# Patient Record
Sex: MALE | Race: WHITE | NOT HISPANIC OR LATINO | Employment: OTHER | URBAN - METROPOLITAN AREA
[De-identification: names, ages, dates, MRNs, and addresses within clinical notes are randomized per-mention and may not be internally consistent; named-entity substitution may affect disease eponyms.]

---

## 2017-01-10 ENCOUNTER — OFFICE VISIT (OUTPATIENT)
Dept: CARDIAC SURGERY | Facility: CLINIC | Age: 72
End: 2017-01-10

## 2017-01-10 ENCOUNTER — TRANSCRIBE ORDERS (OUTPATIENT)
Dept: PERIOP | Facility: HOSPITAL | Age: 72
End: 2017-01-10

## 2017-01-10 VITALS
RESPIRATION RATE: 20 BRPM | DIASTOLIC BLOOD PRESSURE: 79 MMHG | TEMPERATURE: 98.6 F | SYSTOLIC BLOOD PRESSURE: 169 MMHG | BODY MASS INDEX: 32.2 KG/M2 | OXYGEN SATURATION: 96 % | HEIGHT: 71 IN | HEART RATE: 72 BPM | WEIGHT: 230 LBS

## 2017-01-10 DIAGNOSIS — J44.9 CHRONIC OBSTRUCTIVE BRONCHITIS (HCC): ICD-10-CM

## 2017-01-10 DIAGNOSIS — I25.10 CORONARY ARTERY DISEASE INVOLVING NATIVE CORONARY ARTERY OF NATIVE HEART, ANGINA PRESENCE UNSPECIFIED: Primary | ICD-10-CM

## 2017-01-10 DIAGNOSIS — I20.8 ANGINA OF EFFORT (HCC): ICD-10-CM

## 2017-01-10 DIAGNOSIS — I25.10 CHRONIC CORONARY ARTERY DISEASE: Primary | ICD-10-CM

## 2017-01-10 DIAGNOSIS — I71.40 ABDOMINAL AORTIC ANEURYSM (AAA) WITHOUT RUPTURE (HCC): ICD-10-CM

## 2017-01-10 DIAGNOSIS — F17.200 CURRENT SMOKER: ICD-10-CM

## 2017-01-10 PROCEDURE — 99204 OFFICE O/P NEW MOD 45 MIN: CPT | Performed by: THORACIC SURGERY (CARDIOTHORACIC VASCULAR SURGERY)

## 2017-01-10 RX ORDER — CLOPIDOGREL BISULFATE 75 MG/1
75 TABLET ORAL
COMMUNITY
Start: 2013-04-10 | End: 2017-01-25 | Stop reason: HOSPADM

## 2017-01-10 RX ORDER — NITROGLYCERIN 0.4 MG/1
0.4 TABLET SUBLINGUAL
COMMUNITY
End: 2017-01-10 | Stop reason: ALTCHOICE

## 2017-01-10 RX ORDER — ASPIRIN 325 MG
325 TABLET ORAL DAILY
COMMUNITY
End: 2017-01-25 | Stop reason: HOSPADM

## 2017-01-10 RX ORDER — ESOMEPRAZOLE MAGNESIUM 40 MG/1
40 CAPSULE, DELAYED RELEASE ORAL
COMMUNITY
End: 2017-01-10 | Stop reason: ALTCHOICE

## 2017-01-10 NOTE — MR AVS SNAPSHOT
[facility] CARDIAC SURGERY  [phone]                    Conor Faustin   1/10/2017 11:00 AM   Office Visit    Dept Phone:  [phone]   Encounter #:  [id number]    Provider:  Jassi De Jesus MD   Department:  [facility] CARDIAC SURGERY                Your Full Care Plan              Today's Medication Changes          These changes are accurate as of: 1/10/17 12:35 PM.  If you have any questions, ask your nurse or doctor.               Medication(s)that have changed:     aspirin 325 MG tablet   Take 325 mg by mouth.   What changed:  Another medication with the same name was removed. Continue taking this medication, and follow the directions you see here.   Changed by:  Jassi De Jesus MD       clopidogrel 75 MG tablet   Commonly known as:  PLAVIX   Take 75 mg by mouth.   What changed:  Another medication with the same name was removed. Continue taking this medication, and follow the directions you see here.   Changed by:  Jassi De Jesus MD       nitroglycerin 0.4 MG SL tablet   Commonly known as:  NITROSTAT   Place 0.4 mg under the tongue Every 5 (Five) Minutes As Needed for chest pain. Take no more than 3 doses in 15 minutes.   What changed:  Another medication with the same name was removed. Continue taking this medication, and follow the directions you see here.         Stop taking medication(s)listed here:     esomeprazole 40 MG capsule   Commonly known as:  nexIUM   Stopped by:  Jassi De Jesus MD           DINESH MULTI MEN PO   Stopped by:  Jassi De Jesus MD           Milk Thistle 200 MG capsule   Stopped by:  Jassi De Jesus MD                      Your Updated Medication List          This list is accurate as of: 1/10/17 12:35 PM.  Always use your most recent med list.                aspirin 325 MG tablet       clopidogrel 75 MG tablet   Commonly known as:  PLAVIX       CoQ10 100 MG capsule       metoprolol tartrate 25 MG tablet   Commonly known as:   "LOPRESSOR       MULTIPLE VITAMINS ESSENTIAL PO       nitroglycerin 0.4 MG SL tablet   Commonly known as:  NITROSTAT               Instructions     None    Patient Instructions History      Upcoming Appointments     Visit Type Date Time Department    NEW PATIENT 1/10/2017 11:00 AM [facility] CARDIAC SURG [facility]      happnhart Signup     Our records indicate that you have [facility]. If you would like to sign up for [employer], please email [email or web address] or call [phone] to obtain an activation code.             Other Info from Your Visit           Allergies     Bisoprolol  Angioedema    Clarithromycin  Swelling      Vital Signs     Blood Pressure Pulse Temperature Respirations Height Weight    169/79 (BP Location: Right arm, Patient Position: Sitting, Cuff Size: Adult) 72 98.6 °F (37 °C) (Oral) 20 71\" (180.3 cm) 230 lb (104 kg)    Oxygen Saturation Body Mass Index Smoking Status             96% 32.08 kg/m2 Current Every Day Smoker         Problems and Diagnoses Noted     Anemia due to acute blood loss    Gallstones    Heart disease due to blocked artery    Chronic obstructive bronchitis    Smoker    High blood pressure        "

## 2017-01-10 NOTE — LETTER
January 14, 2017     Irena Hale MD  [address] [facility] [address]    Patient: Conor Faustin   YOB: 1945   Date of Visit: 1/10/2017       Dear Dr. Geoffrey MD:    Thank you for referring Conor Faustin to me for evaluation. Below are the relevant portions of my assessment and plan of care.    If you have questions, please do not hesitate to call me. I look forward to following Conor along with you.         Sincerely,        Jassi De Jesus MD        CC: MD Jassi Escobedo MD  1/14/2017  7:12 AM  Sign at close encounter  1/14/2017      Chief Complaint     Chest pain    History of Present Illness:       Dear Irena Seth, * and Colleagues,  It was nice to see Conor Faustin in consultation at your request. He is a 71 y.o. male with a history of HTN, AAA and COPD who has developed progressive angina of exertion, moderate, substernal, on minimal ambulation and improves with SL NTG. No diaphoresis or nausea. He denies syncope, presyncope, TIA, orthopnea, palpitations or LE edema. He had a positive stress test and a cath following showed a 100% LAD occlusion, D1 50%, OM 90 % and mid RCA 90%.  Normal LV gram.  A 2 D echo showed and EF or 64 % and valve disease. He still smokes 1 pack per day and has chronic bronchitis and suspect COPD. PFTs were done today and showed a FEV1 of 40 % to 47% with bronchodilators.    Patient Active Problem List   Diagnosis   • Aneurysm of aortic arch   • Essential hypertension   • Renal cyst   • Acute posthemorrhagic anemia   • Chronic coronary artery disease   • Abnormal result of cardiovascular function study   • Cholelithiasis   • Chronic obstructive bronchitis   • Current smoker   • AAA (abdominal aortic aneurysm)   • Angina of effort       Past Medical History   Diagnosis Date   • AAA (abdominal aortic aneurysm)    • Benign essential hypertension    • COPD (chronic obstructive pulmonary disease)    • Coronary  artery disease    • Renal cyst      RIGHT       Past Surgical History   Procedure Laterality Date   • Cardiac catheterization     • Peripheral arterial stent graft     • Coronary angioplasty     • Cardiac catheterization N/A 12/12/2016     Procedure: Left Heart Cath;  Surgeon: Irena Hale MD;  [facility] CATH INVASIVE LOC[facility];  Service:    • Cardiac catheterization         Allergies   Allergen Reactions   • Bisoprolol Angioedema   • Clarithromycin Swelling         Current Outpatient Prescriptions:   •  aspirin 325 MG tablet, Take 325 mg by mouth Daily. PREOP INSTR GIVEN, Disp: , Rfl:   •  clopidogrel (PLAVIX) 75 MG tablet, Take 75 mg by mouth. HOLD PER MD INSTR- STOPPED 1/10/2017, Disp: , Rfl:   •  Coenzyme Q10 (COQ10) 100 MG capsule, Take 400 mg by mouth Daily., Disp: , Rfl:   •  metoprolol tartrate (LOPRESSOR) 25 MG tablet, Take 25 mg by mouth Daily., Disp: , Rfl:   •  MULTIPLE VITAMINS ESSENTIAL PO, Take  by mouth., Disp: , Rfl:   •  nitroglycerin (NITROSTAT) 0.4 MG SL tablet, Place 0.4 mg under the tongue Every 5 (Five) Minutes As Needed for chest pain. Take no more than 3 doses in 15 minutes., Disp: , Rfl:   •  chlorhexidine (PERIDEX) 0.12 % solution, Apply 15 mL to the mouth or throat 2 (Two) Times a Day. PREOP INSTR GIVEN, Disp: , Rfl:   •  Chlorhexidine Gluconate Cloth 2 % pads, Apply  topically. PREOP INSTR GIVEN, Disp: , Rfl:   •  mupirocin (BACTROBAN) 2 % nasal ointment, into each nostril 2 (Two) Times a Day. PREOP INSTR GIVEN, Disp: , Rfl:   No current facility-administered medications for this visit.     Social History     Social History   • Marital status:      Spouse name: N/A   • Number of children: N/A   • Years of education: N/A     Occupational History   • Not on file.     Social History Main Topics   • Smoking status: Current Every Day Smoker     Packs/day: 1.00     Years: 64.00   • Smokeless tobacco: Never Used   • Alcohol use No   • Drug use: No   • Sexual activity:  Defer     Other Topics Concern   • Not on file     Social History Narrative       Family History   Problem Relation Age of Onset   • No Known Problems Mother    • No Known Problems Father    • No Known Problems Sister    • No Known Problems Brother    • No Known Problems Maternal Aunt    • No Known Problems Maternal Uncle    • No Known Problems Paternal Aunt    • No Known Problems Paternal Uncle    • No Known Problems Maternal Grandmother    • No Known Problems Maternal Grandfather    • Heart disease Paternal Grandmother    • Stroke Paternal Grandmother    • No Known Problems Paternal Grandfather      Review of Systems   Constitutional: Positive for fatigue.   Respiratory: Positive for chest tightness, shortness of breath and wheezing.    Cardiovascular: Positive for chest pain. Negative for palpitations and leg swelling.   Gastrointestinal: Negative for blood in stool.   Neurological: Positive for light-headedness. Negative for weakness and numbness.   All other systems reviewed and are negative.      Physical Exam:    Vital Signs:  Weight: 230 lb (104 kg)   Body mass index is 32.08 kg/(m^2).  Temp: 98.6 °F (37 °C)   Heart Rate: 72   BP: 169/79     Physical Exam   Constitutional: He is oriented to person, place, and time. He appears well-developed and well-nourished.   HENT:   Head: Normocephalic and atraumatic.   Nose: Nose normal.   Mouth/Throat: Oropharynx is clear and moist.   Eyes: Conjunctivae are normal. Pupils are equal, round, and reactive to light.   Neck: Normal range of motion. Neck supple. No JVD present. No thyromegaly present.   Cardiovascular: Normal rate, regular rhythm, normal heart sounds and intact distal pulses.  PMI is not displaced.  Exam reveals no gallop and no friction rub.    No murmur heard.  Pulses:       Radial pulses are 2+ on the right side, and 2+ on the left side.        Popliteal pulses are 2+ on the right side, and 2+ on the left side.        Posterior tibial pulses are 2+ on the  right side, and 2+ on the left side.   Pulmonary/Chest: Effort normal and breath sounds normal. He has no rales.   Abdominal: Soft. Bowel sounds are normal. He exhibits no distension and no mass. There is no tenderness.   Musculoskeletal: Normal range of motion. He exhibits no tenderness or deformity.   Lymphadenopathy:     He has no cervical adenopathy.   Neurological: He is alert and oriented to person, place, and time. He has normal reflexes.   Skin: Skin is warm and dry. No rash noted. No erythema.   Psychiatric: He has a normal mood and affect. His behavior is normal.   No groin or neck adenomegalies     Assessment:     Problem List Items Addressed This Visit        Cardiovascular and Mediastinum    Chronic coronary artery disease - Primary    Relevant Medications    clopidogrel (PLAVIX) 75 MG tablet    AAA (abdominal aortic aneurysm)    Angina of effort    Relevant Medications    clopidogrel (PLAVIX) 75 MG tablet       Respiratory    Chronic obstructive bronchitis       Other    Current smoker        As above, CAD, 3 vessel with angina of minimal effort, class III-IV.  Dyspnea associated with angina and probably COPD  Tobacco use  Small AAA, asymptomatic    Recommendation/Plan:     I recommend CABG x 3 with LIMA. I have discussed the risks (STS calculated), benefits and alternatives of surgery and he wishes to proceed. I have  him to stop smoking. He will probably need to be seen by a pulmonologist in the hospital as well.      Thank you for allowing me to participate in his care.    Regards,    Jassi De Jesus M.D.

## 2017-01-11 ENCOUNTER — TELEPHONE (OUTPATIENT)
Dept: CARDIAC SURGERY | Facility: CLINIC | Age: 72
End: 2017-01-11

## 2017-01-11 DIAGNOSIS — R09.89 BILATERAL CAROTID BRUITS: ICD-10-CM

## 2017-01-11 DIAGNOSIS — I25.10 ASHD (ARTERIOSCLEROTIC HEART DISEASE): Primary | ICD-10-CM

## 2017-01-12 ENCOUNTER — HOSPITAL ENCOUNTER (OUTPATIENT)
Dept: CARDIOLOGY | Facility: HOSPITAL | Age: 72
Discharge: HOME | End: 2017-01-12
Attending: THORACIC SURGERY (CARDIOTHORACIC VASCULAR SURGERY)

## 2017-01-12 ENCOUNTER — HOSPITAL ENCOUNTER (OUTPATIENT)
Dept: CARDIOLOGY | Facility: HOSPITAL | Age: 72
Discharge: HOME | End: 2017-01-12
Attending: THORACIC SURGERY (CARDIOTHORACIC VASCULAR SURGERY) | Admitting: THORACIC SURGERY (CARDIOTHORACIC VASCULAR SURGERY)

## 2017-01-12 ENCOUNTER — HOSPITAL ENCOUNTER (OUTPATIENT)
Dept: RESPIRATORY THERAPY | Facility: HOSPITAL | Age: 72
Discharge: HOME | End: 2017-01-12
Attending: THORACIC SURGERY (CARDIOTHORACIC VASCULAR SURGERY)

## 2017-01-12 ENCOUNTER — HOSPITAL ENCOUNTER (OUTPATIENT)
Dept: GENERAL RADIOLOGY | Facility: HOSPITAL | Age: 72
Discharge: HOME | End: 2017-01-12

## 2017-01-12 ENCOUNTER — DOCUMENTATION (OUTPATIENT)
Dept: CARDIAC SURGERY | Facility: CLINIC | Age: 72
End: 2017-01-12

## 2017-01-12 ENCOUNTER — ANESTHESIA EVENT (OUTPATIENT)
Dept: PERIOP | Facility: HOSPITAL | Age: 72
End: 2017-01-12

## 2017-01-12 ENCOUNTER — APPOINTMENT (OUTPATIENT)
Dept: PREADMISSION TESTING | Facility: HOSPITAL | Age: 72
End: 2017-01-12

## 2017-01-12 VITALS
OXYGEN SATURATION: 97 % | DIASTOLIC BLOOD PRESSURE: 78 MMHG | TEMPERATURE: 98 F | HEIGHT: 72 IN | BODY MASS INDEX: 31.04 KG/M2 | HEART RATE: 69 BPM | WEIGHT: 229.2 LBS | RESPIRATION RATE: 18 BRPM | SYSTOLIC BLOOD PRESSURE: 152 MMHG

## 2017-01-12 DIAGNOSIS — R09.89 BILATERAL CAROTID BRUITS: ICD-10-CM

## 2017-01-12 DIAGNOSIS — I25.10 ASHD (ARTERIOSCLEROTIC HEART DISEASE): ICD-10-CM

## 2017-01-12 DIAGNOSIS — Z01.818 PRE-OPERATIVE CLEARANCE: Primary | ICD-10-CM

## 2017-01-12 LAB
ABO GROUP BLD: NORMAL
ALBUMIN SERPL-MCNC: 4 G/DL (ref 3.5–5.2)
ALBUMIN/GLOB SERPL: 1.3 G/DL
ALP SERPL-CCNC: 92 U/L (ref 39–117)
ALT SERPL W P-5'-P-CCNC: 17 U/L (ref 1–41)
ANION GAP SERPL CALCULATED.3IONS-SCNC: 10.2 MMOL/L
APTT PPP: 27.7 SECONDS (ref 22.7–35.4)
ARTERIAL PATENCY WRIST A: POSITIVE
AST SERPL-CCNC: 18 U/L (ref 1–40)
ATMOSPHERIC PRESS: 751.9 MMHG
BASE EXCESS BLDA CALC-SCNC: 1.2 MMOL/L (ref 0–2)
BDY SITE: ABNORMAL
BH CV XLRA MEAS - DIST GSV CALF DIST LEFT: 0.3 CM
BH CV XLRA MEAS - DIST GSV CALF DIST RIGHT: 0.36 CM
BH CV XLRA MEAS - DIST GSV THIGH DIST LEFT: 0.37 CM
BH CV XLRA MEAS - DIST GSV THIGH DIST RIGHT: 0.4 CM
BH CV XLRA MEAS - GSV ANKLE DIST LEFT: 0.27 CM
BH CV XLRA MEAS - GSV ANKLE DIST RIGHT: 0.33 CM
BH CV XLRA MEAS - GSV KNEE DIST LEFT: 0.36 CM
BH CV XLRA MEAS - GSV KNEE DIST RIGHT: 0.45 CM
BH CV XLRA MEAS - GSV ORIGIN DIST LEFT: 0.55 CM
BH CV XLRA MEAS - GSV ORIGIN DIST RIGHT: 0.46 CM
BH CV XLRA MEAS - MID GSV CALF LEFT: 0.28 CM
BH CV XLRA MEAS - MID GSV CALF RIGHT: 0.3 CM
BH CV XLRA MEAS - MID GSV THIGH  LEFT: 0.32 CM
BH CV XLRA MEAS - MID GSV THIGH  RIGHT: 0.36 CM
BH CV XLRA MEAS - PROX GSV CALF DIST LEFT: 0.26 CM
BH CV XLRA MEAS - PROX GSV CALF DIST RIGHT: 0.36 CM
BH CV XLRA MEAS - PROX GSV THIGH  LEFT: 0.37 CM
BH CV XLRA MEAS - PROX GSV THIGH  RIGHT: 0.47 CM
BH CV XLRA MEAS LEFT CCA RATIO VEL: 72.1 CM/SEC
BH CV XLRA MEAS LEFT DIST CCA EDV: 16.4 CM/SEC
BH CV XLRA MEAS LEFT DIST CCA PSV: 72.1 CM/SEC
BH CV XLRA MEAS LEFT DIST ICA EDV: -18.2 CM/SEC
BH CV XLRA MEAS LEFT DIST ICA PSV: -69.2 CM/SEC
BH CV XLRA MEAS LEFT ICA RATIO VEL: -69.2 CM/SEC
BH CV XLRA MEAS LEFT ICA/CCA RATIO: -0.96
BH CV XLRA MEAS LEFT MID ICA EDV: -15.8 CM/SEC
BH CV XLRA MEAS LEFT MID ICA PSV: -61 CM/SEC
BH CV XLRA MEAS LEFT PROX CCA EDV: 14.1 CM/SEC
BH CV XLRA MEAS LEFT PROX CCA PSV: 82.1 CM/SEC
BH CV XLRA MEAS LEFT PROX ECA EDV: -7 CM/SEC
BH CV XLRA MEAS LEFT PROX ECA PSV: -64.8 CM/SEC
BH CV XLRA MEAS LEFT PROX ICA EDV: 13.5 CM/SEC
BH CV XLRA MEAS LEFT PROX ICA PSV: 65.7 CM/SEC
BH CV XLRA MEAS LEFT PROX SCLA PSV: 178 CM/SEC
BH CV XLRA MEAS LEFT VERTEBRAL A EDV: 9.4 CM/SEC
BH CV XLRA MEAS LEFT VERTEBRAL A PSV: 51.1 CM/SEC
BH CV XLRA MEAS RIGHT CCA RATIO VEL: 66.4 CM/SEC
BH CV XLRA MEAS RIGHT DIST CCA EDV: 11 CM/SEC
BH CV XLRA MEAS RIGHT DIST CCA PSV: 66.4 CM/SEC
BH CV XLRA MEAS RIGHT DIST ICA EDV: -24.4 CM/SEC
BH CV XLRA MEAS RIGHT DIST ICA PSV: -73.9 CM/SEC
BH CV XLRA MEAS RIGHT ICA RATIO VEL: -73.9 CM/SEC
BH CV XLRA MEAS RIGHT ICA/CCA RATIO: -1.1
BH CV XLRA MEAS RIGHT MID ICA EDV: -16.1 CM/SEC
BH CV XLRA MEAS RIGHT MID ICA PSV: -57.7 CM/SEC
BH CV XLRA MEAS RIGHT PROX CCA EDV: -11 CM/SEC
BH CV XLRA MEAS RIGHT PROX CCA PSV: -88.8 CM/SEC
BH CV XLRA MEAS RIGHT PROX ECA EDV: -8.7 CM/SEC
BH CV XLRA MEAS RIGHT PROX ECA PSV: -84.2 CM/SEC
BH CV XLRA MEAS RIGHT PROX ICA EDV: -12.2 CM/SEC
BH CV XLRA MEAS RIGHT PROX ICA PSV: -44 CM/SEC
BH CV XLRA MEAS RIGHT PROX SCLA PSV: 194 CM/SEC
BH CV XLRA MEAS RIGHT VERTEBRAL A EDV: 8.6 CM/SEC
BH CV XLRA MEAS RIGHT VERTEBRAL A PSV: 44.8 CM/SEC
BILIRUB SERPL-MCNC: 0.4 MG/DL (ref 0.1–1.2)
BILIRUB UR QL STRIP: NEGATIVE
BLD GP AB SCN SERPL QL: NEGATIVE
BUN BLD-MCNC: 16 MG/DL (ref 8–23)
BUN/CREAT SERPL: 14.4 (ref 7–25)
CALCIUM SPEC-SCNC: 9.3 MG/DL (ref 8.6–10.5)
CHLORIDE SERPL-SCNC: 99 MMOL/L (ref 98–107)
CLARITY UR: CLEAR
CLOSE TME COLL+ADP + EPINEP PNL BLD: 34 %
CO2 SERPL-SCNC: 30.8 MMOL/L (ref 22–29)
COLOR UR: YELLOW
CREAT BLD-MCNC: 1.11 MG/DL (ref 0.76–1.27)
DEPRECATED RDW RBC AUTO: 43.1 FL (ref 37–54)
ERYTHROCYTE [DISTWIDTH] IN BLOOD BY AUTOMATED COUNT: 13.2 % (ref 11.5–14.5)
GFR SERPL CREATININE-BSD FRML MDRD: 65 ML/MIN/1.73
GLOBULIN UR ELPH-MCNC: 3.1 GM/DL
GLUCOSE BLD-MCNC: 155 MG/DL (ref 65–99)
GLUCOSE UR STRIP-MCNC: NEGATIVE MG/DL
HBA1C MFR BLD: 6.8 % (ref 4.8–5.6)
HCO3 BLDA-SCNC: 26.2 MMOL/L (ref 22–28)
HCT VFR BLD AUTO: 48.6 % (ref 40.4–52.2)
HGB BLD-MCNC: 16.7 G/DL (ref 13.7–17.6)
HGB UR QL STRIP.AUTO: NEGATIVE
INR PPP: 0.91 (ref 0.9–1.1)
KETONES UR QL STRIP: NEGATIVE
LEFT ARM BP: NORMAL MMHG
LEUKOCYTE ESTERASE UR QL STRIP.AUTO: NEGATIVE
LYMPHOCYTES # BLD MANUAL: 1.97 10*3/MM3 (ref 0.9–4.8)
LYMPHOCYTES NFR BLD MANUAL: 18 % (ref 19.6–45.3)
LYMPHOCYTES NFR BLD MANUAL: 9 % (ref 5–12)
MCH RBC QN AUTO: 30.8 PG (ref 27–32.7)
MCHC RBC AUTO-ENTMCNC: 34.4 G/DL (ref 32.6–36.4)
MCV RBC AUTO: 89.5 FL (ref 79.8–96.2)
MODALITY: ABNORMAL
MONOCYTES # BLD AUTO: 0.99 10*3/MM3 (ref 0.2–1.2)
NEUTROPHILS # BLD AUTO: 8 10*3/MM3 (ref 1.9–8.1)
NEUTROPHILS NFR BLD MANUAL: 73 % (ref 42.7–76)
NITRITE UR QL STRIP: NEGATIVE
PCO2 BLDA: 42.1 MM HG (ref 35–45)
PH BLDA: 7.4 PH UNITS (ref 7.35–7.45)
PH UR STRIP.AUTO: 5.5 [PH] (ref 5–8)
PLAT MORPH BLD: NORMAL
PLATELET # BLD AUTO: 179 10*3/MM3 (ref 140–500)
PMV BLD AUTO: 10.6 FL (ref 6–12)
PO2 BLDA: 70.6 MM HG (ref 80–100)
POTASSIUM BLD-SCNC: 4.1 MMOL/L (ref 3.5–5.2)
PROT SERPL-MCNC: 7.1 G/DL (ref 6–8.5)
PROT UR QL STRIP: NEGATIVE
PROTHROMBIN TIME: 11.9 SECONDS (ref 11.7–14.2)
RBC # BLD AUTO: 5.43 10*6/MM3 (ref 4.6–6)
RBC MORPH BLD: NORMAL
RH BLD: POSITIVE
RIGHT ARM BP: NORMAL MMHG
SAO2 % BLDCOA: 93.9 % (ref 92–99)
SCAN SLIDE: NORMAL
SET MECH RESP RATE: 16
SODIUM BLD-SCNC: 140 MMOL/L (ref 136–145)
SP GR UR STRIP: 1.01 (ref 1–1.03)
UROBILINOGEN UR QL STRIP: NORMAL
WBC MORPH BLD: NORMAL
WBC NRBC COR # BLD: 10.96 10*3/MM3 (ref 4.5–10.7)

## 2017-01-12 PROCEDURE — A9270 NON-COVERED ITEM OR SERVICE: HCPCS | Performed by: THORACIC SURGERY (CARDIOTHORACIC VASCULAR SURGERY)

## 2017-01-12 PROCEDURE — 85025 COMPLETE CBC W/AUTO DIFF WBC: CPT | Performed by: THORACIC SURGERY (CARDIOTHORACIC VASCULAR SURGERY)

## 2017-01-12 PROCEDURE — 86920 COMPATIBILITY TEST SPIN: CPT

## 2017-01-12 PROCEDURE — 93970 EXTREMITY STUDY: CPT

## 2017-01-12 PROCEDURE — 85610 PROTHROMBIN TIME: CPT | Performed by: THORACIC SURGERY (CARDIOTHORACIC VASCULAR SURGERY)

## 2017-01-12 PROCEDURE — 86900 BLOOD TYPING SEROLOGIC ABO: CPT

## 2017-01-12 PROCEDURE — 93880 EXTRACRANIAL BILAT STUDY: CPT

## 2017-01-12 PROCEDURE — 80053 COMPREHEN METABOLIC PANEL: CPT | Performed by: THORACIC SURGERY (CARDIOTHORACIC VASCULAR SURGERY)

## 2017-01-12 PROCEDURE — 93005 ELECTROCARDIOGRAM TRACING: CPT

## 2017-01-12 PROCEDURE — 94799 UNLISTED PULMONARY SVC/PX: CPT

## 2017-01-12 PROCEDURE — 36415 COLL VENOUS BLD VENIPUNCTURE: CPT

## 2017-01-12 PROCEDURE — 86850 RBC ANTIBODY SCREEN: CPT

## 2017-01-12 PROCEDURE — 85576 BLOOD PLATELET AGGREGATION: CPT | Performed by: THORACIC SURGERY (CARDIOTHORACIC VASCULAR SURGERY)

## 2017-01-12 PROCEDURE — 63710000001 ALBUTEROL PER 1 MG: Performed by: THORACIC SURGERY (CARDIOTHORACIC VASCULAR SURGERY)

## 2017-01-12 PROCEDURE — 94640 AIRWAY INHALATION TREATMENT: CPT

## 2017-01-12 PROCEDURE — 71020 HC CHEST PA AND LATERAL: CPT

## 2017-01-12 PROCEDURE — 36600 WITHDRAWAL OF ARTERIAL BLOOD: CPT | Performed by: INTERNAL MEDICINE

## 2017-01-12 PROCEDURE — 93010 ELECTROCARDIOGRAM REPORT: CPT | Performed by: INTERNAL MEDICINE

## 2017-01-12 PROCEDURE — 82803 BLOOD GASES ANY COMBINATION: CPT | Performed by: INTERNAL MEDICINE

## 2017-01-12 PROCEDURE — 83036 HEMOGLOBIN GLYCOSYLATED A1C: CPT | Performed by: THORACIC SURGERY (CARDIOTHORACIC VASCULAR SURGERY)

## 2017-01-12 PROCEDURE — 81003 URINALYSIS AUTO W/O SCOPE: CPT | Performed by: THORACIC SURGERY (CARDIOTHORACIC VASCULAR SURGERY)

## 2017-01-12 PROCEDURE — 86901 BLOOD TYPING SEROLOGIC RH(D): CPT

## 2017-01-12 PROCEDURE — 85730 THROMBOPLASTIN TIME PARTIAL: CPT | Performed by: THORACIC SURGERY (CARDIOTHORACIC VASCULAR SURGERY)

## 2017-01-12 PROCEDURE — 94060 EVALUATION OF WHEEZING: CPT

## 2017-01-12 PROCEDURE — 85007 BL SMEAR W/DIFF WBC COUNT: CPT | Performed by: THORACIC SURGERY (CARDIOTHORACIC VASCULAR SURGERY)

## 2017-01-12 RX ORDER — CHLORHEXIDINE GLUCONATE 0.12 MG/ML
15 RINSE ORAL EVERY 12 HOURS SCHEDULED
Status: DISPENSED | OUTPATIENT
Start: 2017-01-12 | End: 2017-01-13

## 2017-01-12 RX ORDER — CHLORHEXIDINE GLUCONATE 0.12 MG/ML
15 RINSE ORAL 2 TIMES DAILY
COMMUNITY
End: 2017-01-25 | Stop reason: HOSPADM

## 2017-01-12 RX ORDER — ALBUTEROL SULFATE 2.5 MG/3ML
2.5 SOLUTION RESPIRATORY (INHALATION) ONCE AS NEEDED
Status: COMPLETED | OUTPATIENT
Start: 2017-01-12 | End: 2017-01-12

## 2017-01-12 RX ORDER — CHLORHEXIDINE GLUCONATE 500 MG/1
CLOTH TOPICAL
COMMUNITY
End: 2017-01-25 | Stop reason: HOSPADM

## 2017-01-12 RX ADMIN — ALBUTEROL SULFATE 2.5 MG: 2.5 SOLUTION RESPIRATORY (INHALATION) at 10:14

## 2017-01-12 NOTE — ANESTHESIA PREPROCEDURE EVALUATION
Anesthesia Evaluation     Patient summary reviewed    Airway   Mallampati: I  Neck ROM: full  no difficulty expected  Dental    (+) edentulous    Pulmonary    (+) COPD,     ROS comment: smoker  Cardiovascular   (+) hypertension, CAD,     Rhythm: regular    Neuro/Psych  GI/Hepatic/Renal/Endo      Musculoskeletal     Abdominal    Substance History      OB/GYN          Other                           Anesthesia Plan    ASA 4     general     intravenous induction   Anesthetic plan and risks discussed with patient.  Use of blood products discussed with patient .

## 2017-01-12 NOTE — MR AVS SNAPSHOT
Conor Faustin   1/12/2017 8:15 AM   Appointment    Provider:  MELISSA GUIDRY PAT 1   Department:  [facility] PREADMISSION T   Dept Phone:  [phone]                Your Full Care Plan           To Do List     1/12/2017 12:45 PM     Appointment with CHAO FIGUEROA NIVAS VASC CART 4 at [facility] NON-INV VASC ([phone])   [address] [facility] [address]       1/12/2017 1:30 PM     Appointment with CHAO FIGUEROA NIVAS VASC CART 4 at [facility] NON-INV VASC ([phone])   DUPLEX ABDOMINAL VASCULAR COMPLETE CAR:  Take BP meds with a sip of water.  DUPLEX PORTAL HEPATIC COMPLETE CAR:  NPO 8 hours prior.   [address] [facility] [address]       Around 1/17/2017     Lab:  Blood Gas, Arterial        Around 1/17/2017     Lab:  Scan Slide        1/17/2017 11:55 AM     Appointment with MELISSA GUIDRY OR ECHO CART OR16 at [facility] MAIN OR ([phone])   Do not eat or drink after midnight. You may be sedated for your test and will need someone to drive you home. Bring your insurance cards with you. Bring all medications in their original bottles.   [address] [facility] [address]            Your Updated Medication List          This list is accurate as of: 1/12/17  9:00 AM.  Always use your most recent med list.                aspirin 325 MG tablet       Chlorhexidine Gluconate Cloth 2 % pads       clopidogrel 75 MG tablet   Commonly known as:  PLAVIX       CoQ10 100 MG capsule       metoprolol tartrate 25 MG tablet   Commonly known as:  LOPRESSOR       MULTIPLE VITAMINS ESSENTIAL PO       mupirocin 2 % nasal ointment   Commonly known as:  BACTROBAN       nitroglycerin 0.4 MG SL tablet   Commonly known as:  NITROSTAT       PERIDEX 0.12 % solution   Generic drug:  chlorhexidine               MyChart Signup     Our records indicate that you have declined [facility] MyChart signup. If you would like to sign up for MyChart, please  "email [email or web address] or call [phone] to obtain an activation code.             Other Info from Your Visit           Allergies     Bisoprolol Angioedema    Clarithromycin Swelling      Vital Signs     Blood Pressure Pulse Temperature Respirations Height Weight    152/78 (BP Location: Left arm, Patient Position: Sitting) 69 98 °F (36.7 °C) (Oral) 18 71.5\" (181.6 cm) 229 lb 3.2 oz (104 kg)    Oxygen Saturation Body Mass Index Smoking Status             97% 31.52 kg/m2 Current Every Day Smoker           Discharge Instructions       Take the following medications the morning of surgery with a small sip of water.  NONE    ARRIVE AT 5AM.      General Instructions:  • Do not eat or drink after midnight: includes water, mints, or gum. You may brush your teeth.  • Do not smoke, chew tobacco, or drink alcohol.  • Bring medications in original bottles, any inhalers and if applicable your C-PAP/ BI-PAP machine.  • Bring any papers given to you in the doctor’s office.  • Wear clean comfortable clothes and socks.  • Do not wear contact lenses or make-up.  Bring a case for your glasses if applicable.   • Bring crutches or walker if applicable.  • Leave all other valuables and jewelry at home.    If you were given a blood bank ID arm band remember to bring it with you the day of surgery.    Preventing a Surgical Site Infection:  Shower on the morning of surgery using a fresh bar of anti-bacterial soap (such as Dial) and clean washcloth.  Dry with a clean towel and dress in clean clothing.  For 2 to 3 days before surgery, avoid shaving with a razor near where you will have surgery because the razor can irritate skin and make it easier to develop an infection  Ask your surgeon if you will be receiving antibiotics prior to surgery  Make sure you, your family, and all healthcare providers clean their hands with soap and water or an alcohol based hand  before caring for you or your wound  If at all possible, " quit smoking as many days before surgery as you can.    Day of surgery:  Upon arrival, a Pre-op nurse and Anesthesiologist will review your health history, obtain vital signs, and answer questions you may have.  The only belongings needed at this time will be your home medications and if applicable your C-PAP/BI-PAP machine.  If you are staying overnight your family can leave the rest of your belongings in the car and bring them to your room later.  A Pre-op nurse will start an IV and you may receive medication in preparation for surgery, including something to help you relax.  Your family will be able to see you in the Pre-op area.  While you are in surgery your family should notify the waiting room  if they leave the waiting room area and provide a contact phone number.    Please be aware that surgery does come with discomfort.  We want to make every effort to control your discomfort so please discuss any uncontrolled symptoms with your nurse.   Your doctor will most likely have prescribed pain medications.      If you are going home after surgery you will receive individualized written care instructions before being discharged.  A responsible adult must drive you to and from the hospital on the day of your surgery and stay with you for 24 hours.    If you are staying overnight following surgery, you will be transported to your hospital room following the recovery period.  [facility] has all private rooms.    If you have any questions please call Pre-Admission Testing at [phone].  Deductibles and co-payments are collected on the day of service. Please be prepared to pay the required co-pay, deductible or deposit on the day of service as defined by your plan.    2% CHLORAHEXIDINE GLUCONATE* CLOTH  Preparing or “prepping” skin before surgery can reduce the risk of infection at the surgical site. To make the process easier, [facility] has chosen disposable cloths moistened  with a rinse-free, 2% Chlorhexidine Gluconate (CHG) antiseptic solution. The steps below outline the prepping process and should be carefully followed.        Use the prep cloth on the area that is circled in the diagram             Directions Night before Surgery  1) Shower using a fresh bar of anti-bacterial soap (such as Dial) and clean washcloth.  Use a clean towel to completely dry your skin.  2) Do not use any lotions, oils or creams on your skin.  3) Open the package and remove 1 cloth, wipe your skin for 30 seconds in a circular motion.  Allow to dry for 3 minutes.  4) Repeat #3 with second cloth.  5) Do not touch your eyes, ears, or mouth with the prep cloth.  6) Allow the wet prep solution to air dry.  7) Discard the prep cloth and wash your hands with soap and water.   8) Dress in clean bed clothes and sleep on fresh clean bed sheets.   9) You may experience some temporary itching after the prep.    Directions Day of Surgery  1) Repeat steps 1,2,3,4,5,6,7, and 9.   2) Dress in clean clothes before coming to the hospital.    BACTROBAN NASAL OINTMENT  There are many germs normally in your nose. Bactroban is an ointment that will help reduce these germs. Please follow these instructions for Bactroban use:        ____The day before surgery in the evening              Date________    ____The day of surgery in the morning    Date________    **Squirt ½ package of Bactroban Ointment onto a cotton applicator and apply to inside of 1st nostril.  Squirt the remaining Bactroban and apply to the inside of the other nostril.    PERIDEX- ORAL:  Use only if your surgeon has ordered  Use the night before and morning of surgery - Swish, gargle, and spit - do not swallow.       SYMPTOMS OF A STROKE    Call 911 or have someone take you to the Emergency Department if you have any of the following:    · Sudden numbness or weakness of your face, arm or leg especially on one side of the body  · Sudden confusion, diffiiculty  speaking or trouble understanding   · Changes in your vision or loss of sight in one eye  · Sudden severe headache with no known cause  · sudden dizziness, trouble walking, loss of balance or coordination    It is important to seek emergency care right away if you have further stroke symptoms. If you get emergency help quickly, the powerful clot-dissolving medicines can reduce the disabilities caused by a stroke.     For more information:    American Stroke Association  5-635-1-STROKE  www.strokeassociation.org           IF YOU SMOKE OR USE TOBACCO PLEASE READ THE FOLLOWING:    Why is smoking bad for me?  Smoking increases the risk of heart disease, lung disease, vascular disease, stroke, and cancer.     If you smoke, STOP!    If you would like more information on quitting smoking, please visit the [employer] website: www.LivingSocial/Revenewate/healthier-together/smoke   This link will provide additional resources including the QUIT line and the Beat the Pack support groups.     For more information:    American Cancer Society  (741) 107-9204    American Heart Association  1-232.904.1912

## 2017-01-12 NOTE — DISCHARGE INSTRUCTIONS
Take the following medications the morning of surgery with a small sip of water.  NONE    ARRIVE AT 5AM.      General Instructions:  • Do not eat or drink after midnight: includes water, mints, or gum. You may brush your teeth.  • Do not smoke, chew tobacco, or drink alcohol.  • Bring medications in original bottles, any inhalers and if applicable your C-PAP/ BI-PAP machine.  • Bring any papers given to you in the doctor’s office.  • Wear clean comfortable clothes and socks.  • Do not wear contact lenses or make-up.  Bring a case for your glasses if applicable.   • Bring crutches or walker if applicable.  • Leave all other valuables and jewelry at home.    If you were given a blood bank ID arm band remember to bring it with you the day of surgery.    Preventing a Surgical Site Infection:  Shower on the morning of surgery using a fresh bar of anti-bacterial soap (such as Dial) and clean washcloth.  Dry with a clean towel and dress in clean clothing.  For 2 to 3 days before surgery, avoid shaving with a razor near where you will have surgery because the razor can irritate skin and make it easier to develop an infection  Ask your surgeon if you will be receiving antibiotics prior to surgery  Make sure you, your family, and all healthcare providers clean their hands with soap and water or an alcohol based hand  before caring for you or your wound  If at all possible, quit smoking as many days before surgery as you can.    Day of surgery:  Upon arrival, a Pre-op nurse and Anesthesiologist will review your health history, obtain vital signs, and answer questions you may have.  The only belongings needed at this time will be your home medications and if applicable your C-PAP/BI-PAP machine.  If you are staying overnight your family can leave the rest of your belongings in the car and bring them to your room later.  A Pre-op nurse will start an IV and you may receive medication in preparation for surgery, including  something to help you relax.  Your family will be able to see you in the Pre-op area.  While you are in surgery your family should notify the waiting room  if they leave the waiting room area and provide a contact phone number.    Please be aware that surgery does come with discomfort.  We want to make every effort to control your discomfort so please discuss any uncontrolled symptoms with your nurse.   Your doctor will most likely have prescribed pain medications.      If you are going home after surgery you will receive individualized written care instructions before being discharged.  A responsible adult must drive you to and from the hospital on the day of your surgery and stay with you for 24 hours.    If you are staying overnight following surgery, you will be transported to your hospital room following the recovery period.  [facility] has all private rooms.    If you have any questions please call Pre-Admission Testing at [phone].  Deductibles and co-payments are collected on the day of service. Please be prepared to pay the required co-pay, deductible or deposit on the day of service as defined by your plan.    2% CHLORAHEXIDINE GLUCONATE* CLOTH  Preparing or “prepping” skin before surgery can reduce the risk of infection at the surgical site. To make the process easier, [facility] has chosen disposable cloths moistened with a rinse-free, 2% Chlorhexidine Gluconate (CHG) antiseptic solution. The steps below outline the prepping process and should be carefully followed.        Use the prep cloth on the area that is circled in the diagram             Directions Night before Surgery  1) Shower using a fresh bar of anti-bacterial soap (such as Dial) and clean washcloth.  Use a clean towel to completely dry your skin.  2) Do not use any lotions, oils or creams on your skin.  3) Open the package and remove 1 cloth, wipe your skin for 30 seconds in a circular motion.   Allow to dry for 3 minutes.  4) Repeat #3 with second cloth.  5) Do not touch your eyes, ears, or mouth with the prep cloth.  6) Allow the wet prep solution to air dry.  7) Discard the prep cloth and wash your hands with soap and water.   8) Dress in clean bed clothes and sleep on fresh clean bed sheets.   9) You may experience some temporary itching after the prep.    Directions Day of Surgery  1) Repeat steps 1,2,3,4,5,6,7, and 9.   2) Dress in clean clothes before coming to the hospital.    BACTROBAN NASAL OINTMENT  There are many germs normally in your nose. Bactroban is an ointment that will help reduce these germs. Please follow these instructions for Bactroban use:        ____The day before surgery in the evening              Date________    ____The day of surgery in the morning    Date________    **Squirt ½ package of Bactroban Ointment onto a cotton applicator and apply to inside of 1st nostril.  Squirt the remaining Bactroban and apply to the inside of the other nostril.    PERIDEX- ORAL:  Use only if your surgeon has ordered  Use the night before and morning of surgery - Swish, gargle, and spit - do not swallow.

## 2017-01-12 NOTE — PROGRESS NOTES
Mr. Faustin is scheduled for CABG with Dr. De Jesus on 1/17/17. He was just seen in the office by Dr. De Jesus on 1/10/17. Seen today in Pre-Admission Testing. Surgery discussed with patient and his wife and all questions answered. States his last dose of Plavix was on 1/10/17.

## 2017-01-14 PROBLEM — I20.8 ANGINA OF EFFORT (HCC): Status: ACTIVE | Noted: 2017-01-14

## 2017-01-14 PROBLEM — I20.89 ANGINA OF EFFORT: Status: ACTIVE | Noted: 2017-01-14

## 2017-01-14 NOTE — PROGRESS NOTES
1/14/2017      Chief Complaint     Chest pain    History of Present Illness:       Dear Irena Seth, * and Colleagues,  It was nice to see Conor Faustin in consultation at your request. He is a 71 y.o. male with a history of HTN, AAA and COPD who has developed progressive angina of exertion, moderate, substernal, on minimal ambulation and improves with SL NTG. No diaphoresis or nausea. He denies syncope, presyncope, TIA, orthopnea, palpitations or LE edema. He had a positive stress test and a cath following showed a 100% LAD occlusion, D1 50%, OM 90 % and mid RCA 90%.  Normal LV gram.  A 2 D echo showed and EF or 64 % and valve disease. He still smokes 1 pack per day and has chronic bronchitis and suspect COPD. PFTs were done today and showed a FEV1 of 40 % to 47% with bronchodilators.    Patient Active Problem List   Diagnosis   • Aneurysm of aortic arch   • Essential hypertension   • Renal cyst   • Acute posthemorrhagic anemia   • Chronic coronary artery disease   • Abnormal result of cardiovascular function study   • Cholelithiasis   • Chronic obstructive bronchitis   • Current smoker   • AAA (abdominal aortic aneurysm)   • Angina of effort       Past Medical History   Diagnosis Date   • AAA (abdominal aortic aneurysm)    • Benign essential hypertension    • COPD (chronic obstructive pulmonary disease)    • Coronary artery disease    • Renal cyst      RIGHT       Past Surgical History   Procedure Laterality Date   • Cardiac catheterization     • Peripheral arterial stent graft     • Coronary angioplasty     • Cardiac catheterization N/A 12/12/2016     Procedure: Left Heart Cath;  Surgeon: Irena Hale MD;  Location: [facility] CATH INVASIVE LOCATION;  Service:    • Cardiac catheterization         Allergies   Allergen Reactions   • Bisoprolol Angioedema   • Clarithromycin Swelling         Current Outpatient Prescriptions:   •  aspirin 325 MG tablet, Take 325 mg by mouth Daily. PREOP INSTR  GIVEN, Disp: , Rfl:   •  clopidogrel (PLAVIX) 75 MG tablet, Take 75 mg by mouth. HOLD PER MD INSTR- STOPPED 1/10/2017, Disp: , Rfl:   •  Coenzyme Q10 (COQ10) 100 MG capsule, Take 400 mg by mouth Daily., Disp: , Rfl:   •  metoprolol tartrate (LOPRESSOR) 25 MG tablet, Take 25 mg by mouth Daily., Disp: , Rfl:   •  MULTIPLE VITAMINS ESSENTIAL PO, Take  by mouth., Disp: , Rfl:   •  nitroglycerin (NITROSTAT) 0.4 MG SL tablet, Place 0.4 mg under the tongue Every 5 (Five) Minutes As Needed for chest pain. Take no more than 3 doses in 15 minutes., Disp: , Rfl:   •  chlorhexidine (PERIDEX) 0.12 % solution, Apply 15 mL to the mouth or throat 2 (Two) Times a Day. PREOP INSTR GIVEN, Disp: , Rfl:   •  Chlorhexidine Gluconate Cloth 2 % pads, Apply  topically. PREOP INSTR GIVEN, Disp: , Rfl:   •  mupirocin (BACTROBAN) 2 % nasal ointment, into each nostril 2 (Two) Times a Day. PREOP INSTR GIVEN, Disp: , Rfl:   No current facility-administered medications for this visit.     Social History     Social History   • Marital status:      Spouse name: N/A   • Number of children: N/A   • Years of education: N/A     Occupational History   • Not on file.     Social History Main Topics   • Smoking status: Current Every Day Smoker     Packs/day: 1.00     Years: 64.00   • Smokeless tobacco: Never Used   • Alcohol use No   • Drug use: No   • Sexual activity: Defer     Other Topics Concern   • Not on file     Social History Narrative       Family History   Problem Relation Age of Onset   • No Known Problems Mother    • No Known Problems Father    • No Known Problems Sister    • No Known Problems Brother    • No Known Problems Maternal Aunt    • No Known Problems Maternal Uncle    • No Known Problems Paternal Aunt    • No Known Problems Paternal Uncle    • No Known Problems Maternal Grandmother    • No Known Problems Maternal Grandfather    • Heart disease Paternal Grandmother    • Stroke Paternal Grandmother    • No Known Problems Paternal  Grandfather      Review of Systems   Constitutional: Positive for fatigue.   Respiratory: Positive for chest tightness, shortness of breath and wheezing.    Cardiovascular: Positive for chest pain. Negative for palpitations and leg swelling.   Gastrointestinal: Negative for blood in stool.   Neurological: Positive for light-headedness. Negative for weakness and numbness.   All other systems reviewed and are negative.      Physical Exam:    Vital Signs:  Weight: 230 lb (104 kg)   Body mass index is 32.08 kg/(m^2).  Temp: 98.6 °F (37 °C)   Heart Rate: 72   BP: 169/79     Physical Exam   Constitutional: He is oriented to person, place, and time. He appears well-developed and well-nourished.   HENT:   Head: Normocephalic and atraumatic.   Nose: Nose normal.   Mouth/Throat: Oropharynx is clear and moist.   Eyes: Conjunctivae are normal. Pupils are equal, round, and reactive to light.   Neck: Normal range of motion. Neck supple. No JVD present. No thyromegaly present.   Cardiovascular: Normal rate, regular rhythm, normal heart sounds and intact distal pulses.  PMI is not displaced.  Exam reveals no gallop and no friction rub.    No murmur heard.  Pulses:       Radial pulses are 2+ on the right side, and 2+ on the left side.        Popliteal pulses are 2+ on the right side, and 2+ on the left side.        Posterior tibial pulses are 2+ on the right side, and 2+ on the left side.   Pulmonary/Chest: Effort normal and breath sounds normal. He has no rales.   Abdominal: Soft. Bowel sounds are normal. He exhibits no distension and no mass. There is no tenderness.   Musculoskeletal: Normal range of motion. He exhibits no tenderness or deformity.   Lymphadenopathy:     He has no cervical adenopathy.   Neurological: He is alert and oriented to person, place, and time. He has normal reflexes.   Skin: Skin is warm and dry. No rash noted. No erythema.   Psychiatric: He has a normal mood and affect. His behavior is normal.   No groin  or neck adenomegalies     Assessment:     Problem List Items Addressed This Visit        Cardiovascular and Mediastinum    Chronic coronary artery disease - Primary    Relevant Medications    clopidogrel (PLAVIX) 75 MG tablet    AAA (abdominal aortic aneurysm)    Angina of effort    Relevant Medications    clopidogrel (PLAVIX) 75 MG tablet       Respiratory    Chronic obstructive bronchitis       Other    Current smoker        As above, CAD, 3 vessel with angina of minimal effort, class III-IV.  Dyspnea associated with angina and probably COPD  Tobacco use  Small AAA, asymptomatic    Recommendation/Plan:     I recommend CABG x 3 with LIMA. I have discussed the risks (STS calculated), benefits and alternatives of surgery and he wishes to proceed. I have  him to stop smoking. He will probably need to be seen by a pulmonologist in the hospital as well.      Thank you for allowing me to participate in his care.    Regards,    Jassi De Jeuss M.D.

## 2017-01-17 ENCOUNTER — HOSPITAL ENCOUNTER (INPATIENT)
Facility: HOSPITAL | Age: 72
LOS: 8 days | Discharge: HOME HEALTH | End: 2017-01-25
Attending: THORACIC SURGERY (CARDIOTHORACIC VASCULAR SURGERY) | Admitting: THORACIC SURGERY (CARDIOTHORACIC VASCULAR SURGERY)

## 2017-01-17 ENCOUNTER — APPOINTMENT (OUTPATIENT)
Dept: GENERAL RADIOLOGY | Facility: HOSPITAL | Age: 72
End: 2017-01-17

## 2017-01-17 ENCOUNTER — APPOINTMENT (OUTPATIENT)
Dept: PERIOP | Facility: HOSPITAL | Age: 72
End: 2017-01-17
Attending: THORACIC SURGERY (CARDIOTHORACIC VASCULAR SURGERY)

## 2017-01-17 ENCOUNTER — ANESTHESIA (OUTPATIENT)
Dept: PERIOP | Facility: HOSPITAL | Age: 72
End: 2017-01-17

## 2017-01-17 DIAGNOSIS — R53.1 GENERALIZED WEAKNESS: Primary | ICD-10-CM

## 2017-01-17 DIAGNOSIS — I25.10 CORONARY ARTERY DISEASE INVOLVING NATIVE CORONARY ARTERY OF NATIVE HEART, ANGINA PRESENCE UNSPECIFIED: ICD-10-CM

## 2017-01-17 DIAGNOSIS — I25.118 CORONARY ARTERY DISEASE OF NATIVE ARTERY OF NATIVE HEART WITH STABLE ANGINA PECTORIS (HCC): ICD-10-CM

## 2017-01-17 LAB
ACT BLD: 126 SECONDS (ref 82–152)
ACT BLD: 137 SECONDS (ref 82–152)
ACT BLD: 374 SECONDS (ref 82–152)
ACT BLD: 420 SECONDS (ref 82–152)
ACT BLD: 456 SECONDS (ref 82–152)
ACT BLD: 492 SECONDS (ref 82–152)
ACT BLD: 518 SECONDS (ref 82–152)
ALBUMIN SERPL-MCNC: 3.7 G/DL (ref 3.5–5.2)
ALBUMIN SERPL-MCNC: 4.4 G/DL (ref 3.5–5.2)
ANION GAP SERPL CALCULATED.3IONS-SCNC: 12.5 MMOL/L
ANION GAP SERPL CALCULATED.3IONS-SCNC: 13.8 MMOL/L
APTT PPP: 32.9 SECONDS (ref 22.7–35.4)
APTT PPP: 39.5 SECONDS (ref 22.7–35.4)
ARTERIAL PATENCY WRIST A: ABNORMAL
ATMOSPHERIC PRESS: 749.3 MMHG
ATMOSPHERIC PRESS: 750.3 MMHG
ATMOSPHERIC PRESS: 750.8 MMHG
ATMOSPHERIC PRESS: 752.8 MMHG
BASE EXCESS BLDA CALC-SCNC: -0.8 MMOL/L (ref 0–2)
BASE EXCESS BLDA CALC-SCNC: -0.8 MMOL/L (ref 0–2)
BASE EXCESS BLDA CALC-SCNC: -1 MMOL/L (ref -5–5)
BASE EXCESS BLDA CALC-SCNC: -3 MMOL/L (ref -5–5)
BASE EXCESS BLDA CALC-SCNC: -5 MMOL/L (ref -5–5)
BASE EXCESS BLDA CALC-SCNC: 0 MMOL/L (ref -5–5)
BASE EXCESS BLDA CALC-SCNC: 0 MMOL/L (ref -5–5)
BASE EXCESS BLDA CALC-SCNC: 0.2 MMOL/L (ref 0–2)
BASE EXCESS BLDA CALC-SCNC: 0.8 MMOL/L (ref 0–2)
BASE EXCESS BLDA CALC-SCNC: 2 MMOL/L (ref -5–5)
BASE EXCESS BLDA CALC-SCNC: 3 MMOL/L (ref -5–5)
BASOPHILS # BLD AUTO: 0.01 10*3/MM3 (ref 0–0.2)
BASOPHILS NFR BLD AUTO: 0.1 % (ref 0–1.5)
BDY SITE: ABNORMAL
BUN BLD-MCNC: 14 MG/DL (ref 8–23)
BUN BLD-MCNC: 14 MG/DL (ref 8–23)
BUN/CREAT SERPL: 9.5 (ref 7–25)
BUN/CREAT SERPL: 9.7 (ref 7–25)
CA-I BLD-MCNC: 4.8 MG/DL (ref 4.6–5.4)
CA-I SERPL ISE-MCNC: 1.2 MMOL/L (ref 1.1–1.35)
CALCIUM SPEC-SCNC: 8 MG/DL (ref 8.6–10.5)
CALCIUM SPEC-SCNC: 8.4 MG/DL (ref 8.6–10.5)
CHLORIDE SERPL-SCNC: 106 MMOL/L (ref 98–107)
CHLORIDE SERPL-SCNC: 107 MMOL/L (ref 98–107)
CO2 BLDA-SCNC: 25 MMOL/L (ref 24–29)
CO2 BLDA-SCNC: 26 MMOL/L (ref 24–29)
CO2 BLDA-SCNC: 26 MMOL/L (ref 24–29)
CO2 BLDA-SCNC: 27 MMOL/L (ref 24–29)
CO2 BLDA-SCNC: 28 MMOL/L (ref 24–29)
CO2 BLDA-SCNC: 29 MMOL/L (ref 24–29)
CO2 BLDA-SCNC: 30 MMOL/L (ref 24–29)
CO2 SERPL-SCNC: 24.2 MMOL/L (ref 22–29)
CO2 SERPL-SCNC: 27.5 MMOL/L (ref 22–29)
CREAT BLD-MCNC: 1.44 MG/DL (ref 0.76–1.27)
CREAT BLD-MCNC: 1.47 MG/DL (ref 0.76–1.27)
DEPRECATED RDW RBC AUTO: 44.4 FL (ref 37–54)
DEPRECATED RDW RBC AUTO: 44.7 FL (ref 37–54)
DEPRECATED RDW RBC AUTO: 45 FL (ref 37–54)
DEPRECATED RDW RBC AUTO: 46.1 FL (ref 37–54)
EOSINOPHIL # BLD AUTO: 0.07 10*3/MM3 (ref 0–0.7)
EOSINOPHIL NFR BLD AUTO: 0.5 % (ref 0.3–6.2)
ERYTHROCYTE [DISTWIDTH] IN BLOOD BY AUTOMATED COUNT: 13.3 % (ref 11.5–14.5)
ERYTHROCYTE [DISTWIDTH] IN BLOOD BY AUTOMATED COUNT: 13.4 % (ref 11.5–14.5)
ERYTHROCYTE [DISTWIDTH] IN BLOOD BY AUTOMATED COUNT: 13.5 % (ref 11.5–14.5)
ERYTHROCYTE [DISTWIDTH] IN BLOOD BY AUTOMATED COUNT: 13.6 % (ref 11.5–14.5)
FIBRINOGEN PPP-MCNC: 189 MG/DL (ref 219–464)
FIBRINOGEN PPP-MCNC: 220 MG/DL (ref 219–464)
GFR SERPL CREATININE-BSD FRML MDRD: 47 ML/MIN/1.73
GFR SERPL CREATININE-BSD FRML MDRD: 48 ML/MIN/1.73
GLUCOSE BLD-MCNC: 125 MG/DL (ref 65–99)
GLUCOSE BLD-MCNC: 168 MG/DL (ref 65–99)
GLUCOSE BLDC GLUCOMTR-MCNC: 131 MG/DL (ref 70–130)
GLUCOSE BLDC GLUCOMTR-MCNC: 132 MG/DL (ref 70–130)
GLUCOSE BLDC GLUCOMTR-MCNC: 141 MG/DL (ref 70–130)
GLUCOSE BLDC GLUCOMTR-MCNC: 144 MG/DL (ref 70–130)
GLUCOSE BLDC GLUCOMTR-MCNC: 151 MG/DL (ref 70–130)
GLUCOSE BLDC GLUCOMTR-MCNC: 154 MG/DL (ref 70–130)
GLUCOSE BLDC GLUCOMTR-MCNC: 156 MG/DL (ref 70–130)
GLUCOSE BLDC GLUCOMTR-MCNC: 156 MG/DL (ref 70–130)
GLUCOSE BLDC GLUCOMTR-MCNC: 165 MG/DL (ref 70–130)
GLUCOSE BLDC GLUCOMTR-MCNC: 165 MG/DL (ref 70–130)
GLUCOSE BLDC GLUCOMTR-MCNC: 172 MG/DL (ref 70–130)
GLUCOSE BLDC GLUCOMTR-MCNC: 179 MG/DL (ref 70–130)
GLUCOSE BLDC GLUCOMTR-MCNC: 188 MG/DL (ref 70–130)
GLUCOSE BLDC GLUCOMTR-MCNC: 195 MG/DL (ref 70–130)
GLUCOSE BLDC GLUCOMTR-MCNC: 200 MG/DL (ref 70–130)
GLUCOSE BLDC GLUCOMTR-MCNC: 202 MG/DL (ref 70–130)
GLUCOSE BLDC GLUCOMTR-MCNC: 38 MG/DL (ref 70–130)
HCO3 BLDA-SCNC: 23.1 MMOL/L (ref 22–26)
HCO3 BLDA-SCNC: 24.6 MMOL/L (ref 22–26)
HCO3 BLDA-SCNC: 25 MMOL/L (ref 22–26)
HCO3 BLDA-SCNC: 25.3 MMOL/L (ref 22–28)
HCO3 BLDA-SCNC: 26 MMOL/L (ref 22–26)
HCO3 BLDA-SCNC: 26.7 MMOL/L (ref 22–26)
HCO3 BLDA-SCNC: 26.8 MMOL/L (ref 22–28)
HCO3 BLDA-SCNC: 26.8 MMOL/L (ref 22–28)
HCO3 BLDA-SCNC: 27.4 MMOL/L (ref 22–26)
HCO3 BLDA-SCNC: 27.6 MMOL/L (ref 22–28)
HCO3 BLDA-SCNC: 28.8 MMOL/L (ref 22–26)
HCT VFR BLD AUTO: 32.1 % (ref 40.4–52.2)
HCT VFR BLD AUTO: 37.1 % (ref 40.4–52.2)
HCT VFR BLD AUTO: 37.5 % (ref 40.4–52.2)
HCT VFR BLD AUTO: 41 % (ref 40.4–52.2)
HCT VFR BLDA CALC: 29 % (ref 38–51)
HCT VFR BLDA CALC: 30 % (ref 38–51)
HCT VFR BLDA CALC: 31 % (ref 38–51)
HCT VFR BLDA CALC: 31 % (ref 38–51)
HCT VFR BLDA CALC: 32 % (ref 38–51)
HCT VFR BLDA CALC: 33 % (ref 38–51)
HCT VFR BLDA CALC: 41 % (ref 38–51)
HGB BLD-MCNC: 10.6 G/DL (ref 13.7–17.6)
HGB BLD-MCNC: 12 G/DL (ref 13.7–17.6)
HGB BLD-MCNC: 12.1 G/DL (ref 13.7–17.6)
HGB BLD-MCNC: 13 G/DL (ref 13.7–17.6)
HGB BLDA-MCNC: 10.2 G/DL (ref 12–17)
HGB BLDA-MCNC: 10.5 G/DL (ref 12–17)
HGB BLDA-MCNC: 10.5 G/DL (ref 12–17)
HGB BLDA-MCNC: 10.9 G/DL (ref 12–17)
HGB BLDA-MCNC: 11.2 G/DL (ref 12–17)
HGB BLDA-MCNC: 13.9 G/DL (ref 12–17)
HGB BLDA-MCNC: 9.9 G/DL (ref 12–17)
HOROWITZ INDEX BLD+IHG-RTO: 100 %
HOROWITZ INDEX BLD+IHG-RTO: 40 %
IMM GRANULOCYTES # BLD: 0.06 10*3/MM3 (ref 0–0.03)
IMM GRANULOCYTES NFR BLD: 0.4 % (ref 0–0.5)
INR PPP: 1.5 (ref 0.9–1.1)
INR PPP: 1.64 (ref 0.9–1.1)
LYMPHOCYTES # BLD AUTO: 1.65 10*3/MM3 (ref 0.9–4.8)
LYMPHOCYTES NFR BLD AUTO: 12.1 % (ref 19.6–45.3)
MAGNESIUM SERPL-MCNC: 2.5 MG/DL (ref 1.6–2.4)
MAGNESIUM SERPL-MCNC: 2.6 MG/DL (ref 1.6–2.4)
MCH RBC QN AUTO: 29.5 PG (ref 27–32.7)
MCH RBC QN AUTO: 29.7 PG (ref 27–32.7)
MCH RBC QN AUTO: 29.8 PG (ref 27–32.7)
MCH RBC QN AUTO: 30.1 PG (ref 27–32.7)
MCHC RBC AUTO-ENTMCNC: 31.7 G/DL (ref 32.6–36.4)
MCHC RBC AUTO-ENTMCNC: 32.3 G/DL (ref 32.6–36.4)
MCHC RBC AUTO-ENTMCNC: 32.3 G/DL (ref 32.6–36.4)
MCHC RBC AUTO-ENTMCNC: 33 G/DL (ref 32.6–36.4)
MCV RBC AUTO: 91.2 FL (ref 79.8–96.2)
MCV RBC AUTO: 92.1 FL (ref 79.8–96.2)
MCV RBC AUTO: 92.1 FL (ref 79.8–96.2)
MCV RBC AUTO: 93.2 FL (ref 79.8–96.2)
MODALITY: ABNORMAL
MONOCYTES # BLD AUTO: 0.8 10*3/MM3 (ref 0.2–1.2)
MONOCYTES NFR BLD AUTO: 5.8 % (ref 5–12)
NEUTROPHILS # BLD AUTO: 11.1 10*3/MM3 (ref 1.9–8.1)
NEUTROPHILS NFR BLD AUTO: 81.1 % (ref 42.7–76)
O2 A-A PPRESDIFF RESPIRATORY: 0.4 MMHG
O2 A-A PPRESDIFF RESPIRATORY: 0.7 MMHG
PCO2 BLDA: 41.9 MM HG (ref 35–45)
PCO2 BLDA: 48.1 MM HG (ref 35–45)
PCO2 BLDA: 48.2 MM HG (ref 35–45)
PCO2 BLDA: 51.4 MM HG (ref 35–45)
PCO2 BLDA: 52.7 MM HG (ref 35–45)
PCO2 BLDA: 54.3 MM HG (ref 35–45)
PCO2 BLDA: 55.6 MM HG (ref 35–45)
PCO2 BLDA: 56 MM HG (ref 35–45)
PCO2 BLDA: 56.1 MM HG (ref 35–45)
PCO2 BLDA: 56.3 MM HG (ref 35–45)
PCO2 BLDA: 57.3 MM HG (ref 35–45)
PEEP RESPIRATORY: 5 CM[H2O]
PH BLDA: 7.22 PH UNITS (ref 7.35–7.6)
PH BLDA: 7.25 PH UNITS (ref 7.35–7.6)
PH BLDA: 7.28 PH UNITS (ref 7.35–7.45)
PH BLDA: 7.29 PH UNITS (ref 7.35–7.45)
PH BLDA: 7.29 PH UNITS (ref 7.35–7.6)
PH BLDA: 7.32 PH UNITS (ref 7.35–7.6)
PH BLDA: 7.33 PH UNITS (ref 7.35–7.45)
PH BLDA: 7.33 PH UNITS (ref 7.35–7.6)
PH BLDA: 7.34 PH UNITS (ref 7.35–7.6)
PH BLDA: 7.34 PH UNITS (ref 7.35–7.6)
PH BLDA: 7.39 PH UNITS (ref 7.35–7.45)
PHOSPHATE SERPL-MCNC: 3.7 MG/DL (ref 2.5–4.5)
PHOSPHATE SERPL-MCNC: 4 MG/DL (ref 2.5–4.5)
PLATELET # BLD AUTO: 102 10*3/MM3 (ref 140–500)
PLATELET # BLD AUTO: 104 10*3/MM3 (ref 140–500)
PLATELET # BLD AUTO: 118 10*3/MM3 (ref 140–500)
PLATELET # BLD AUTO: 128 10*3/MM3 (ref 140–500)
PMV BLD AUTO: 10 FL (ref 6–12)
PMV BLD AUTO: 10.2 FL (ref 6–12)
PMV BLD AUTO: 10.2 FL (ref 6–12)
PMV BLD AUTO: 9.9 FL (ref 6–12)
PO2 BLDA: 104.4 MM HG (ref 80–100)
PO2 BLDA: 108.3 MM HG (ref 80–100)
PO2 BLDA: 269 MMHG (ref 80–105)
PO2 BLDA: 385 MMHG (ref 80–105)
PO2 BLDA: 416 MMHG (ref 80–105)
PO2 BLDA: 420 MMHG (ref 80–105)
PO2 BLDA: 459.5 MM HG (ref 80–100)
PO2 BLDA: 488 MMHG (ref 80–105)
PO2 BLDA: 53 MMHG (ref 80–105)
PO2 BLDA: 576 MMHG (ref 80–105)
PO2 BLDA: 91 MM HG (ref 80–100)
POTASSIUM BLD-SCNC: 4 MMOL/L (ref 3.5–5.2)
POTASSIUM BLD-SCNC: 4.6 MMOL/L (ref 3.5–5.2)
POTASSIUM BLDA-SCNC: 3.4 MMOL/L (ref 3.5–4.9)
POTASSIUM BLDA-SCNC: 3.7 MMOL/L (ref 3.5–4.9)
POTASSIUM BLDA-SCNC: 3.8 MMOL/L (ref 3.5–4.9)
POTASSIUM BLDA-SCNC: 4 MMOL/L (ref 3.5–4.9)
POTASSIUM BLDA-SCNC: 4.2 MMOL/L (ref 3.5–4.9)
POTASSIUM BLDA-SCNC: 4.2 MMOL/L (ref 3.5–4.9)
POTASSIUM BLDA-SCNC: 4.3 MMOL/L (ref 3.5–4.9)
PROTHROMBIN TIME: 17.6 SECONDS (ref 11.7–14.2)
PROTHROMBIN TIME: 18.9 SECONDS (ref 11.7–14.2)
PSV: 10 CMH2O
RBC # BLD AUTO: 3.52 10*6/MM3 (ref 4.6–6)
RBC # BLD AUTO: 4.03 10*6/MM3 (ref 4.6–6)
RBC # BLD AUTO: 4.07 10*6/MM3 (ref 4.6–6)
RBC # BLD AUTO: 4.4 10*6/MM3 (ref 4.6–6)
SAO2 % BLDA: 100 % (ref 95–98)
SAO2 % BLDA: 79 % (ref 95–98)
SAO2 % BLDCOA: 100 % (ref 92–99)
SAO2 % BLDCOA: 95.7 % (ref 92–99)
SAO2 % BLDCOA: 97.4 % (ref 92–99)
SAO2 % BLDCOA: 98.1 % (ref 92–99)
SET MECH RESP RATE: 14
SET MECH RESP RATE: 16
SET MECH RESP RATE: 19
SODIUM BLD-SCNC: 145 MMOL/L (ref 136–145)
SODIUM BLD-SCNC: 146 MMOL/L (ref 136–145)
TOTAL RATE: 14 BREATHS/MINUTE
TOTAL RATE: 16 BREATHS/MINUTE
TOTAL RATE: 18 BREATHS/MINUTE
VENTILATOR MODE: ABNORMAL
VT ON VENT VENT: 445 ML
VT ON VENT VENT: 509 ML
VT ON VENT VENT: 700 ML
VT ON VENT VENT: 700 ML
WBC NRBC COR # BLD: 10.25 10*3/MM3 (ref 4.5–10.7)
WBC NRBC COR # BLD: 13.69 10*3/MM3 (ref 4.5–10.7)
WBC NRBC COR # BLD: 13.93 10*3/MM3 (ref 4.5–10.7)
WBC NRBC COR # BLD: 18.64 10*3/MM3 (ref 4.5–10.7)

## 2017-01-17 PROCEDURE — 25010000002 HEPARIN (PORCINE) PER 1000 UNITS: Performed by: ANESTHESIOLOGY

## 2017-01-17 PROCEDURE — 021209W BYPASS CORONARY ARTERY, THREE ARTERIES FROM AORTA WITH AUTOLOGOUS VENOUS TISSUE, OPEN APPROACH: ICD-10-PCS | Performed by: THORACIC SURGERY (CARDIOTHORACIC VASCULAR SURGERY)

## 2017-01-17 PROCEDURE — 25010000002 PROPOFOL 1000 MG/ML EMULSION: Performed by: ANESTHESIOLOGY

## 2017-01-17 PROCEDURE — 80069 RENAL FUNCTION PANEL: CPT | Performed by: THORACIC SURGERY (CARDIOTHORACIC VASCULAR SURGERY)

## 2017-01-17 PROCEDURE — 33533 CABG ARTERIAL SINGLE: CPT | Performed by: THORACIC SURGERY (CARDIOTHORACIC VASCULAR SURGERY)

## 2017-01-17 PROCEDURE — 25010000002 ALBUMIN HUMAN 5% PER 50 ML: Performed by: THORACIC SURGERY (CARDIOTHORACIC VASCULAR SURGERY)

## 2017-01-17 PROCEDURE — 25010000002 ALBUMIN HUMAN 5% PER 50 ML: Performed by: ANESTHESIOLOGY

## 2017-01-17 PROCEDURE — C1713 ANCHOR/SCREW BN/BN,TIS/BN: HCPCS | Performed by: THORACIC SURGERY (CARDIOTHORACIC VASCULAR SURGERY)

## 2017-01-17 PROCEDURE — 94799 UNLISTED PULMONARY SVC/PX: CPT

## 2017-01-17 PROCEDURE — 25010000002 MAGNESIUM SULFATE IN D5W 1G/100ML (PREMIX) 10-5 MG/ML-% SOLUTION: Performed by: THORACIC SURGERY (CARDIOTHORACIC VASCULAR SURGERY)

## 2017-01-17 PROCEDURE — 33508 ENDOSCOPIC VEIN HARVEST: CPT | Performed by: THORACIC SURGERY (CARDIOTHORACIC VASCULAR SURGERY)

## 2017-01-17 PROCEDURE — 93005 ELECTROCARDIOGRAM TRACING: CPT | Performed by: THORACIC SURGERY (CARDIOTHORACIC VASCULAR SURGERY)

## 2017-01-17 PROCEDURE — 25010000002 MORPHINE PER 10 MG: Performed by: THORACIC SURGERY (CARDIOTHORACIC VASCULAR SURGERY)

## 2017-01-17 PROCEDURE — 82962 GLUCOSE BLOOD TEST: CPT

## 2017-01-17 PROCEDURE — 25010000002 ONDANSETRON PER 1 MG: Performed by: ANESTHESIOLOGY

## 2017-01-17 PROCEDURE — 33519 CABG ARTERY-VEIN THREE: CPT | Performed by: THORACIC SURGERY (CARDIOTHORACIC VASCULAR SURGERY)

## 2017-01-17 PROCEDURE — 82803 BLOOD GASES ANY COMBINATION: CPT

## 2017-01-17 PROCEDURE — 85027 COMPLETE CBC AUTOMATED: CPT | Performed by: THORACIC SURGERY (CARDIOTHORACIC VASCULAR SURGERY)

## 2017-01-17 PROCEDURE — C1729 CATH, DRAINAGE: HCPCS | Performed by: THORACIC SURGERY (CARDIOTHORACIC VASCULAR SURGERY)

## 2017-01-17 PROCEDURE — 25010000002 FUROSEMIDE PER 20 MG

## 2017-01-17 PROCEDURE — P9041 ALBUMIN (HUMAN),5%, 50ML: HCPCS | Performed by: ANESTHESIOLOGY

## 2017-01-17 PROCEDURE — 63710000001 INSULIN REGULAR HUMAN PER 5 UNITS: Performed by: ANESTHESIOLOGY

## 2017-01-17 PROCEDURE — 25010000002 ALBUMIN HUMAN 25% PER 50 ML

## 2017-01-17 PROCEDURE — 94002 VENT MGMT INPAT INIT DAY: CPT

## 2017-01-17 PROCEDURE — 06BP4ZZ EXCISION OF RIGHT SAPHENOUS VEIN, PERCUTANEOUS ENDOSCOPIC APPROACH: ICD-10-PCS | Performed by: THORACIC SURGERY (CARDIOTHORACIC VASCULAR SURGERY)

## 2017-01-17 PROCEDURE — 85025 COMPLETE CBC W/AUTO DIFF WBC: CPT | Performed by: THORACIC SURGERY (CARDIOTHORACIC VASCULAR SURGERY)

## 2017-01-17 PROCEDURE — 25010000002 HEPARIN (PORCINE) PER 1000 UNITS: Performed by: THORACIC SURGERY (CARDIOTHORACIC VASCULAR SURGERY)

## 2017-01-17 PROCEDURE — 88305 TISSUE EXAM BY PATHOLOGIST: CPT | Performed by: THORACIC SURGERY (CARDIOTHORACIC VASCULAR SURGERY)

## 2017-01-17 PROCEDURE — 25010000002 VANCOMYCIN PER 500 MG

## 2017-01-17 PROCEDURE — 86901 BLOOD TYPING SEROLOGIC RH(D): CPT

## 2017-01-17 PROCEDURE — 02100A9 BYPASS CORONARY ARTERY, ONE ARTERY FROM LEFT INTERNAL MAMMARY WITH AUTOLOGOUS ARTERIAL TISSUE, OPEN APPROACH: ICD-10-PCS | Performed by: THORACIC SURGERY (CARDIOTHORACIC VASCULAR SURGERY)

## 2017-01-17 PROCEDURE — 25010000002 PROTAMINE SULFATE PER 10 MG: Performed by: ANESTHESIOLOGY

## 2017-01-17 PROCEDURE — 33405 REPLACEMENT AORTIC VALVE OPN: CPT | Performed by: THORACIC SURGERY (CARDIOTHORACIC VASCULAR SURGERY)

## 2017-01-17 PROCEDURE — 25010000002 MORPHINE PER 10 MG: Performed by: ANESTHESIOLOGY

## 2017-01-17 PROCEDURE — 93010 ELECTROCARDIOGRAM REPORT: CPT | Performed by: INTERNAL MEDICINE

## 2017-01-17 PROCEDURE — 25010000002 MIDAZOLAM PER 1 MG: Performed by: ANESTHESIOLOGY

## 2017-01-17 PROCEDURE — 85730 THROMBOPLASTIN TIME PARTIAL: CPT | Performed by: THORACIC SURGERY (CARDIOTHORACIC VASCULAR SURGERY)

## 2017-01-17 PROCEDURE — C1751 CATH, INF, PER/CENT/MIDLINE: HCPCS | Performed by: THORACIC SURGERY (CARDIOTHORACIC VASCULAR SURGERY)

## 2017-01-17 PROCEDURE — 25010000003 CEFAZOLIN IN DEXTROSE 2-4 GM/100ML-% SOLUTION: Performed by: THORACIC SURGERY (CARDIOTHORACIC VASCULAR SURGERY)

## 2017-01-17 PROCEDURE — P9041 ALBUMIN (HUMAN),5%, 50ML: HCPCS | Performed by: THORACIC SURGERY (CARDIOTHORACIC VASCULAR SURGERY)

## 2017-01-17 PROCEDURE — 25010000002 MAGNESIUM SULFATE PER 500 MG OF MAGNESIUM: Performed by: ANESTHESIOLOGY

## 2017-01-17 PROCEDURE — 25010000002 METOCLOPRAMIDE PER 10 MG: Performed by: THORACIC SURGERY (CARDIOTHORACIC VASCULAR SURGERY)

## 2017-01-17 PROCEDURE — 02RF0JZ REPLACEMENT OF AORTIC VALVE WITH SYNTHETIC SUBSTITUTE, OPEN APPROACH: ICD-10-PCS | Performed by: THORACIC SURGERY (CARDIOTHORACIC VASCULAR SURGERY)

## 2017-01-17 PROCEDURE — 85610 PROTHROMBIN TIME: CPT | Performed by: THORACIC SURGERY (CARDIOTHORACIC VASCULAR SURGERY)

## 2017-01-17 PROCEDURE — 83735 ASSAY OF MAGNESIUM: CPT | Performed by: THORACIC SURGERY (CARDIOTHORACIC VASCULAR SURGERY)

## 2017-01-17 PROCEDURE — 86900 BLOOD TYPING SEROLOGIC ABO: CPT

## 2017-01-17 PROCEDURE — P9046 ALBUMIN (HUMAN), 25%, 20 ML: HCPCS

## 2017-01-17 PROCEDURE — 82330 ASSAY OF CALCIUM: CPT | Performed by: THORACIC SURGERY (CARDIOTHORACIC VASCULAR SURGERY)

## 2017-01-17 PROCEDURE — A4648 IMPLANTABLE TISSUE MARKER: HCPCS | Performed by: THORACIC SURGERY (CARDIOTHORACIC VASCULAR SURGERY)

## 2017-01-17 PROCEDURE — 5A1221Z PERFORMANCE OF CARDIAC OUTPUT, CONTINUOUS: ICD-10-PCS | Performed by: THORACIC SURGERY (CARDIOTHORACIC VASCULAR SURGERY)

## 2017-01-17 PROCEDURE — 85384 FIBRINOGEN ACTIVITY: CPT | Performed by: THORACIC SURGERY (CARDIOTHORACIC VASCULAR SURGERY)

## 2017-01-17 PROCEDURE — B245ZZ4 ULTRASONOGRAPHY OF LEFT HEART, TRANSESOPHAGEAL: ICD-10-PCS | Performed by: THORACIC SURGERY (CARDIOTHORACIC VASCULAR SURGERY)

## 2017-01-17 PROCEDURE — 93312 ECHO TRANSESOPHAGEAL: CPT

## 2017-01-17 PROCEDURE — 25010000002 FENTANYL CITRATE (PF) 100 MCG/2ML SOLUTION: Performed by: ANESTHESIOLOGY

## 2017-01-17 PROCEDURE — 25010000002 HEPARIN (PORCINE) PER 1000 UNITS

## 2017-01-17 PROCEDURE — 25010000002 PROPOFOL 10 MG/ML EMULSION: Performed by: ANESTHESIOLOGY

## 2017-01-17 DEVICE — VLV PERICARD PERIMOUNT MAGNA EASE 25: Type: IMPLANTABLE DEVICE | Site: HEART | Status: FUNCTIONAL

## 2017-01-17 DEVICE — SS SUTURE, 4 PER SLEEVE
Type: IMPLANTABLE DEVICE | Site: STERNUM | Status: FUNCTIONAL
Brand: MYO/WIRE II

## 2017-01-17 DEVICE — SS SUTURE, 3 PER SLEEVE
Type: IMPLANTABLE DEVICE | Site: STERNUM | Status: FUNCTIONAL
Brand: MYO/WIRE II

## 2017-01-17 DEVICE — CORONARY ARTERY BYPASS GRAFT MARKERS, STAINLESS STEEL, DISTAL, WITHOUT HOLDER
Type: IMPLANTABLE DEVICE | Site: HEART | Status: FUNCTIONAL
Brand: ANASTOMARK CORONARY ARTERY BYPASS GRAFT MARKERS, STAINLESS STEEL, DISTAL

## 2017-01-17 RX ORDER — SODIUM CHLORIDE 9 MG/ML
50 INJECTION, SOLUTION INTRAVENOUS CONTINUOUS
Status: DISCONTINUED | OUTPATIENT
Start: 2017-01-17 | End: 2017-01-17

## 2017-01-17 RX ORDER — MIDAZOLAM HYDROCHLORIDE 1 MG/ML
2 INJECTION INTRAMUSCULAR; INTRAVENOUS
Status: DISCONTINUED | OUTPATIENT
Start: 2017-01-17 | End: 2017-01-18

## 2017-01-17 RX ORDER — MAGNESIUM SULFATE HEPTAHYDRATE 500 MG/ML
INJECTION, SOLUTION INTRAMUSCULAR; INTRAVENOUS AS NEEDED
Status: DISCONTINUED | OUTPATIENT
Start: 2017-01-17 | End: 2017-01-17 | Stop reason: SURG

## 2017-01-17 RX ORDER — ALBUMIN, HUMAN INJ 5% 5 %
1500 SOLUTION INTRAVENOUS AS NEEDED
Status: DISCONTINUED | OUTPATIENT
Start: 2017-01-17 | End: 2017-01-18

## 2017-01-17 RX ORDER — DOPAMINE HYDROCHLORIDE 160 MG/100ML
2-20 INJECTION, SOLUTION INTRAVENOUS CONTINUOUS PRN
Status: DISCONTINUED | OUTPATIENT
Start: 2017-01-17 | End: 2017-01-18

## 2017-01-17 RX ORDER — ASPIRIN 81 MG/1
81 TABLET ORAL DAILY
Status: DISCONTINUED | OUTPATIENT
Start: 2017-01-18 | End: 2017-01-25 | Stop reason: HOSPADM

## 2017-01-17 RX ORDER — CHLORHEXIDINE GLUCONATE 0.12 MG/ML
15 RINSE ORAL EVERY 12 HOURS
Status: DISCONTINUED | OUTPATIENT
Start: 2017-01-17 | End: 2017-01-18

## 2017-01-17 RX ORDER — SODIUM CHLORIDE 0.9 % (FLUSH) 0.9 %
30 SYRINGE (ML) INJECTION ONCE AS NEEDED
Status: DISCONTINUED | OUTPATIENT
Start: 2017-01-17 | End: 2017-01-18

## 2017-01-17 RX ORDER — HEPARIN SODIUM 1000 [USP'U]/ML
INJECTION, SOLUTION INTRAVENOUS; SUBCUTANEOUS AS NEEDED
Status: DISCONTINUED | OUTPATIENT
Start: 2017-01-17 | End: 2017-01-17 | Stop reason: SURG

## 2017-01-17 RX ORDER — MAGNESIUM HYDROXIDE 1200 MG/15ML
LIQUID ORAL AS NEEDED
Status: DISCONTINUED | OUTPATIENT
Start: 2017-01-17 | End: 2017-01-17 | Stop reason: HOSPADM

## 2017-01-17 RX ORDER — ONDANSETRON 2 MG/ML
4 INJECTION INTRAMUSCULAR; INTRAVENOUS EVERY 6 HOURS PRN
Status: DISCONTINUED | OUTPATIENT
Start: 2017-01-17 | End: 2017-01-25 | Stop reason: HOSPADM

## 2017-01-17 RX ORDER — MORPHINE SULFATE 2 MG/ML
2 INJECTION, SOLUTION INTRAMUSCULAR; INTRAVENOUS
Status: DISCONTINUED | OUTPATIENT
Start: 2017-01-17 | End: 2017-01-18

## 2017-01-17 RX ORDER — PROMETHAZINE HYDROCHLORIDE 25 MG/ML
12.5 INJECTION, SOLUTION INTRAMUSCULAR; INTRAVENOUS EVERY 6 HOURS PRN
Status: DISCONTINUED | OUTPATIENT
Start: 2017-01-17 | End: 2017-01-25 | Stop reason: HOSPADM

## 2017-01-17 RX ORDER — SODIUM CHLORIDE 9 MG/ML
30 INJECTION, SOLUTION INTRAVENOUS CONTINUOUS
Status: DISCONTINUED | OUTPATIENT
Start: 2017-01-17 | End: 2017-01-18

## 2017-01-17 RX ORDER — ROCURONIUM BROMIDE 10 MG/ML
INJECTION, SOLUTION INTRAVENOUS AS NEEDED
Status: DISCONTINUED | OUTPATIENT
Start: 2017-01-17 | End: 2017-01-17 | Stop reason: SURG

## 2017-01-17 RX ORDER — FENTANYL CITRATE 50 UG/ML
INJECTION, SOLUTION INTRAMUSCULAR; INTRAVENOUS AS NEEDED
Status: DISCONTINUED | OUTPATIENT
Start: 2017-01-17 | End: 2017-01-17 | Stop reason: SURG

## 2017-01-17 RX ORDER — SUFENTANIL CITRATE 50 UG/ML
INJECTION EPIDURAL; INTRAVENOUS AS NEEDED
Status: DISCONTINUED | OUTPATIENT
Start: 2017-01-17 | End: 2017-01-17 | Stop reason: SURG

## 2017-01-17 RX ORDER — PROMETHAZINE HYDROCHLORIDE 25 MG/1
12.5 TABLET ORAL EVERY 6 HOURS PRN
Status: DISCONTINUED | OUTPATIENT
Start: 2017-01-17 | End: 2017-01-25 | Stop reason: HOSPADM

## 2017-01-17 RX ORDER — CYCLOBENZAPRINE HCL 10 MG
10 TABLET ORAL EVERY 8 HOURS PRN
Status: DISCONTINUED | OUTPATIENT
Start: 2017-01-18 | End: 2017-01-25 | Stop reason: HOSPADM

## 2017-01-17 RX ORDER — SENNA AND DOCUSATE SODIUM 50; 8.6 MG/1; MG/1
2 TABLET, FILM COATED ORAL NIGHTLY
Status: DISCONTINUED | OUTPATIENT
Start: 2017-01-18 | End: 2017-01-25 | Stop reason: HOSPADM

## 2017-01-17 RX ORDER — SODIUM CHLORIDE 9 MG/ML
INJECTION, SOLUTION INTRAVENOUS CONTINUOUS PRN
Status: DISCONTINUED | OUTPATIENT
Start: 2017-01-17 | End: 2017-01-17 | Stop reason: SURG

## 2017-01-17 RX ORDER — BISACODYL 10 MG
10 SUPPOSITORY, RECTAL RECTAL DAILY PRN
Status: DISCONTINUED | OUTPATIENT
Start: 2017-01-18 | End: 2017-01-25 | Stop reason: HOSPADM

## 2017-01-17 RX ORDER — PANTOPRAZOLE SODIUM 40 MG/1
40 TABLET, DELAYED RELEASE ORAL
Status: DISCONTINUED | OUTPATIENT
Start: 2017-01-18 | End: 2017-01-20

## 2017-01-17 RX ORDER — ACETAMINOPHEN 650 MG/1
650 SUPPOSITORY RECTAL EVERY 4 HOURS PRN
Status: DISCONTINUED | OUTPATIENT
Start: 2017-01-17 | End: 2017-01-18

## 2017-01-17 RX ORDER — DEXMEDETOMIDINE HYDROCHLORIDE 4 UG/ML
.2-1.5 INJECTION, SOLUTION INTRAVENOUS CONTINUOUS PRN
Status: DISCONTINUED | OUTPATIENT
Start: 2017-01-17 | End: 2017-01-18

## 2017-01-17 RX ORDER — PAPAVERINE HYDROCHLORIDE 30 MG/ML
INJECTION INTRAMUSCULAR; INTRAVENOUS AS NEEDED
Status: DISCONTINUED | OUTPATIENT
Start: 2017-01-17 | End: 2017-01-17 | Stop reason: HOSPADM

## 2017-01-17 RX ORDER — CEFAZOLIN SODIUM 2 G/100ML
2 INJECTION, SOLUTION INTRAVENOUS ONCE
Status: COMPLETED | OUTPATIENT
Start: 2017-01-17 | End: 2017-01-17

## 2017-01-17 RX ORDER — MIDAZOLAM HYDROCHLORIDE 1 MG/ML
INJECTION INTRAMUSCULAR; INTRAVENOUS AS NEEDED
Status: DISCONTINUED | OUTPATIENT
Start: 2017-01-17 | End: 2017-01-17 | Stop reason: SURG

## 2017-01-17 RX ORDER — POTASSIUM CHLORIDE 1.5 G/1.77G
40 POWDER, FOR SOLUTION ORAL AS NEEDED
Status: DISCONTINUED | OUTPATIENT
Start: 2017-01-17 | End: 2017-01-22

## 2017-01-17 RX ORDER — POTASSIUM CHLORIDE 7.45 MG/ML
10 INJECTION INTRAVENOUS
Status: DISCONTINUED | OUTPATIENT
Start: 2017-01-17 | End: 2017-01-25 | Stop reason: HOSPADM

## 2017-01-17 RX ORDER — FAMOTIDINE 10 MG/ML
20 INJECTION, SOLUTION INTRAVENOUS
Status: COMPLETED | OUTPATIENT
Start: 2017-01-17 | End: 2017-01-17

## 2017-01-17 RX ORDER — ALPRAZOLAM 0.25 MG/1
0.25 TABLET ORAL EVERY 8 HOURS PRN
Status: DISCONTINUED | OUTPATIENT
Start: 2017-01-17 | End: 2017-01-25 | Stop reason: HOSPADM

## 2017-01-17 RX ORDER — PANTOPRAZOLE SODIUM 40 MG/10ML
40 INJECTION, POWDER, LYOPHILIZED, FOR SOLUTION INTRAVENOUS
Status: DISCONTINUED | OUTPATIENT
Start: 2017-01-18 | End: 2017-01-18

## 2017-01-17 RX ORDER — ATORVASTATIN CALCIUM 40 MG/1
40 TABLET, FILM COATED ORAL NIGHTLY
Status: DISCONTINUED | OUTPATIENT
Start: 2017-01-17 | End: 2017-01-25 | Stop reason: HOSPADM

## 2017-01-17 RX ORDER — MIDAZOLAM HYDROCHLORIDE 1 MG/ML
2 INJECTION INTRAMUSCULAR; INTRAVENOUS
Status: COMPLETED | OUTPATIENT
Start: 2017-01-17 | End: 2017-01-17

## 2017-01-17 RX ORDER — ACETAMINOPHEN 10 MG/ML
1000 INJECTION, SOLUTION INTRAVENOUS ONCE
Status: COMPLETED | OUTPATIENT
Start: 2017-01-17 | End: 2017-01-17

## 2017-01-17 RX ORDER — NALOXONE HCL 0.4 MG/ML
0.4 VIAL (ML) INJECTION
Status: DISCONTINUED | OUTPATIENT
Start: 2017-01-17 | End: 2017-01-18

## 2017-01-17 RX ORDER — PANTOPRAZOLE SODIUM 40 MG/10ML
40 INJECTION, POWDER, LYOPHILIZED, FOR SOLUTION INTRAVENOUS EVERY 24 HOURS
Status: DISCONTINUED | OUTPATIENT
Start: 2017-01-17 | End: 2017-01-18

## 2017-01-17 RX ORDER — POTASSIUM CHLORIDE 29.8 MG/ML
20 INJECTION INTRAVENOUS
Status: COMPLETED | OUTPATIENT
Start: 2017-01-17 | End: 2017-01-18

## 2017-01-17 RX ORDER — HYDROCODONE BITARTRATE AND ACETAMINOPHEN 5; 325 MG/1; MG/1
2 TABLET ORAL EVERY 4 HOURS PRN
Status: DISCONTINUED | OUTPATIENT
Start: 2017-01-17 | End: 2017-01-25 | Stop reason: HOSPADM

## 2017-01-17 RX ORDER — METOCLOPRAMIDE HYDROCHLORIDE 5 MG/ML
10 INJECTION INTRAMUSCULAR; INTRAVENOUS EVERY 6 HOURS
Status: COMPLETED | OUTPATIENT
Start: 2017-01-17 | End: 2017-01-18

## 2017-01-17 RX ORDER — ACETAMINOPHEN 325 MG/1
650 TABLET ORAL EVERY 4 HOURS PRN
Status: DISCONTINUED | OUTPATIENT
Start: 2017-01-17 | End: 2017-01-25 | Stop reason: HOSPADM

## 2017-01-17 RX ORDER — DEXTROSE MONOHYDRATE 25 G/50ML
INJECTION, SOLUTION INTRAVENOUS
Status: COMPLETED
Start: 2017-01-17 | End: 2017-01-17

## 2017-01-17 RX ORDER — SODIUM CHLORIDE 9 MG/ML
30 INJECTION, SOLUTION INTRAVENOUS CONTINUOUS PRN
Status: DISCONTINUED | OUTPATIENT
Start: 2017-01-17 | End: 2017-01-18

## 2017-01-17 RX ORDER — ALBUMIN, HUMAN INJ 5% 5 %
SOLUTION INTRAVENOUS CONTINUOUS PRN
Status: DISCONTINUED | OUTPATIENT
Start: 2017-01-17 | End: 2017-01-17 | Stop reason: SURG

## 2017-01-17 RX ORDER — NOREPINEPHRINE BITARTRATE 1 MG/ML
INJECTION, SOLUTION INTRAVENOUS CONTINUOUS PRN
Status: DISCONTINUED | OUTPATIENT
Start: 2017-01-17 | End: 2017-01-17 | Stop reason: SURG

## 2017-01-17 RX ORDER — POTASSIUM CHLORIDE 29.8 MG/ML
20 INJECTION INTRAVENOUS
Status: DISCONTINUED | OUTPATIENT
Start: 2017-01-17 | End: 2017-01-25 | Stop reason: HOSPADM

## 2017-01-17 RX ORDER — PROPOFOL 10 MG/ML
VIAL (ML) INTRAVENOUS AS NEEDED
Status: DISCONTINUED | OUTPATIENT
Start: 2017-01-17 | End: 2017-01-17 | Stop reason: SURG

## 2017-01-17 RX ORDER — ONDANSETRON 2 MG/ML
INJECTION INTRAMUSCULAR; INTRAVENOUS AS NEEDED
Status: DISCONTINUED | OUTPATIENT
Start: 2017-01-17 | End: 2017-01-17 | Stop reason: SURG

## 2017-01-17 RX ORDER — OXYCODONE HYDROCHLORIDE 5 MG/1
10 TABLET ORAL EVERY 4 HOURS PRN
Status: DISCONTINUED | OUTPATIENT
Start: 2017-01-17 | End: 2017-01-25 | Stop reason: HOSPADM

## 2017-01-17 RX ORDER — CEFAZOLIN SODIUM 2 G/100ML
2 INJECTION, SOLUTION INTRAVENOUS EVERY 8 HOURS
Status: COMPLETED | OUTPATIENT
Start: 2017-01-17 | End: 2017-01-19

## 2017-01-17 RX ORDER — POTASSIUM CHLORIDE 750 MG/1
40 CAPSULE, EXTENDED RELEASE ORAL AS NEEDED
Status: DISCONTINUED | OUTPATIENT
Start: 2017-01-17 | End: 2017-01-22

## 2017-01-17 RX ORDER — NITROGLYCERIN 20 MG/100ML
5-200 INJECTION INTRAVENOUS CONTINUOUS PRN
Status: DISCONTINUED | OUTPATIENT
Start: 2017-01-17 | End: 2017-01-18

## 2017-01-17 RX ORDER — NITROGLYCERIN 20 MG/100ML
INJECTION INTRAVENOUS CONTINUOUS PRN
Status: DISCONTINUED | OUTPATIENT
Start: 2017-01-17 | End: 2017-01-17 | Stop reason: SURG

## 2017-01-17 RX ORDER — PROTAMINE SULFATE 10 MG/ML
INJECTION, SOLUTION INTRAVENOUS AS NEEDED
Status: DISCONTINUED | OUTPATIENT
Start: 2017-01-17 | End: 2017-01-17 | Stop reason: SURG

## 2017-01-17 RX ADMIN — ONDANSETRON 4 MG: 2 INJECTION INTRAMUSCULAR; INTRAVENOUS at 11:13

## 2017-01-17 RX ADMIN — ALBUMIN (HUMAN): 0.05 SOLUTION INTRAVENOUS at 11:00

## 2017-01-17 RX ADMIN — MIDAZOLAM HYDROCHLORIDE 1 MG: 1 INJECTION, SOLUTION INTRAMUSCULAR; INTRAVENOUS at 06:48

## 2017-01-17 RX ADMIN — ROCURONIUM BROMIDE 50 MG: 10 INJECTION INTRAVENOUS at 07:05

## 2017-01-17 RX ADMIN — SODIUM CHLORIDE 2.8 UNITS/HR: 9 INJECTION, SOLUTION INTRAVENOUS at 10:17

## 2017-01-17 RX ADMIN — SUFENTANIL CITRATE 50 MCG: 50 INJECTION, SOLUTION EPIDURAL; INTRAVENOUS at 11:42

## 2017-01-17 RX ADMIN — FENTANYL CITRATE 50 MCG: 50 INJECTION, SOLUTION INTRAMUSCULAR; INTRAVENOUS at 11:35

## 2017-01-17 RX ADMIN — METOPROLOL TARTRATE 12.5 MG: 25 TABLET ORAL at 20:38

## 2017-01-17 RX ADMIN — SUFENTANIL CITRATE 25 MCG: 50 INJECTION, SOLUTION EPIDURAL; INTRAVENOUS at 12:10

## 2017-01-17 RX ADMIN — SODIUM CHLORIDE 15 ML/HR: 9 INJECTION, SOLUTION INTRAVENOUS at 12:45

## 2017-01-17 RX ADMIN — MIDAZOLAM 2 MG: 1 INJECTION INTRAMUSCULAR; INTRAVENOUS at 06:26

## 2017-01-17 RX ADMIN — SODIUM CHLORIDE: 9 INJECTION, SOLUTION INTRAVENOUS at 10:15

## 2017-01-17 RX ADMIN — NOREPINEPHRINE BITARTRATE 0.05 MCG/KG/MIN: 1 INJECTION, SOLUTION, CONCENTRATE INTRAVENOUS at 10:42

## 2017-01-17 RX ADMIN — MAGNESIUM SULFATE HEPTAHYDRATE 2 G: 500 INJECTION, SOLUTION INTRAMUSCULAR; INTRAVENOUS at 11:20

## 2017-01-17 RX ADMIN — CEFAZOLIN SODIUM 2 G: 2 INJECTION, SOLUTION INTRAVENOUS at 11:17

## 2017-01-17 RX ADMIN — DEXMEDETOMIDINE HYDROCHLORIDE 0.2 MCG/KG/HR: 4 INJECTION, SOLUTION INTRAVENOUS at 14:37

## 2017-01-17 RX ADMIN — PANTOPRAZOLE SODIUM 40 MG: 40 INJECTION, POWDER, FOR SOLUTION INTRAVENOUS at 12:45

## 2017-01-17 RX ADMIN — SODIUM CHLORIDE 30 ML/HR: 9 INJECTION, SOLUTION INTRAVENOUS at 17:20

## 2017-01-17 RX ADMIN — METOCLOPRAMIDE 10 MG: 5 INJECTION, SOLUTION INTRAMUSCULAR; INTRAVENOUS at 12:45

## 2017-01-17 RX ADMIN — ALBUMIN HUMAN 500 ML: 0.05 INJECTION, SOLUTION INTRAVENOUS at 22:14

## 2017-01-17 RX ADMIN — EPHEDRINE SULFATE 5 MG: 50 INJECTION INTRAMUSCULAR; INTRAVENOUS; SUBCUTANEOUS at 08:14

## 2017-01-17 RX ADMIN — PROPOFOL 50 MCG/KG/MIN: 10 INJECTION, EMULSION INTRAVENOUS at 09:12

## 2017-01-17 RX ADMIN — PROPOFOL: 10 INJECTION, EMULSION INTRAVENOUS at 11:42

## 2017-01-17 RX ADMIN — PROPOFOL 200 MG: 10 INJECTION, EMULSION INTRAVENOUS at 07:05

## 2017-01-17 RX ADMIN — ACETAMINOPHEN 1000 MG: 10 INJECTION, SOLUTION INTRAVENOUS at 12:44

## 2017-01-17 RX ADMIN — SODIUM CHLORIDE 50 ML/HR: 9 INJECTION, SOLUTION INTRAVENOUS at 06:25

## 2017-01-17 RX ADMIN — EPHEDRINE SULFATE 10 MG: 50 INJECTION INTRAMUSCULAR; INTRAVENOUS; SUBCUTANEOUS at 07:28

## 2017-01-17 RX ADMIN — EPHEDRINE SULFATE 10 MG: 50 INJECTION INTRAMUSCULAR; INTRAVENOUS; SUBCUTANEOUS at 08:21

## 2017-01-17 RX ADMIN — METOCLOPRAMIDE 10 MG: 5 INJECTION, SOLUTION INTRAMUSCULAR; INTRAVENOUS at 20:00

## 2017-01-17 RX ADMIN — ATORVASTATIN CALCIUM 40 MG: 40 TABLET, FILM COATED ORAL at 20:38

## 2017-01-17 RX ADMIN — METOPROLOL TARTRATE 12.5 MG: 25 TABLET ORAL at 06:23

## 2017-01-17 RX ADMIN — DEXTROSE MONOHYDRATE 12.5 G: 25 INJECTION, SOLUTION INTRAVENOUS at 12:44

## 2017-01-17 RX ADMIN — SODIUM CHLORIDE: 9 INJECTION, SOLUTION INTRAVENOUS at 07:35

## 2017-01-17 RX ADMIN — HEPARIN SODIUM 25000 UNITS: 1000 INJECTION, SOLUTION INTRAVENOUS; SUBCUTANEOUS at 08:29

## 2017-01-17 RX ADMIN — FENTANYL CITRATE 50 MCG: 50 INJECTION, SOLUTION INTRAMUSCULAR; INTRAVENOUS at 06:48

## 2017-01-17 RX ADMIN — HEPARIN SODIUM 5000 UNITS: 1000 INJECTION, SOLUTION INTRAVENOUS; SUBCUTANEOUS at 08:13

## 2017-01-17 RX ADMIN — FAMOTIDINE 20 MG: 10 INJECTION, SOLUTION INTRAVENOUS at 06:26

## 2017-01-17 RX ADMIN — HYDROCODONE BITARTRATE AND ACETAMINOPHEN 2 TABLET: 5; 325 TABLET ORAL at 20:00

## 2017-01-17 RX ADMIN — FENTANYL CITRATE 50 MCG: 50 INJECTION, SOLUTION INTRAMUSCULAR; INTRAVENOUS at 11:20

## 2017-01-17 RX ADMIN — SUFENTANIL CITRATE 50 MCG: 50 INJECTION, SOLUTION EPIDURAL; INTRAVENOUS at 07:57

## 2017-01-17 RX ADMIN — CEFAZOLIN SODIUM 2 G: 2 INJECTION, SOLUTION INTRAVENOUS at 16:43

## 2017-01-17 RX ADMIN — NITROGLYCERIN 0.25 MCG/KG/MIN: 20 INJECTION INTRAVENOUS at 09:13

## 2017-01-17 RX ADMIN — ROCURONIUM BROMIDE 30 MG: 10 INJECTION INTRAVENOUS at 12:05

## 2017-01-17 RX ADMIN — SUFENTANIL CITRATE 25 MCG: 50 INJECTION, SOLUTION EPIDURAL; INTRAVENOUS at 07:11

## 2017-01-17 RX ADMIN — ALBUMIN HUMAN 500 ML: 0.05 INJECTION, SOLUTION INTRAVENOUS at 14:03

## 2017-01-17 RX ADMIN — SUFENTANIL CITRATE 100 MCG: 50 INJECTION, SOLUTION EPIDURAL; INTRAVENOUS at 09:12

## 2017-01-17 RX ADMIN — CEFAZOLIN SODIUM 2 G: 2 INJECTION, SOLUTION INTRAVENOUS at 07:42

## 2017-01-17 RX ADMIN — SODIUM CHLORIDE: 9 INJECTION, SOLUTION INTRAVENOUS at 11:45

## 2017-01-17 RX ADMIN — FENTANYL CITRATE 100 MCG: 50 INJECTION, SOLUTION INTRAMUSCULAR; INTRAVENOUS at 07:11

## 2017-01-17 RX ADMIN — MAGNESIUM SULFATE HEPTAHYDRATE 1 G: 1 INJECTION, SOLUTION INTRAVENOUS at 20:38

## 2017-01-17 RX ADMIN — MORPHINE SULFATE 2 MG: 2 INJECTION, SOLUTION INTRAMUSCULAR; INTRAVENOUS at 20:00

## 2017-01-17 RX ADMIN — MORPHINE SULFATE 4 MG: 4 INJECTION, SOLUTION INTRAMUSCULAR; INTRAVENOUS at 06:29

## 2017-01-17 RX ADMIN — PROTAMINE SULFATE 300 MG: 10 INJECTION, SOLUTION INTRAVENOUS at 10:59

## 2017-01-17 NOTE — ANESTHESIA PROCEDURE NOTES
Central Line    Patient location during procedure: OR  Indications: central pressure monitoring and vascular access  Staff  Anesthesiologist: JANETT PÉREZ  Preanesthetic Checklist  Completed: patient identified, site marked, surgical consent, pre-op evaluation, timeout performed, IV checked, risks and benefits discussed and monitors and equipment checked  Central Line Prep  Sterile Tech:cap, gloves, gown, mask and sterile barriers  Prep: chloraprep  Patient monitoring: blood pressure monitoring, continuous pulse oximetry and EKG  Central Line Procedure  Laterality:right  Location:internal jugular  Catheter Type:double lumen and MAC  Catheter Size:9 Fr  Assessment  Post procedure:biopatch applied, line sutured and occlusive dressing applied  Assessement:blood return through all ports, free fluid flow and chest x-ray ordered  Complications:no  Patient Tolerance:patient tolerated the procedure well with no apparent complications

## 2017-01-17 NOTE — ANESTHESIA POSTPROCEDURE EVALUATION
Patient: Conor Faustin    Procedure Summary     Date Anesthesia Start Anesthesia Stop Room / Location    01/17/17 0646 1227  [facility] /  [facility]       Procedure Diagnosis Surgeon Provider    INTRAOPERATIVE THIAGO, MIDLINE STERNOTOMY, CORONARY ARTERY BYPASS GRAFTING X 4 UTILIZING LEFT INTERNAL MAMMARY ARTERY AND RIGHT ENDOSCOPICALLY HARVESTED GREATER SAPHENOUS VEIN, AORTIC VALVE REPLACEMENT (N/A Chest) Coronary artery disease involving native coronary artery of native heart, angina presence unspecified  (Coronary artery disease involving native coronary artery of native heart, angina presence unspecified [I25.10]) MD Jimmie Butler MD          Anesthesia Type: general  Last vitals  BP 94/53 (01/17/17 1225)    Temp      Pulse 79 (01/17/17 1250)   Resp 14 (01/17/17 1220)    SpO2 100 % (01/17/17 1300)      Post Anesthesia Care and Evaluation    Patient location during evaluation: PACU  Patient participation: complete - patient cannot participate  Post-procedure mental status: unable to assess...intubated and sedated.  Pain management: adequate  Anesthetic complications: No anesthetic complications    Cardiovascular status: acceptable  Respiratory status: ETT and intubated  Hydration status: stable    Comments: Patient unable to participate...intubated and sedated

## 2017-01-17 NOTE — ANESTHESIA PROCEDURE NOTES
[place] [facility]    Patient location during procedure: OR  Indications: central pressure monitoring and vascular access  Staff  Anesthesiologist: JANETT PÉREZ  Preanesthetic Checklist  Completed: patient identified, surgical consent, pre-op evaluation, timeout performed, IV checked, risks and benefits discussed and monitors and equipment checked  Central Line Prep  Sterile Tech:cap, gloves, gown, mask and sterile barriers  Prep: chloraprep  Patient monitoring: blood pressure monitoring, continuous pulse oximetry and EKG  Central Line Procedure  Laterality:right  Location:internal jugular  Catheter Type:[place]  Assessment  Assessement:blood return through all ports, free fluid flow and chest x-ray ordered  Complications:no  Patient Tolerance:patient tolerated the procedure well with no apparent complications

## 2017-01-17 NOTE — ANESTHESIA PROCEDURE NOTES
Airway  Urgency: elective    Airway not difficult    General Information and Staff    Patient location during procedure: OR  Anesthesiologist: JANETT PÉREZ    Indications and Patient Condition  Indications for airway management: airway protection    Preoxygenated: yes  Mask difficulty assessment: 1 - vent by mask    Final Airway Details  Final airway type: endotracheal airway      Successful airway: ETT  Cuffed: yes   Successful intubation technique: direct laryngoscopy  Endotracheal tube insertion site: oral  Blade: Daisy  Blade size: #4  ETT size: 8.0 (evac tube) mm  Cormack-Lehane Classification: grade I - full view of glottis  Placement verified by: chest auscultation, bronchoscopy and capnometry   Measured from: teeth  ETT to teeth (cm): 21  Number of attempts at approach: 1

## 2017-01-17 NOTE — ANESTHESIA PROCEDURE NOTES
Procedure Performed: THIAGO     Start Time:        End Time:      Preanesthesia Checklist:  Procedure being performed at surgeon's request and anesthesia consent obtained.    General Procedure Information  Diagnostic Indications for Echo:  assessment of surgical repair and hemodynamic monitoring  Physician Requesting Echo: YAIMA JOHANSEN  Location performed:  OR  Intubated  Bite block not placed  Heart visualized  Probe Insertion:  Easy  Probe Type:  Biplane  Modalities:  Continuous wave Doppler    Echocardiographic and Doppler Measurements    Ventricles    Right Ventricle:  Cavity size normal.  Hypertrophy not present.  Thrombus not present.  Global function moderately impaired.    Left Ventricle:  Cavity size dilated.  Hypertrophy present.  Thrombus not present.  Ejection Fraction 55%.      Ventricular Regional Function:  1- Basal Anteroseptal:  hypokinetic  2- Basal Anterior:  hypokinetic  3- Basal Anterolateral:  hypokinetic  4- Basal Inferolateral:  hypokinetic  5- Basal Inferior:  hypokinetic  6- Basal Inferoseptal:  hypokinetic  7- Mid Anteroseptal:  hypokinetic  8- Mid Anterior:  hypokinetic  9- Mid Anterolateral:  hypokinetic  10- Mid Inferolateral:  hypokinetic  11- Mid Inferior:  hypokinetic  12- Mid Inferoseptal:  hypokinetic  13- Apical Anterior:  hypokinetic  14- Apical Lateral:  hypokinetic  15- Apical Inferior:  hypokinetic  16- Apical Septal:  hypokinetic  17- [place]:  hypokinetic      Valves    Aortic Valve:  Annulus normal.  Stenosis not present.  Regurgitation +3.  Leaflets calcified.  Leaflet motions restricted.  Specific leaflet segments with abnormal motions are described in the following comments:       non coronary    Mitral Valve:  Annulus normal.  Stenosis not present.  Regurgitation +1.  Leaflets calcified and thickened.  Leaflet motions restricted.  Specific leaflet segments with abnormal motions are described in the following comments:       anterior    Tricuspid Valve:  Annulus normal.   Leaflets normal.  Leaflet motions normal.    Pulmonic Valve:  Annulus normal.          Aorta    Ascending Aorta:  Size normal.  Plaque thickness less than 3 mm.  Mobile plaque not present.    Aortic Arch:  Size dilated.  Plaque thickness less than 3 mm.  Mobile plaque not present.    Descending Aorta:  Size aneurysmal.  Mobile plaque not present.          Atria    Right Atrium:  Size normal.  Thrombus not present.  Tumor not present.  Device not present.      Left Atrium:  Size dilated.  Thrombus not present.  Tumor not present.  Device not present.    Left atrial appendage normal.      Septa    Atrial Septum:  Intra-atrial septal morphology normal.          Diastolic Function Measurements:  Diastolic Dysfunction Grade= I  E= ms  A= ms  E/A Ratio=   DT= ms  S/D=  IVRT=    Other Findings  Pericardium:  normal  Pleural Effusion:  none      Anesthesia Information  Performed Personally  Anesthesiologist:  JANETT PÉREZ

## 2017-01-17 NOTE — ANESTHESIA PROCEDURE NOTES
Arterial Line    Patient location during procedure: OR Line placed for hemodynamic monitoring and ABGs/Labs/ISTAT.  Performed By   Anesthesiologist: JANETT PÉREZ  Preanesthetic Checklist  Completed: patient identified, surgical consent, pre-op evaluation, timeout performed, IV checked, risks and benefits discussed and monitors and equipment checked  Arterial Line Prep   Sterile Tech: gloves, mask, cap and sterile barriers  Prep: ChloraPrep  Patient monitoring: blood pressure monitoring, continuous pulse oximetry and EKG  Arterial Line Procedure   Laterality:left  Location:  radial artery  Catheter size: 20 G   Guidance: ultrasound guided  PROCEDURE NOTE/ULTRASOUND INTERPRETATION.  Using ultrasound guidance the potential vascular sites for insertion of the catheter were visualized to determine the patency of the vessel to be used for vascular access.  After selecting the appropriate site for insertion, the needle was visualized under ultrasound being inserted into the radial artery, followed by ultrasound confirmation of wire and catheter placement. There were no abnormalities seen on ultrasound; an image was taken; and the patient tolerated the procedure with no complications.   Number of attempts: 1  Successful placement: yes          Post Assessment   Dressing Type: occlusive dressing applied, secured with tape and wrist guard applied.   Complications no  Circ/Move/Sens Assessment: normal and unchanged.   Patient Tolerance: patient tolerated the procedure well with no apparent complications  Additional Notes  Small hematoma noted

## 2017-01-17 NOTE — NURSING NOTE
Met with wife & daughter, Dalila,  while pt in CVR just post-surgery.  Provided & reviewed Cardiac Surgery D/C Reminders sheet, blue Home Activity Program for Cardiac Surgery Pts pamphlet, [facility] Cardiac Rehab Programs handout, and [facility] Cardiac Rehab flyer.  Pt lives closest to [facility] and will likely do his cardiac rehab there.  Wife understands to call to make that appointment and that pt is eligible to start once home health has discharged him.  I provided my name & phone number if they have questions later.

## 2017-01-17 NOTE — PERIOPERATIVE NURSING NOTE
"CALL PLACED TO DR. JOHANSEN. AWARE THAT PATIENT HAS NOT BEEN TAKING HIS METOPROLOL AT HOME \"FOR 2 MONTHS\", ALSO MADE AWARE OF ALLERGY TO BISOPROLOL(CAUSES ANGIOEDMA). METOPROLOL 12.5 MG PO ORDERED THIS AM. STATED TO GIVE DOSE PREOP  "

## 2017-01-17 NOTE — IP AVS SNAPSHOT
AFTER VISIT SUMMARY             Conor Faustin           About your hospitalization     You were admitted on:  January 17, 2017 You last received care in the:  [facility] CARDIOVASC UNIT       Procedures & Surgeries      Procedure(s) (LRB):  INTRAOPERATIVE THIAGO, MIDLINE STERNOTOMY, CORONARY ARTERY BYPASS GRAFTING X 4 UTILIZING LEFT INTERNAL MAMMARY ARTERY AND RIGHT ENDOSCOPICALLY HARVESTED GREATER SAPHENOUS VEIN, AORTIC VALVE REPLACEMENT (N/A)     1/17/2017     Surgeon(s):  Jassi De Jesus MD  -------------------      Medications    If you or your caregiver advised us that you are currently taking a medication and that medication is marked below as “Resume”, this simply indicates that we have reviewed those medications to make sure our new therapy recommendations do not interfere.  If you have concerns about medications other than those new ones which we are prescribing today, please consult the physician who prescribed them (or your primary physician).  Our review of your home medications is not meant to indicate that we are directing their use.             Your Medications      START taking these medications     acetaminophen 325 MG tablet   Take 2 tablets by mouth Every 4 (Four) Hours As Needed for mild pain (1-3).   Last time this was given:  1/23/2017  9:36 PM   Commonly known as:  TYLENOL   Next Dose Due:  None taken today; take your next dose when needed           aspirin 81 MG EC tablet   Take 1 tablet by mouth Daily.   Last time this was given:  1/25/2017  8:46 AM   Replaces:  aspirin 325 MG tablet   Next Dose Due:  Tomorrow 1/26           atorvastatin 40 MG tablet   Take 1 tablet by mouth Every Night.   Last time this was given:  1/24/2017  8:59 PM   Commonly known as:  LIPITOR   Next Dose Due:  Tonight 1/25           HYDROcodone-acetaminophen 5-325 MG per tablet   Take 2 tablets by mouth Every 4 (Four) Hours As Needed for moderate pain (4-6) for up to 2 days.   Last time this was  given:  1/23/2017 10:25 AM   Commonly known as:  NORCO   Next Dose Due:  None taken today; take your next dose when needed           Tiotropium Bromide-Olodaterol 2.5-2.5 MCG/ACT aerosol solution   Inhale 2 inhalers Daily.   Commonly known as:  STIOLTO RESPIMAT   Next Dose Due:  Today 1/25             CHANGE how you take these medications     metoprolol tartrate 25 MG tablet   Take 1 tablet by mouth Every 12 (Twelve) Hours.   Last time this was given:  1/25/2017  8:46 AM   Commonly known as:  LOPRESSOR   What changed:  when to take this   Next Dose Due:  Tonight 1/25             CONTINUE taking these medications     CoQ10 100 MG capsule   Take 400 mg by mouth Daily.   Next Dose Due:  Today 1/25           MULTIPLE VITAMINS ESSENTIAL PO   Take  by mouth.   Next Dose Due:  Today 1/25           nitroglycerin 0.4 MG SL tablet   Place 0.4 mg under the tongue Every 5 (Five) Minutes As Needed for chest pain. Take no more than 3 doses in 15 minutes.   Commonly known as:  NITROSTAT   Next Dose Due:  None taken today; take your next dose when needed             STOP taking these medications     aspirin 325 MG tablet   Replaced by:  aspirin 81 MG EC tablet           Chlorhexidine Gluconate Cloth 2 % pads           clopidogrel 75 MG tablet   Commonly known as:  PLAVIX           mupirocin 2 % nasal ointment   Commonly known as:  BACTROBAN           PERIDEX 0.12 % solution   Generic drug:  chlorhexidine                Where to Get Your Medications      These medications were sent to "[facility] [address] [place], [place] - [address] [facility] - [phone]  - [phone]   [address] [address]    Hours:  24-hours Phone:  [phone]     atorvastatin 40 MG tablet    metoprolol tartrate 25 MG tablet    Tiotropium Bromide-Olodaterol 2.5-2.5 MCG/ACT aerosol solution         You can get these medications from any pharmacy     Bring a paper prescription for each of these  medications     HYDROcodone-acetaminophen 5-325 MG per tablet       You don't need a prescription for these medications     acetaminophen 325 MG tablet    aspirin 81 MG EC tablet                  Your Medications      Your Medication List           Morning Noon Evening Bedtime As Needed    acetaminophen 325 MG tablet   Take 2 tablets by mouth Every 4 (Four) Hours As Needed for mild pain (1-3).   Commonly known as:  TYLENOL                                   aspirin 81 MG EC tablet   Take 1 tablet by mouth Daily.                                   atorvastatin 40 MG tablet   Take 1 tablet by mouth Every Night.   Commonly known as:  LIPITOR                                   CoQ10 100 MG capsule   Take 400 mg by mouth Daily.                                   HYDROcodone-acetaminophen 5-325 MG per tablet   Take 2 tablets by mouth Every 4 (Four) Hours As Needed for moderate pain (4-6) for up to 2 days.   Commonly known as:  NORCO                                   metoprolol tartrate 25 MG tablet   Take 1 tablet by mouth Every 12 (Twelve) Hours.   Commonly known as:  LOPRESSOR                                      MULTIPLE VITAMINS ESSENTIAL PO   Take  by mouth.                                   nitroglycerin 0.4 MG SL tablet   Place 0.4 mg under the tongue Every 5 (Five) Minutes As Needed for chest pain. Take no more than 3 doses in 15 minutes.   Commonly known as:  NITROSTAT                                   Tiotropium Bromide-Olodaterol 2.5-2.5 MCG/ACT aerosol solution   Inhale 2 inhalers Daily.   Commonly known as:  STIOLTO RESPIMAT                                            Instructions for After Discharge        Activity Instructions     Activity as Tolerated                 Other Instructions     Discharge Instructions       Do not lift greater than 10 lbs for 4 weeks, do not drive for 4 weeks or while taking narcotic pain meds, shower but do not submerge incisions until seen in office             Discharge  References/Attachments     CORONARY ARTERY DISEASE, MALE (ENGLISH)    ABDOMINAL AORTIC ANEURYSM (ENGLISH)    CORONARY ARTERY BYPASS GRAFTING (ENGLISH)    CORONARY ARTERY BYPASS GRAFTING, CARE AFTER (ENGLISH)    AORTIC VALVE REPLACEMENT (ENGLISH)    AORTIC VALVE REPLACEMENT, CARE AFTER (ENGLISH)    CARDIAC DIET (ENGLISH)    ACETAMINOPHEN TABLETS OR CAPLETS (ENGLISH)    ASPIRIN, ASA ORAL TABLETS (ENGLISH)    ATORVASTATIN TABLETS (ENGLISH)    ACETAMINOPHEN; HYDROCODONE TABLETS OR CAPSULES (ENGLISH)    TIOTROPIUM; OLODATEROL RESPIRATORY INHALATION SPRAY (ENGLISH)    TRANSESOPHAGEAL ECHOCARDIOGRAM (ENGLISH)       Follow-ups for After Discharge        Follow-up Information     Follow up with [facility] .    Specialty:  Home Health Services    Contact information:    [address]  [address]  [facility] [place] [address]  [phone]        Follow up with Jonathan García MD. Go on 2/21/2017.    Specialty:  Internal Medicine    Why:  at 1:15 pm    Contact information:    [address] [address]  [facility] [address]  [phone]          Follow up with Irena Hale MD. Schedule an appointment as soon as possible for a visit in 1 month(s).    Specialty:  Cardiology    Why:  See in 2-3 weeks    Contact information:    [address] [place] [address]  [phone]          Follow up with Jassi De Jesus MD. Schedule an appointment as soon as possible for a visit in 1 month(s).    Specialty:  Cardiothoracic Surgery    Why:  See in 4-6 weeks    Contact information:    [address]  [place] [address]  [phone]        Referrals and Follow-ups to Schedule     Follow-Up    As directed    F/u with Dr. De Jesus 4-6 weeks   Follow Up Details:  Dr. De Jesus       Referral to Home Health    As directed    Face to Face Visit Date:  1/25/2017   Follow-up Provider for Plan of Care?:  I treated the patient in an acute care facility and will not continue treatment after discharge.   Follow-up  Provider:  YAIMA DE JESUS   Reason/Clinical Findings:  postop cabg   Describe mobility limitations that make leaving home difficult:  post op cabg   Nursing/Therapeutic Services Requested:  Skilled Nursing   Skilled nursing orders:  Post CABG care   Frequency:  1 Week 1             MyChart Signup     Our records indicate that you have declined [facility] MyChart signup. If you would like to sign up for [facility], please email [email or web address] or call [phone] to obtain an activation code.         Summary of Your Hospitalization        Reason for Hospitalization     Your primary diagnosis was:  Not on File    Your diagnoses also included:  Coronary Artery Disease Involving Native Coronary Artery Of Native Heart      Care Providers     Provider Service Role Specialty    Yaima De Jesus MD -- Attending Provider Cardiothoracic Surgery    Yaima De Jesus MD -- Surgeon  Cardiothoracic Surgery    Cory Carpio MD -- Consulting Physician  Pulmonary Disease    Irena Hale MD -- Consulting Physician  Cardiology      Your Allergies  Date Reviewed: 1/19/2017    Allergen Reactions    Bisoprolol Angioedema         Clarithromycin Swelling      Patient Belongings Returned     Document Return of Belongings Flowsheet     Were the patient bedside belongings sent home?   Yes   Belongings Retrieved from Security & Sent Home   N/A    Belongings Sent to Safe   --   Medications Retrieved from Pharmacy & Sent Home   N/A              More Information      Coronary Artery Disease, Male  Coronary artery disease (CAD) is a process in which the blood vessels of the heart (coronary arteries) become narrow or blocked. The narrowing or blockage can lead to decreased blood flow to the heart muscle (angina). Prolonged reduced blood flow can cause a heart attack (myocardial infarction, MI). Because CAD is the leading cause of death in men, it is important to understand what causes this condition and how it is  treated.  CAUSES  Atherosclerosis is the cause of CAD. This is the buildup of fat and cholesterol (plaque) on the inside of the arteries. Over time, the plaque may narrow or block the artery, and this will lessen blood flow to the heart. Plaque can also become weak and break off within a coronary artery to form a clot and cause a sudden blockage.  RISK FACTORS  Many risk factors increase your chances of getting CAD, including:  · High cholesterol levels.  · High blood pressure (hypertension).  · Tobacco use.  · Diabetes.  · Age. Men over age 45 are at a greater risk of CAD.  · Gender. Men often develop CAD earlier in life than women.  · Family history of CAD.  · Obesity.  · Lack of exercise.  · A diet high in saturated fats.  SYMPTOMS   Many people do not experience any symptoms during the early stages of CAD. As the condition progresses, symptoms may include:   · Chest pain.  ¨ The pain can be described as a crushing or squeezing in the chest, or a tightness, pressure, fullness, or heaviness in the chest.  ¨ The pain can last more than a few minutes or can stop and recur.   · Pain in the arms, neck, jaw, or back.  · Unexplained heartburn or indigestion.  · Shortness of breath.  · Nausea.  · Sudden cold sweats.   Less common symptoms of CAD in men can include:  · Fatigue.  · Unexplained feelings of nervousness or anxiety.  · Weakness.  · Diarrhea.  · Sudden light-headedness.  DIAGNOSIS   Tests to diagnose CAD may include:  · ECG (electrocardiogram).  · Exercise stress test. This looks for signs of blockage when the heart is being exercised.  · Pharmacologic stress test. This test looks for signs of blockage when the heart is being stressed with a medicine.  · Blood tests.  · Coronary angiogram. This is a procedure to look at the coronary arteries to see if there is any blockage.  TREATMENT  The treatment of CAD may include the following:  · Healthy behavioral changes to reduce or control risk  factors.  · Medicine.  · Coronary stenting. A stent helps to keep an artery open.  · Coronary angioplasty. This procedure widens a narrowed or blocked artery.  · Coronary artery bypass surgery. This will allow your blood to pass the blockage (bypass) to reach your heart.  HOME CARE INSTRUCTIONS  · Take medicines only as directed by your health care provider.  · Do not take the following medicines unless your health care provider approves:    Nonsteroidal anti-inflammatory drugs (NSAIDs), such as ibuprofen, naproxen, or celecoxib.    Vitamin supplements that contain vitamin A, vitamin E, or both.  · Manage other health conditions such as hypertension and diabetes as directed by your health care provider.  · Follow a heart-healthy diet. A dietitian can help to educate you about healthy food options and changes.  · Use healthy cooking methods such as roasting, grilling, broiling, baking, poaching, steaming, or stir-frying. Talk to a dietitian to learn more about healthy cooking methods.  · Follow an exercise program approved by your health care provider.  · Maintain a healthy weight. Lose weight as approved by your health care provider.  · Plan rest periods when fatigued.  · Learn to manage stress.  · Do not use any tobacco products, including cigarettes, chewing tobacco, or electronic cigarettes. If you need help quitting, ask your health care provider.  · If you drink alcohol, and your health care provider approves, limit your alcohol intake to no more than 1 drink per day. One drink equals 12 ounces of beer, 5 ounces of wine, or 1½ ounces of hard liquor.  · Stop illegal drug use.  · Your health care provider may ask you to monitor your blood pressure. A blood pressure reading consists of a higher number over a lower number, such as 110 over 72, which is written as 110/72. Ideally, your blood pressure should be:    Below 140/90 if you have no other medical conditions.    Below 130/80 if you have diabetes or kidney  disease.  · Keep all follow-up visits as directed by your health care provider. This is important.  SEEK IMMEDIATE MEDICAL CARE IF:  · You have pain in your chest, neck, arm, jaw, stomach, or back that lasts more than a few minutes, is recurring, or is unrelieved by taking medicine under your tongue (sublingual nitroglycerin).  · You have profuse sweating without cause.  · You have unexplained:    Heartburn or indigestion.    Shortness of breath or difficulty breathing.    Nausea or vomiting.    Fatigue.    Feelings of nervousness or anxiety.    Weakness.    Diarrhea.  · You have sudden light-headedness or dizziness.  · You faint.  These symptoms may represent a serious problem that is an emergency. Do not wait to see if the symptoms will go away. Get medical help right away. Call your local emergency services (911 in the [place] Do not drive yourself to the hospital.     This information is not intended to replace advice given to you by your health care provider. Make sure you discuss any questions you have with your health care provider.     Document Released: 07/15/2015 Document Reviewed: 07/15/2015  [employer] Interactive Patient Education ©2016 [employer]          Abdominal Aortic Aneurysm  An aneurysm is a weakened or damaged part of an artery wall that bulges from the normal force of blood pumping through the body. An abdominal aortic aneurysm is an aneurysm that occurs in the lower part of the aorta, the main artery of the body.   The major concern with an abdominal aortic aneurysm is that it can enlarge and burst (rupture) or blood can flow between the layers of the wall of the aorta through a tear (aortic dissection). Both of these conditions can cause bleeding inside the body and can be life threatening unless diagnosed and treated promptly.  CAUSES   The exact cause of an abdominal aortic aneurysm is unknown. Some contributing factors are:   · A hardening of the arteries caused by the buildup of fat and  other substances in the lining of a blood vessel (arteriosclerosis).  · Inflammation of the walls of an artery (arteritis).    · Connective tissue diseases, such as Marfan syndrome.    · Abdominal trauma.    · An infection, such as syphilis or staphylococcus, in the wall of the aorta (infectious aortitis) caused by bacteria.  RISK FACTORS   Risk factors that contribute to an abdominal aortic aneurysm may include:  · Age older than 60 years.    · High blood pressure (hypertension).  · Male gender.  · Ethnicity (white race).  · Obesity.  · Family history of aneurysm (first degree relatives only).  · Tobacco use.  PREVENTION   The following healthy lifestyle habits may help decrease your risk of abdominal aortic aneurysm:  · Quitting smoking. Smoking can raise your blood pressure and cause arteriosclerosis.  · Limiting or avoiding alcohol.  · Keeping your blood pressure, blood sugar level, and cholesterol levels within normal limits.  · Decreasing your salt intake. In some people, too much salt can raise blood pressure and increase your risk of abdominal aortic aneurysm.  · Eating a diet low in saturated fats and cholesterol.  · Increasing your fiber intake by including whole grains, vegetables, and fruits in your diet. Eating these foods may help lower blood pressure.  · Maintaining a healthy weight.  · Staying physically active and exercising regularly.  SYMPTOMS   The symptoms of abdominal aortic aneurysm may vary depending on the size and rate of growth of the aneurysm. Most grow slowly and do not have any symptoms. When symptoms do occur, they may include:  · Pain (abdomen, side, lower back, or groin). The pain may vary in intensity. A sudden onset of severe pain may indicate that the aneurysm has ruptured.  · Feeling full after eating only small amounts of food.  · Nausea or vomiting or both.  · Feeling a pulsating lump in the abdomen.  · Feeling faint or passing out.  DIAGNOSIS   Since most unruptured abdominal  aortic aneurysms have no symptoms, they are often discovered during diagnostic exams for other conditions. An aneurysm may be found during the following procedures:  · Ultrasonography (A one-time screening for abdominal aortic aneurysm by ultrasonography is also recommended for all men aged 65-75 years who have ever smoked).   · X-ray exams.  · A computed tomography (CT).  · Magnetic resonance imaging (MRI).  · Angiography or arteriography.  TREATMENT   Treatment of an abdominal aortic aneurysm depends on the size of your aneurysm, your age, and risk factors for rupture. Medication to control blood pressure and pain may be used to manage aneurysms smaller than 6 cm. Regular monitoring for enlargement may be recommended by your caregiver if:  · The aneurysm is 3-4 cm in size (an annual ultrasonography may be recommended).  · The aneurysm is 4-4.5 cm in size (an ultrasonography every 6 months may be recommended).  · The aneurysm is larger than 4.5 cm in size (your caregiver may ask that you be examined by a vascular surgeon).  If your aneurysm is larger than 6 cm, surgical repair may be recommended. There are two main methods for repair of an aneurysm:   · Endovascular repair (a minimally invasive surgery). This is done most often.  · Open repair. This method is used if an endovascular repair is not possible.     This information is not intended to replace advice given to you by your health care provider. Make sure you discuss any questions you have with your health care provider.     Document Released: 09/27/2006 Document Revised: 04/14/2014 Document Reviewed: 01/17/2014  [employer] Interactive Patient Education ©2016 [employer]          Coronary Artery Bypass Grafting  Coronary artery bypass grafting (CABG) is a procedure done to bypass or fix arteries of the heart (coronary arteries) that have become narrow or blocked. This narrowing is usually the result of plaque that has built up in the walls of the vessels.  The coronary arteries supply the heart with the oxygen and nutrients it needs to pump blood to your body. In the CABG procedure, a section of blood vessel from another part of the body (usually the leg, arm, or chest wall) is removed and then inserted where it will allow blood to bypass the damaged part of the coronary artery.   LET YOUR HEALTH CARE PROVIDER KNOW ABOUT:  · Any allergies you have.    · All medicines you are taking, including blood thinners, vitamins, herbs, eye drops, creams, and over-the-counter medicines.    · Use of steroids (by mouth or creams).    · Previous problems you or members of your family have had with the use of anesthetics.    · Any blood disorders you have.    · Previous surgeries you have had.    · Medical conditions you have.    RISKS AND COMPLICATIONS  Generally, this is a safe procedure. However, problems can occur and include:   · Blood loss.    · Stroke.    · Infection.    · Pain at the surgical site.    · Heart attack during or after surgery.    · Kidney failure.    BEFORE THE PROCEDURE  · Take medicines only as directed by your health care provider. You may be asked to start new medicines and stop taking others. Do not stop medicines or adjust dosages on your own.  · Do not eat or drink anything after midnight the night before the procedure or as directed by your health care provider. Ask your health care provider if it is okay to take a sip of water with any needed medicines.  PROCEDURE  The surgeon may use either an open technique or a minimally invasive technique for this surgery.  Traditional open surgery  · You will be given medicine to make you sleep through the procedure (general anesthetic).  · Once you are asleep, a cut (incision) will be made down the front of the chest through the breastbone (sternum). The sternum will be spread open so your heart can be seen.  · You will then be placed on a heart-lung bypass machine. This machine will provide oxygen to your blood  while the heart is undergoing surgery.  · Your heart will then be temporarily stopped so that the surgeon can do the next steps.    A section of vein will likely be taken from your leg and used to bypass the blocked arteries of your heart. Sometimes parts of an artery from inside your chest wall or from your arm will be used, either alone or in combination with leg veins.    When the bypasses are done, you will be taken off the machine.    Your heart will be restarted and will take over again normally.    The sac around the heart will be closed.  · Your chest will then be closed with stitches or staples.  · Tubes will remain in your chest and will be connected to a suction device in order to help drain fluid and reinflate the lungs.  Minimally invasive surgery  This technique is done from an incision over your left chest area. If appropriate, your surgeon may not have to slow or stop your heart. If your condition allows for this procedure, there will often be less blood loss, less pain, a shorter hospital stay, and faster recovery compared to traditional open surgery.  AFTER THE PROCEDURE  · You will be taken to a recovery area to be monitored.  · You may wake up with a tube in your throat to help your breathing. You may be connected to a breathing machine. You will not be able to talk while the tube is in place. The tube will be taken out as soon as it is safe to do so.  · You will be groggy and may have some pain. You will be given pain medicine to help control the pain.     This information is not intended to replace advice given to you by your health care provider. Make sure you discuss any questions you have with your health care provider.     Document Released: 09/27/2006 Document Revised: 01/08/2016 Document Reviewed: 05/27/2014  [employer] Interactive Patient Education ©2016 [employer]          Coronary Artery Bypass Grafting, Care After  Refer to this sheet in the next few weeks. These instructions provide  you with information on caring for yourself after your procedure. Your health care provider may also give you more specific instructions. Your treatment has been planned according to current medical practices, but problems sometimes occur. Call your health care provider if you have any problems or questions after your procedure.  WHAT TO EXPECT AFTER THE PROCEDURE  Recovery from surgery will be different for everyone. Some people feel well after 3 or 4 weeks, while for others it takes longer. After your procedure, it is typical to have the following:  · Nausea and a lack of appetite.    · Constipation.  · Weakness and fatigue.    · Depression or irritability.    · Pain or discomfort at your incision site.  HOME CARE INSTRUCTIONS  · Take medicines only as directed by your health care provider. Do not stop taking medicines or start any new medicines without first checking with your health care provider.  · Take your pulse as directed by your health care provider.  · Perform deep breathing as directed by your health care provider. If you were given a device called an incentive spirometer, use it to practice deep breathing several times a day. Support your chest with a pillow or your arms when you take deep breaths or cough.  · Keep incision areas clean, dry, and protected. Remove or change any bandages (dressings) only as directed by your health care provider. You may have skin adhesive strips over the incision areas. Do not take the strips off. They will fall off on their own.  · Check incision areas daily for any swelling, redness, or drainage.  · If incisions were made in your legs, do the following:    Avoid crossing your legs.      Avoid sitting for long periods of time. Change positions every 30 minutes.      Elevate your legs when you are sitting.  · Wear compression stockings as directed by your health care provider. These stockings help keep blood clots from forming in your legs.  · Take showers once your  health care provider approves. Until then, only take sponge baths. Pat incisions dry. Do not rub incisions with a washcloth or towel. Do not take baths, swim, or use a hot tub until your health care provider approves.  · Eat foods that are high in fiber, such as raw fruits and vegetables, whole grains, beans, and nuts. Meats should be lean cut. Avoid canned, processed, and fried foods.  · Drink enough fluid to keep your urine clear or pale yellow.  · Weigh yourself every day. This helps identify if you are retaining fluid that may make your heart and lungs work harder.  · Rest and limit activity as directed by your health care provider. You may be instructed to:    Stop any activity at once if you have chest pain, shortness of breath, irregular heartbeats, or dizziness. Get help right away if you have any of these symptoms.    Move around frequently for short periods or take short walks as directed by your health care provider. Increase your activities gradually. You may need physical therapy or cardiac rehabilitation to help strengthen your muscles and build your endurance.    Avoid lifting, pushing, or pulling anything heavier than 10 lb (4.5 kg) for at least 6 weeks after surgery.  · Do not drive until your health care provider approves.   · Ask your health care provider when you may return to work.  · Ask your health care provider when you may resume sexual activity.  · Keep all follow-up visits as directed by your health care provider. This is important.  SEEK MEDICAL CARE IF:  · You have swelling, redness, increasing pain, or drainage at the site of an incision.  · You have a fever.  · You have swelling in your ankles or legs.  · You have pain in your legs.    · You gain 2 or more pounds (0.9 kg) a day.  · You are nauseous or vomit.  · You have diarrhea.   SEEK IMMEDIATE MEDICAL CARE IF:  · You have chest pain that goes to your jaw or arms.  · You have shortness of breath.    · You have a fast or irregular  "heartbeat.    · You notice a \"clicking\" in your breastbone (sternum) when you move.    · You have numbness or weakness in your arms or legs.  · You feel dizzy or light-headed.    MAKE SURE YOU:  · Understand these instructions.  · Will watch your condition.  · Will get help right away if you are not doing well or get worse.     This information is not intended to replace advice given to you by your health care provider. Make sure you discuss any questions you have with your health care provider.     Document Released: 07/07/2006 Document Revised: 01/08/2016 Document Reviewed: 05/27/2014  [employer] Interactive Patient Education ©2016 [employer]          Aortic Valve Replacement   Aortic valve replacement is a procedure to replace an aortic valve that cannot be repaired. An artificial (prosthetic) valve is used to do this. Three types of prosthetic valves are available:   · Mechanical valves made entirely from man-made materials.    · Donor valves made from human donors. These are only used in special situations.    · Biological valves made from animal tissues.    The type of prosthetic valve used will be determined based on various factors, including your age, your lifestyle, and other medical conditions you have.   LET YOUR HEALTH CARE PROVIDER KNOW ABOUT:  · Any allergies you have.  · All medicines you are taking, including vitamins, herbs, eye drops, creams, and over-the-counter medicines.  · Previous problems you or members of your family have had with the use of anesthetics.  · Any blood disorders you have.  · Previous surgeries you have had.  · Medical conditions you have.  RISKS AND COMPLICATIONS  Generally, this is a safe procedure. However, as with any procedure, problems can occur. Possible problems include:   · Blood clotting caused by the new valve. Replacement with a mechanical valve requires lifelong treatment with medicine to prevent blood clots.    · Infection in the new valve.    · Valve failure. "    · Bleeding.    · Reaction to anesthetics.    BEFORE THE PROCEDURE  · Ask your health care provider about:    Changing or stopping your regular medicines. This is especially important if you are taking diabetes medicines or blood thinners.    Taking medicines such as aspirin and ibuprofen. These medicines can thin your blood. Do not take these medicines before your procedure if your health care provider asks you not to.  · Do not eat or drink anything after midnight on the night before the procedure or as directed by your health care provider.  PROCEDURE   The surgeon may use either an open technique or a minimally invasive technique for this surgery:   Traditional open surgery  · You will be given a medicine that makes you fall asleep (general anesthetic).  · You will then be placed on a heart-lung bypass machine. This machine provides oxygen to your blood while the heart is undergoing surgery.  · During surgery, the surgeon will make a large cut (incision) in the chest.  · The heart will be cooled to slow or stop the heartbeat.  · The damaged aortic valve will be removed and replaced with a prosthetic heart valve.  · The incision will then be closed with staples or stitches.  Minimally invasive surgery  This is done through a smaller incision. This still requires general anesthetic and the heart-lung bypass machine. Your heart will be cooled to slow or stop the heartbeat, allowing the damaged valve to be removed and replaced with the new valve. The smaller incision will then be closed. If your condition allows for this procedure, there is often less blood loss, less pain, and faster recovery compared to traditional open surgery.   AFTER THE PROCEDURE  You will be monitored closely in a recovery area. From there, you will likely go to an intensive care unit.       This information is not intended to replace advice given to you by your health care provider. Make sure you discuss any questions you have with your  health care provider.     Document Released: 05/09/2006 Document Revised: 01/08/2016 Document Reviewed: 10/01/2013  [employer] Interactive Patient Education ©2016 [employer]          Aortic Valve Replacement, Care After  Refer to this sheet in the next few weeks. These instructions provide you with information on caring for yourself after your procedure. Your health care provider may also give you specific instructions. Your treatment has been planned according to current medical practices, but problems sometimes occur. Call your health care provider if you have any problems or questions after your procedure.  HOME CARE INSTRUCTIONS   · Take medicines only as directed by your health care provider.  · If your health care provider has prescribed elastic stockings, wear them as directed.  · Take frequent naps or rest often throughout the day.  · Avoid lifting over 10 lbs (4.5 kg) or pushing or pulling things with your arms for 6-8 weeks or as directed by your health care provider.  · Avoid driving or airplane travel for 4-6 weeks after surgery or as directed by your health care provider. If you are riding in a car for an extended period, stop every 1-2 hours to stretch your legs. Keep a record of your medicines and medical history with you when traveling.  · Do not drive or operate heavy machinery while taking pain medicine. (narcotics).  · Do not cross your legs.  · Do not use any tobacco products including cigarettes, chewing tobacco, or electronic cigarettes. If you need help quitting, ask your health care provider.  · Do not take baths, swim, or use a hot tub until your health care provider approves. Take showers once your health care provider approves. Pat incisions dry. Do not rub incisions with a washcloth or towel.  · Avoid climbing stairs and using the handrail to pull yourself up for the first 2-3 weeks after surgery.  · Return to work as directed by your health care provider.  · Drink enough fluid to keep  your urine clear or pale yellow.  · Do not strain to have a bowel movement. Eat high-fiber foods if you become constipated. You may also take a medicine to help you have a bowel movement (laxative) as directed by your health care provider.  · Resume sexual activity as directed by your health care provider. Men should not use medicines for erectile dysfunction until their doctor says it is okay.  · If you had a certain type of heart condition in the past, you may need to take antibiotic medicine before having dental work or surgery. Let your dentist and health care providers know if you had one or more of the following:    Previous endocarditis.    An artificial (prosthetic) heart valve.    Congenital heart disease.  SEEK MEDICAL CARE IF:  · You develop a skin rash.    · You experience sudden changes in your weight.  · You have a fever.  SEEK IMMEDIATE MEDICAL CARE IF:   · You develop chest pain that is not coming from your incision.  · You have drainage (pus), redness, swelling, or pain at your incision site.    · You develop shortness of breath or have difficulty breathing.    · You have increased bleeding from your incision site.    · You develop light-headedness.    MAKE SURE YOU:   · Understand these directions.  · Will watch your condition.  · Will get help right away if you are not doing well or get worse.     This information is not intended to replace advice given to you by your health care provider. Make sure you discuss any questions you have with your health care provider.     Document Released: 07/06/2006 Document Revised: 01/08/2016 Document Reviewed: 10/01/2013  [employer] Interactive Patient Education ©2016 [employer]          Heart-Healthy Eating Plan  Many factors influence your heart health, including eating and exercise habits. Heart (coronary) risk increases with abnormal blood fat (lipid) levels. Heart-healthy meal planning includes limiting unhealthy fats, increasing healthy fats, and making  "other small dietary changes. This includes maintaining a healthy body weight to help keep lipid levels within a normal range.  WHAT IS MY PLAN?   Your health care provider recommends that you:  · Get no more than _________% of the total calories in your daily diet from fat.  · Limit your intake of saturated fat to less than _________% of your total calories each day.  · Limit the amount of cholesterol in your diet to less than _________ mg per day.  WHAT TYPES OF FAT SHOULD I CHOOSE?  · Choose healthy fats more often. Choose monounsaturated and polyunsaturated fats, such as olive oil and canola oil, flaxseeds, walnuts, almonds, and seeds.  · Eat more omega-3 fats. Good choices include salmon, mackerel, sardines, tuna, flaxseed oil, and ground flaxseeds. Aim to eat fish at least two times each week.  · Limit saturated fats. Saturated fats are primarily found in animal products, such as meats, butter, and cream. Plant sources of saturated fats include palm oil, palm kernel oil, and coconut oil.  · Avoid foods with partially hydrogenated oils in them. These contain trans fats. Examples of foods that contain trans fats are stick margarine, some tub margarines, cookies, crackers, and other baked goods.  WHAT GENERAL GUIDELINES DO I NEED TO FOLLOW?  · Check food labels carefully to identify foods with trans fats or high amounts of saturated fat.  · Fill one half of your plate with vegetables and green salads. Eat 4-5 servings of vegetables per day. A serving of vegetables equals 1 cup of raw leafy vegetables, ½ cup of raw or cooked cut-up vegetables, or ½ cup of vegetable juice.  · Fill one fourth of your plate with whole grains. Look for the word \"whole\" as the first word in the ingredient list.  · Fill one fourth of your plate with lean protein foods.  · Eat 4-5 servings of fruit per day. A serving of fruit equals one medium whole fruit, ¼ cup of dried fruit, ½ cup of fresh, frozen, or canned fruit, or ½ cup of 100% " fruit juice.  · Eat more foods that contain soluble fiber. Examples of foods that contain this type of fiber are apples, broccoli, carrots, beans, peas, and barley. Aim to get 20-30 g of fiber per day.  · Eat more home-cooked food and less restaurant, buffet, and fast food.  · Limit or avoid alcohol.  · Limit foods that are high in starch and sugar.  · Avoid fried foods.  · Cook foods by using methods other than frying. Baking, boiling, grilling, and broiling are all great options. Other fat-reducing suggestions include:    Removing the skin from poultry.    Removing all visible fats from meats.    Skimming the fat off of stews, soups, and gravies before serving them.    Steaming vegetables in water or broth.  · Lose weight if you are overweight. Losing just 5-10% of your initial body weight can help your overall health and prevent diseases such as diabetes and heart disease.  · Increase your consumption of nuts, legumes, and seeds to 4-5 servings per week. One serving of dried beans or legumes equals ½ cup after being cooked, one serving of nuts equals 1½ ounces, and one serving of seeds equals ½ ounce or 1 tablespoon.  · You may need to monitor your salt (sodium) intake, especially if you have high blood pressure. Talk with your health care provider or dietitian to get more information about reducing sodium.  WHAT FOODS CAN I EAT?  Grains  Breads, including Scottish, white, benito, wheat, raisin, rye, oatmeal, and Italian. Tortillas that are neither fried nor made with lard or trans fat. Low-fat rolls, including hotdog and hamburger buns and English muffins. Biscuits. Muffins. Waffles. Pancakes. Light popcorn. Whole-grain cereals. Flatbread. [place] toast. Pretzels. Breadsticks. Rusks. Low-fat snacks and crackers, including oyster, saltine, matzo, keaton, animal, and rye. Rice and pasta, including brown rice and those that are made with whole wheat.  Vegetables  All vegetables.  Fruits  All fruits, but limit  coconut.  Meats and Other Protein Sources  Lean, well-trimmed beef, veal, pork, and lamb. Chicken and turkey without skin. All fish and shellfish. Wild duck, rabbit, pheasant, and venison. Egg whites or low-cholesterol egg substitutes. Dried beans, peas, lentils, and tofu. Seeds and most nuts.  Dairy  Low-fat or nonfat cheeses, including ricotta, string, and mozzarella. Skim or 1% milk that is liquid, powdered, or evaporated. Buttermilk that is made with low-fat milk. Nonfat or low-fat yogurt.  Beverages  Mineral water. Diet carbonated beverages.  Sweets and Desserts  Sherbets and fruit ices. Honey, jam, marmalade, jelly, and syrups. Meringues and gelatins. Pure sugar candy, such as hard candy, jelly beans, gumdrops, mints, marshmallows, and small amounts of dark chocolate. Armin food cake.  Eat all sweets and desserts in moderation.  Fats and Oils  Nonhydrogenated (trans-free) margarines. Vegetable oils, including soybean, sesame, sunflower, olive, peanut, safflower, corn, canola, and cottonseed. Salad dressings or mayonnaise that are made with a vegetable oil. Limit added fats and oils that you use for cooking, baking, salads, and as spreads.  Other  Cocoa powder. Coffee and tea. All seasonings and condiments.  The items listed above may not be a complete list of recommended foods or beverages. Contact your dietitian for more options.  WHAT FOODS ARE NOT RECOMMENDED?  Grains  Breads that are made with saturated or trans fats, oils, or whole milk. Croissants. Butter rolls. Cheese breads. Sweet rolls. Donuts. Buttered popcorn. Chow mein noodles. High-fat crackers, such as cheese or butter crackers.  Meats and Other Protein Sources  Fatty meats, such as hotdogs, short ribs, sausage, spareribs, mathur, ribeye roast or steak, and mutton. High-fat deli meats, such as salami and bologna. Caviar. Domestic duck and goose. Organ meats, such as kidney, liver, sweetbreads, brains, gizzard, chitterlings, and  heart.  Dairy  Cream, sour cream, cream cheese, and creamed cottage cheese. Whole milk cheeses, including blue (segundo), Graham Jaswant, Brie, Dl, American, Havarti, Swiss, cheddar, Camembert, and [place].  Whole or 2% milk that is liquid, evaporated, or condensed. Whole buttermilk. Cream sauce or high-fat cheese sauce. Yogurt that is made from whole milk.  Beverages  Regular sodas and drinks with added sugar.  Sweets and Desserts  Frosting. Pudding. Cookies. Cakes other than ann food cake. Candy that has milk chocolate or white chocolate, hydrogenated fat, butter, coconut, or unknown ingredients. Buttered syrups. Full-fat ice cream or ice cream drinks.  Fats and Oils  Gravy that has suet, meat fat, or shortening. Cocoa butter, hydrogenated oils, palm oil, coconut oil, palm kernel oil. These can often be found in baked products, candy, fried foods, nondairy creamers, and whipped toppings. Solid fats and shortenings, including mathur fat, salt pork, lard, and butter. Nondairy cream substitutes, such as coffee creamers and sour cream substitutes. Salad dressings that are made of unknown oils, cheese, or sour cream.  The items listed above may not be a complete list of foods and beverages to avoid. Contact your dietitian for more information.     This information is not intended to replace advice given to you by your health care provider. Make sure you discuss any questions you have with your health care provider.     Document Released: 09/26/2009 Document Revised: 01/08/2016 Document Reviewed: 06/11/2015  [employer] Interactive Patient Education ©2016 [employer]          Acetaminophen tablets or caplets  What is this medicine?  ACETAMINOPHEN (a set a ADAN laurie fen) is a pain reliever. It is used to treat mild pain and fever.  This medicine may be used for other purposes; ask your health care provider or pharmacist if you have questions.  What should I tell my health care provider before I take this medicine?  They  need to know if you have any of these conditions:  -if you often drink alcohol  -liver disease  -an unusual or allergic reaction to acetaminophen, other medicines, foods, dyes, or preservatives  -pregnant or trying to get pregnant  -breast-feeding  How should I use this medicine?  Take this medicine by mouth with a glass of water. Follow the directions on the package or prescription label. Take your medicine at regular intervals. Do not take your medicine more often than directed.  Talk to your pediatrician regarding the use of this medicine in children. While this drug may be prescribed for children as young as 6 years of age for selected conditions, precautions do apply.  Overdosage: If you think you have taken too much of this medicine contact a poison control center or emergency room at once.  NOTE: This medicine is only for you. Do not share this medicine with others.  What if I miss a dose?  If you miss a dose, take it as soon as you can. If it is almost time for your next dose, take only that dose. Do not take double or extra doses.  What may interact with this medicine?  -alcohol  -imatinib  -isoniazid  -other medicines with acetaminophen  This list may not describe all possible interactions. Give your health care provider a list of all the medicines, herbs, non-prescription drugs, or dietary supplements you use. Also tell them if you smoke, drink alcohol, or use illegal drugs. Some items may interact with your medicine.  What should I watch for while using this medicine?  Tell your doctor or health care professional if the pain lasts more than 10 days (5 days for children), if it gets worse, or if there is a new or different kind of pain. Also, check with your doctor if a fever lasts for more than 3 days.  Do not take other medicines that contain acetaminophen with this medicine. Always read labels carefully. If you have questions, ask your doctor or pharmacist.  If you take too much acetaminophen get  medical help right away. Too much acetaminophen can be very dangerous and cause liver damage. Even if you do not have symptoms, it is important to get help right away.  What side effects may I notice from receiving this medicine?  Side effects that you should report to your doctor or health care professional as soon as possible:  -allergic reactions like skin rash, itching or hives, swelling of the face, lips, or tongue  -breathing problems  -fever or sore throat  -redness, blistering, peeling or loosening of the skin, including inside the mouth  -trouble passing urine or change in the amount of urine  -unusual bleeding or bruising  -unusually weak or tired  -yellowing of the eyes or skin  Side effects that usually do not require medical attention (report to your doctor or health care professional if they continue or are bothersome):  -headache  -nausea, stomach upset  This list may not describe all possible side effects. Call your doctor for medical advice about side effects. You may report side effects to FDA at 4-998-FDA-3373.  Where should I keep my medicine?  Keep out of reach of children.  Store at room temperature between 20 and 25 degrees C (68 and 77 degrees F). Protect from moisture and heat. Throw away any unused medicine after the expiration date.  NOTE: This sheet is a summary. It may not cover all possible information. If you have questions about this medicine, talk to your doctor, pharmacist, or health care provider.     © 2016, [employer]. (2014-08-11 12:54:16)          Aspirin, ASA oral tablets  What is this medicine?  ASPIRIN (AS pir in) is a pain reliever. It is used to treat mild pain and fever. This medicine is also used as directed by a doctor to prevent and to treat heart attacks, to prevent strokes, and to treat arthritis or inflammation.  This medicine may be used for other purposes; ask your health care provider or pharmacist if you have questions.  What should I tell my health  care provider before I take this medicine?  They need to know if you have any of these conditions:  -anemia  -asthma  -bleeding problems  -child with chickenpox, the flu, or other viral infection  -diabetes  -gout  -if you frequently drink alcohol containing drinks  -kidney disease  -liver disease  -low level of vitamin K  -lupus  -smoke tobacco  -stomach ulcers or other problems  -an unusual or allergic reaction to aspirin, tartrazine dye, other medicines, dyes, or preservatives  -pregnant or trying to get pregnant  -breast-feeding  How should I use this medicine?  Take this medicine by mouth with a glass of water. Follow the directions on the package or prescription label. You can take this medicine with or without food. If it upsets your stomach, take it with food. Do not take your medicine more often than directed.  Talk to your pediatrician regarding the use of this medicine in children. While this drug may be prescribed for children as young as 12 years of age for selected conditions, precautions do apply. Children and teenagers should not use this medicine to treat chicken pox or flu symptoms unless directed by a doctor.  Patients over 65 years old may have a stronger reaction and need a smaller dose.  Overdosage: If you think you have taken too much of this medicine contact a poison control center or emergency room at once.  NOTE: This medicine is only for you. Do not share this medicine with others.  What if I miss a dose?  If you are taking this medicine on a regular schedule and miss a dose, take it as soon as you can. If it is almost time for your next dose, take only that dose. Do not take double or extra doses.  What may interact with this medicine?  Do not take this medicine with any of the following medications:  -cidofovir  -ketorolac  -probenecid  This medicine may also interact with the following medications:  -alcohol  -alendronate  -bismuth subsalicylate  -flavocoxid  -herbal supplements like  feverfew, garlic, lulu, ginkgo biloba, horse chestnut  -medicines for diabetes or glaucoma like acetazolamide, methazolamide  -medicines for gout  -medicines that treat or prevent blood clots like enoxaparin, heparin, ticlopidine, warfarin  -other aspirin and aspirin-like medicines  -NSAIDs, medicines for pain and inflammation, like ibuprofen or naproxen  -pemetrexed  -sulfinpyrazone  -varicella live vaccine  This list may not describe all possible interactions. Give your health care provider a list of all the medicines, herbs, non-prescription drugs, or dietary supplements you use. Also tell them if you smoke, drink alcohol, or use illegal drugs. Some items may interact with your medicine.  What should I watch for while using this medicine?  If you are treating yourself for pain, tell your doctor or health care professional if the pain lasts more than 10 days, if it gets worse, or if there is a new or different kind of pain. Tell your doctor if you see redness or swelling. Also, check with your doctor if you have a fever that lasts for more than 3 days. Only take this medicine to prevent heart attacks or blood clotting if prescribed by your doctor or health care professional.  Do not take aspirin or aspirin-like medicines with this medicine. Too much aspirin can be dangerous. Always read the labels carefully.  This medicine can irritate your stomach or cause bleeding problems. Do not smoke cigarettes or drink alcohol while taking this medicine. Do not lie down for 30 minutes after taking this medicine to prevent irritation to your throat.  If you are scheduled for any medical or dental procedure, tell your healthcare provider that you are taking this medicine. You may need to stop taking this medicine before the procedure.  This medicine may be used to treat migraines. If you take migraine medicines for 10 or more days a month, your migraines may get worse. Keep a diary of headache days and medicine use. Contact  your healthcare professional if your migraine attacks occur more frequently.  What side effects may I notice from receiving this medicine?  Side effects that you should report to your doctor or health care professional as soon as possible:  -allergic reactions like skin rash, itching or hives, swelling of the face, lips, or tongue  -breathing problems  -changes in hearing, ringing in the ears  -confusion  -general ill feeling or flu-like symptoms  -pain on swallowing  -redness, blistering, peeling or loosening of the skin, including inside the mouth or nose  -signs and symptoms of bleeding such as bloody or black, tarry stools; red or dark-brown urine; spitting up blood or brown material that looks like coffee grounds; red spots on the skin; unusual bruising or bleeding from the eye, gums, or nose  -trouble passing urine or change in the amount of urine  -unusually weak or tired  -yellowing of the eyes or skin  Side effects that usually do not require medical attention (report to your doctor or health care professional if they continue or are bothersome):  -diarrhea or constipation  -headache  -nausea, vomiting  -stomach gas, heartburn  This list may not describe all possible side effects. Call your doctor for medical advice about side effects. You may report side effects to FDA at 7-271-FDA-9895.  Where should I keep my medicine?  Keep out of the reach of children.  Store at room temperature between 15 and 30 degrees C (59 and 86 degrees F). Protect from heat and moisture. Do not use this medicine if it has a strong vinegar smell. Throw away any unused medicine after the expiration date.  NOTE: This sheet is a summary. It may not cover all possible information. If you have questions about this medicine, talk to your doctor, pharmacist, or health care provider.     © 2016, [employer]. (2014-08-19 11:30:31)          Atorvastatin tablets  What is this medicine?  ATORVASTATIN (a TORE va sta tin) is known as  a HMG-CoA reductase inhibitor or 'statin'. It lowers the level of cholesterol and triglycerides in the blood. This drug may also reduce the risk of heart attack, stroke, or other health problems in patients with risk factors for heart disease. Diet and lifestyle changes are often used with this drug.  This medicine may be used for other purposes; ask your health care provider or pharmacist if you have questions.  What should I tell my health care provider before I take this medicine?  They need to know if you have any of these conditions:  -frequently drink alcoholic beverages  -history of stroke, TIA  -kidney disease  -liver disease  -muscle aches or weakness  -other medical condition  -an unusual or allergic reaction to atorvastatin, other medicines, foods, dyes, or preservatives  -pregnant or trying to get pregnant  -breast-feeding  How should I use this medicine?  Take this medicine by mouth with a glass of water. Follow the directions on the prescription label. You can take this medicine with or without food. Take your doses at regular intervals. Do not take your medicine more often than directed.  Talk to your pediatrician regarding the use of this medicine in children. While this drug may be prescribed for children as young as 10 years old for selected conditions, precautions do apply.  Overdosage: If you think you have taken too much of this medicine contact a poison control center or emergency room at once.  NOTE: This medicine is only for you. Do not share this medicine with others.  What if I miss a dose?  If you miss a dose, take it as soon as you can. If it is almost time for your next dose, take only that dose. Do not take double or extra doses.  What may interact with this medicine?  Do not take this medicine with any of the following medications:  -red yeast rice  -telaprevir  -telithromycin  -voriconazole  This medicine may also interact with the following medications:  -alcohol  -antiviral  medicines for HIV or AIDS  -boceprevir  -certain antibiotics like clarithromycin, erythromycin, troleandomycin  -certain medicines for cholesterol like fenofibrate or gemfibrozil  -cimetidine  -clarithromycin  -colchicine  -cyclosporine  -digoxin  -female hormones, like estrogens or progestins and birth control pills  -grapefruit juice  -medicines for fungal infections like fluconazole, itraconazole, ketoconazole  -niacin  -rifampin  -spironolactone  This list may not describe all possible interactions. Give your health care provider a list of all the medicines, herbs, non-prescription drugs, or dietary supplements you use. Also tell them if you smoke, drink alcohol, or use illegal drugs. Some items may interact with your medicine.  What should I watch for while using this medicine?  Visit your doctor or health care professional for regular check-ups. You may need regular tests to make sure your liver is working properly.  Tell your doctor or health care professional right away if you get any unexplained muscle pain, tenderness, or weakness, especially if you also have a fever and tiredness. Your doctor or health care professional may tell you to stop taking this medicine if you develop muscle problems. If your muscle problems do not go away after stopping this medicine, contact your health care professional.  This drug is only part of a total heart-health program. Your doctor or a dietician can suggest a low-cholesterol and low-fat diet to help. Avoid alcohol and smoking, and keep a proper exercise schedule.  Do not use this drug if you are pregnant or breast-feeding. Serious side effects to an unborn child or to an infant are possible. Talk to your doctor or pharmacist for more information.  This medicine may affect blood sugar levels. If you have diabetes, check with your doctor or health care professional before you change your diet or the dose of your diabetic medicine.  If you are going to have surgery tell  your health care professional that you are taking this drug.  What side effects may I notice from receiving this medicine?  Side effects that you should report to your doctor or health care professional as soon as possible:  -allergic reactions like skin rash, itching or hives, swelling of the face, lips, or tongue  -dark urine  -fever  -joint pain  -muscle cramps, pain  -redness, blistering, peeling or loosening of the skin, including inside the mouth  -trouble passing urine or change in the amount of urine  -unusually weak or tired  -yellowing of eyes or skin  Side effects that usually do not require medical attention (report to your doctor or health care professional if they continue or are bothersome):  -constipation  -heartburn  -stomach gas, pain, upset  This list may not describe all possible side effects. Call your doctor for medical advice about side effects. You may report side effects to FDA at 8-490-FDA-7869.  Where should I keep my medicine?  Keep out of the reach of children.  Store at room temperature between 20 to 25 degrees C (68 to 77 degrees F). Throw away any unused medicine after the expiration date.  NOTE: This sheet is a summary. It may not cover all possible information. If you have questions about this medicine, talk to your doctor, pharmacist, or health care provider.     © 2016, [employer]. (2012-11-06 09:18:24)          Acetaminophen; Hydrocodone tablets or capsules  What is this medicine?  ACETAMINOPHEN; HYDROCODONE (a set a ADAN laurie fen; ritu droe KOE done) is a pain reliever. It is used to treat moderate to severe pain.  This medicine may be used for other purposes; ask your health care provider or pharmacist if you have questions.  What should I tell my health care provider before I take this medicine?  They need to know if you have any of these conditions:  -brain tumor  -Crohn's disease, inflammatory bowel disease, or ulcerative colitis  -drug abuse or addiction  -head  injury  -heart or circulation problems  -if you often drink alcohol  -kidney disease or problems going to the bathroom  -liver disease  -lung disease, asthma, or breathing problems  -an unusual or allergic reaction to acetaminophen, hydrocodone, other opioid analgesics, other medicines, foods, dyes, or preservatives  -pregnant or trying to get pregnant  -breast-feeding  How should I use this medicine?  Take this medicine by mouth. Swallow it with a full glass of water. Follow the directions on the prescription label. If the medicine upsets your stomach, take the medicine with food or milk. Do not take more than you are told to take.  Talk to your pediatrician regarding the use of this medicine in children. This medicine is not approved for use in children.  Patients over 65 years may have a stronger reaction and need a smaller dose.  Overdosage: If you think you have taken too much of this medicine contact a poison control center or emergency room at once.  NOTE: This medicine is only for you. Do not share this medicine with others.  What if I miss a dose?  If you miss a dose, take it as soon as you can. If it is almost time for your next dose, take only that dose. Do not take double or extra doses.  What may interact with this medicine?  -alcohol  -antihistamines  -isoniazid  -medicines for depression, anxiety, or psychotic disturbances  -medicines for sleep  -muscle relaxants  -naltrexone  -narcotic medicines (opiates) for pain  -phenobarbital  -ritonavir  -tramadol  This list may not describe all possible interactions. Give your health care provider a list of all the medicines, herbs, non-prescription drugs, or dietary supplements you use. Also tell them if you smoke, drink alcohol, or use illegal drugs. Some items may interact with your medicine.  What should I watch for while using this medicine?  Tell your doctor or health care professional if your pain does not go away, if it gets worse, or if you have new or  a different type of pain. You may develop tolerance to the medicine. Tolerance means that you will need a higher dose of the medicine for pain relief. Tolerance is normal and is expected if you take the medicine for a long time.  Do not suddenly stop taking your medicine because you may develop a severe reaction. Your body becomes used to the medicine. This does NOT mean you are addicted. Addiction is a behavior related to getting and using a drug for a non-medical reason. If you have pain, you have a medical reason to take pain medicine. Your doctor will tell you how much medicine to take. If your doctor wants you to stop the medicine, the dose will be slowly lowered over time to avoid any side effects.  You may get drowsy or dizzy when you first start taking the medicine or change doses. Do not drive, use machinery, or do anything that may be dangerous until you know how the medicine affects you. Stand or sit up slowly.  There are different types of narcotic medicines (opiates) for pain. If you take more than one type at the same time, you may have more side effects. Give your health care provider a list of all medicines you use. Your doctor will tell you how much medicine to take. Do not take more medicine than directed. Call emergency for help if you have problems breathing.  The medicine will cause constipation. Try to have a bowel movement at least every 2 to 3 days. If you do not have a bowel movement for 3 days, call your doctor or health care professional.  Too much acetaminophen can be very dangerous. Do not take Tylenol (acetaminophen) or medicines that contain acetaminophen with this medicine. Many non-prescription medicines contain acetaminophen. Always read the labels carefully.  What side effects may I notice from receiving this medicine?  Side effects that you should report to your doctor or health care professional as soon as possible:  -allergic reactions like skin rash, itching or hives, swelling  of the face, lips, or tongue  -breathing problems  -confusion  -feeling faint or lightheaded, falls  -stomach pain  -yellowing of the eyes or skin  Side effects that usually do not require medical attention (report to your doctor or health care professional if they continue or are bothersome):  -nausea, vomiting  -stomach upset  This list may not describe all possible side effects. Call your doctor for medical advice about side effects. You may report side effects to FDA at 8-164-FDA-1853.  Where should I keep my medicine?  Keep out of the reach of children. This medicine can be abused. Keep your medicine in a safe place to protect it from theft. Do not share this medicine with anyone. Selling or giving away this medicine is dangerous and against the law.  This medicine may cause accidental overdose and death if it taken by other adults, children, or pets. Mix any unused medicine with a substance like cat litter or coffee grounds. Then throw the medicine away in a sealed container like a sealed bag or a coffee can with a lid. Do not use the medicine after the expiration date.  Store at room temperature between 15 and 30 degrees C (59 and 86 degrees F).  NOTE: This sheet is a summary. It may not cover all possible information. If you have questions about this medicine, talk to your doctor, pharmacist, or health care provider.     © 2016, [employer]. (2015-11-18 15:29:20)          Tiotropium; Olodaterol respiratory inhalation spray  What is this medicine?  TIOTROPIUM; OLODATEROL (que oh TRO pee um; OH suze DA ter ol) inhalation is a combination of two medicines that help to open up the airways of your lungs. It is for chronic obstructive pulmonary disease (COPD), including chronic bronchitis or emphysema. Do NOT use for asthma or an acute asthma attack. Do NOT use for a COPD attack.  This medicine may be used for other purposes; ask your health care provider or pharmacist if you have questions.  What should  I tell my health care provider before I take this medicine?  They need to know if you have any of these conditions:  -asthma  -bladder problems or difficulty passing urine  -diabetes  -glaucoma  -heart disease or irregular heartbeat  -high blood pressure  -kidney disease  -pheochromocytoma  -prostate disease  -seizures  -thyroid disease  -an unusual or allergic reaction to tiotropium, olodaterol, ipratropium, atropine, other medicines, foods, dyes, or preservatives  -pregnant or trying to get pregnant  -breast-feeding  How should I use this medicine?  This medicine is inhaled through the mouth. It is used once per day. Follow the directions on the prescription label. Take your medicine at regular intervals. Do not take your medicine more often than directed. Do not stop taking except on your doctor's advice. Make sure that you are using your inhaler correctly. Ask you doctor or health care provider if you have any questions.  A special MedGuide will be given to you by the pharmacist with each prescription and refill. Be sure to read this information carefully each time.  Talk to your pediatrician regarding the use of this medicine in children. Special care may be needed.  Overdosage: If you think you have taken too much of this medicine contact a poison control center or emergency room at once.  NOTE: This medicine is only for you. Do not share this medicine with others.  What if I miss a dose?  If you miss a dose, use it as soon as you can. If it is almost time for your next dose, use only that dose and continue with your regular schedule. Do not use double or extra doses.  What may interact with this medicine?  Do not take this medicine with any of the following medications:  -MAOIs like Carbex, Eldepryl, Marplan, Nardil, and Parnate  This medicine may also interact with the following medications:  -atropine  -certain medicines for allergy, cough and cold  -certain medicines for bladder problems like oxybutynin,  tolterodine  -certain medicines for stomach problems like dicyclomine, hyoscyamine  -certain medicines for travel sickness like scopolamine  -certain medicines for Parkinson's disease like benztropine, trihexyphenidyl  -diuretics  -ipratropium  -medicines for depression, anxiety, or psychotic disturbances  -medicines for fungal infections like ketoconazole  -medicines for weight loss including some herbal products  -other medicines that prolong the QT interval (cause an abnormal heart rhythm)  -procarbazine  -ritonavir  -some antibiotics like clarithromycin, erythromycin, levofloxacin, linezolid, and telithromycin  -some heart medicines  -steroid medicines like prednisone or cortisone  -stimulant medicines for attention disorders, weight loss, or to stay awake  -theophylline  -thyroid hormones  This list may not describe all possible interactions. Give your health care provider a list of all the medicines, herbs, non-prescription drugs, or dietary supplements you use. Also tell them if you smoke, drink alcohol, or use illegal drugs. Some items may interact with your medicine.  What should I watch for while using this medicine?  Visit your doctor or health care professional for regular checkups. Tell your doctor or health care professional if your symptoms do not get better.  If your symptoms get worse or if you need your short-acting inhalers more often, call your doctor right away. Do not use this medicine more than once every 24 hours.  Do not get the this medicine in your eyes. It can cause irritation, pain, or blurred vision.  You may get dizzy. Do not drive, use machinery, or do anything that needs mental alertness until you know how this medicine affects you. Do not stand or sit up quickly, especially if you are an older patient. This reduces the risk of dizzy or fainting spells.  Clean the inhaler as directed in the patient information sheet that comes with this medicine.  What side effects may I notice from  receiving this medicine?  Side effects that you should report to your doctor or health care professional as soon as possible:  -allergic reactions like skin rash or hives, swelling of the face, lips, or tongue  -breathing problems right after inhaling your medicine  -changes in vision  -chest pain  -difficulty breathing or wheezing that increases or does not go away  -fast, irregular heartbeat  -feeling faint or lightheaded, falls  -general ill feeling or flu-like symptoms  -stomach pain  -trouble passing urine or change in the amount of urine  -unusually weak or tired  Side effects that usually do not require medical attention (Report these to your doctor or health care professional if they continue or are bothersome.):  -back pain  -constipation  -cough  -dry mouth  -nervousness  -runny nose  -sore throat  -tremor  This list may not describe all possible side effects. Call your doctor for medical advice about side effects. You may report side effects to FDA at 1-115-FDA-1554.  Where should I keep my medicine?  Keep out of the reach of children.  Store at room temperature between 15 and 30 degrees C (59 and 86 degrees F). Do not freeze. The inhaler should be discarded after 3 months or when the inhaler becomes locked, whichever comes first. Throw away any unopened packages after the expiration date.  NOTE: This sheet is a summary. It may not cover all possible information. If you have questions about this medicine, talk to your doctor, pharmacist, or health care provider.     © 2016, [employer]. (2015-06-01 15:00:47)          Transesophageal Echocardiogram  Transesophageal echocardiography (THIAGO) is a special type of test that produces images of the heart by using sound waves (echocardiogram). This type of echocardiography can obtain better images of the heart than standard echocardiography. THIAGO is done by passing a flexible tube down the esophagus. The heart is located in front of the esophagus. Because  the heart and esophagus are close to one another, your health care provider can take very clear, detailed pictures of the heart via ultrasound waves.  THIAGO may be done:  · If your health care provider needs more information based on standard echocardiography findings.  · If you had a stroke. This might have happened because a clot formed in your heart. THIAGO can visualize different areas of the heart and check for clots.  · To check valve anatomy and function.  · To check for infection on the inside of your heart (endocarditis).  · To evaluate the dividing wall (septum) of the heart and presence of a hole that did not close after birth (patent foramen ovale or atrial septal defect).  · To help diagnose a tear in the wall of the aorta (aortic dissection).  · During cardiac valve surgery. This allows the surgeon to assess the valve repair before closing the chest.  · During a variety of other cardiac procedures to guide positioning of catheters.  · Sometimes before a cardioversion, which is a shock to convert heart rhythm back to normal.  LET YOUR HEALTH CARE PROVIDER KNOW ABOUT:   · Any allergies you have.  · All medicines you are taking, including vitamins, herbs, eye drops, creams, and over-the-counter medicines.  · Previous problems you or members of your family have had with the use of anesthetics.  · Any blood disorders you have.  · Previous surgeries you have had.  · Medical conditions you have.  · Swallowing difficulties.  · An esophageal obstruction.  RISKS AND COMPLICATIONS   Generally, THIAGO is a safe procedure. However, as with any procedure, complications can occur. Possible complications include an esophageal tear (rupture).  BEFORE THE PROCEDURE   · Do not eat or drink for 6 hours before the procedure or as directed by your health care provider.  · Arrange for someone to drive you home after the procedure. Do not drive yourself home. During the procedure, you will be given medicines that can continue to make  you feel drowsy and can impair your reflexes.  · An IV access tube will be started in the arm.  PROCEDURE   · A medicine to help you relax (sedative) will be given through the IV access tube.  · A medicine may be sprayed or gargled to numb the back of the throat.  · Your blood pressure, heart rate, and breathing (vital signs) will be monitored during the procedure.  · The THIAGO probe is a long, flexible tube. The tip of the probe is placed into the back of the mouth, and you will be asked to swallow. This helps to pass the tip of the probe into the esophagus. Once the tip of the probe is in the correct area, your health care provider can take pictures of the heart.  · THIAGO is usually not a painful procedure. You may feel the probe press against the back of the throat. The probe does not enter the trachea and does not affect your breathing.  AFTER THE PROCEDURE   · You will be in bed, resting, until you have fully returned to consciousness.  · When you first awaken, your throat may feel slightly sore and will probably still feel numb. This will improve slowly over time.  · You will not be allowed to eat or drink until it is clear that the numbness has improved.  · Once you have been able to drink, urinate, and sit on the edge of the bed without feeling sick to your stomach (nausea) or dizzy, you may be cleared to go home.  · You should have a friend or family member with you for the next 24 hours after your procedure.     This information is not intended to replace advice given to you by your health care provider. Make sure you discuss any questions you have with your health care provider.     Document Released: 03/09/2004 Document Revised: 12/23/2014 Document Reviewed: 06/19/2014  [employer] Interactive Patient Education ©2016 [employer]         PREVENTING SURGICAL SITE INFECTIONS     Surgical Site Infections FAQs  What is a Surgical Site Infection (SSI)?  A surgical site infection is an infection that occurs after  surgery in the part of the body where the surgery took place. Most patients who have surgery do not develop an infection. However, infections develop in about 1 to 3 out of every 100 patients who have surgery.  Some of the common symptoms of a surgical site infection are:  · Redness and pain around the area where you had surgery  · Drainage of cloudy fluid from your surgical wound  · Fever  Can SSIs be treated?  Yes. Most surgical site infections can be treated with antibiotics. The antibiotic given to you depends on the bacteria (germs) causing the infection. Sometimes patients with SSIs also need another surgery to treat the infection.  What are some of the things that hospitals are doing to prevent SSIs?  To prevent SSIs, doctors, nurses, and other healthcare providers:  · Clean their hands and arms up to their elbows with an antiseptic agent just before the surgery.  · Clean their hands with soap and water or an alcohol-based hand rub before and after caring for each patient.  · May remove some of your hair immediately before your surgery using electric clippers if the hair is in the same area where the procedure will occur. They should not shave you with a razor.  · Wear special hair covers, masks, gowns, and gloves during surgery to keep the surgery area clean.  · Give you antibiotics before your surgery starts. In most cases, you should get antibiotics within 60 minutes before the surgery starts and the antibiotics should be stopped within 24 hours after surgery.  · Clean the skin at the site of your surgery with a special soap that kills germs.  What can I do to help prevent SSIs?  Before your surgery:  · Tell your doctor about other medical problems you may have. Health problems such as allergies, diabetes, and obesity could affect your surgery and your treatment.  · Quit smoking. Patients who smoke get more infections. Talk to your doctor about how you can quit before your surgery.  · Do not shave near  where you will have surgery. Shaving with a razor can irritate your skin and make it easier to develop an infection.  At the time of your surgery:  · Speak up if someone tries to shave you with a razor before surgery. Ask why you need to be shaved and talk with your surgeon if you have any concerns.  · Ask if you will get antibiotics before surgery.  After your surgery:  · Make sure that your healthcare providers clean their hands before examining you, either with soap and water or an alcohol-based hand rub.    If you do not see your providers clean their hands, please ask them to do so.  · Family and friends who visit you should not touch the surgical wound or dressings.  · Family and friends should clean their hands with soap and water or an alcohol-based hand rub before and after visiting you. If you do not see them clean their hands, ask them to clean their hands.  What do I need to do when I go home from the hospital?  · Before you go home, your doctor or nurse should explain everything you need to know about taking care of your wound. Make sure you understand how to care for your wound before you leave the hospital.  · Always clean your hands before and after caring for your wound.  · Before you go home, make sure you know who to contact if you have questions or problems after you get home.  · If you have any symptoms of an infection, such as redness and pain at the surgery site, drainage, or fever, call your doctor immediately.  If you have additional questions, please ask your doctor or nurse.  Developed and co-sponsored by The Society for Healthcare Epidemiology of [place] (SHEA); Infectious Diseases Society of [place] (IDSA); American Hospital Association; Association for Professionals in Infection Control and Epidemiology (APIC); Centers for Disease Control and Prevention (CDC); and The Joint Commission.     This information is not intended to replace advice given to you by your health care provider. Make  sure you discuss any questions you have with your health care provider.     Document Released: 12/23/2014 Document Revised: 01/08/2016 Document Reviewed: 03/02/2016  [employer] Interactive Patient Education ©2016 [employer]             SYMPTOMS OF A STROKE    Call 911 or have someone take you to the Emergency Department if you have any of the following:    · Sudden numbness or weakness of your face, arm or leg especially on one side of the body  · Sudden confusion, diffiiculty speaking or trouble understanding   · Changes in your vision or loss of sight in one eye  · Sudden severe headache with no known cause  · sudden dizziness, trouble walking, loss of balance or coordination    It is important to seek emergency care right away if you have further stroke symptoms. If you get emergency help quickly, the powerful clot-dissolving medicines can reduce the disabilities caused by a stroke.     For more information:    American Stroke Association  7-081-4-STROKE  www.strokeassociation.org           IF YOU SMOKE OR USE TOBACCO PLEASE READ THE FOLLOWING:    Why is smoking bad for me?  Smoking increases the risk of heart disease, lung disease, vascular disease, stroke, and cancer.     If you smoke, STOP!    If you would like more information on quitting smoking, please visit the Currently website: [email or web address]/[email or web address]/healthier-together/smoke   This link will provide additional resources including the QUIT line and the Beat the Pack support groups.     For more information:    American Cancer Society  ([phone]    American Heart Association  1-694.830.3556               YOU ARE THE MOST IMPORTANT FACTOR IN YOUR RECOVERY.     Follow all instructions carefully.     I have reviewed my discharge instructions with my nurse, including the following information, if applicable:     Information about my illness and diagnosis   Follow up appointments (including lab draws)   Wound Care   Equipment  Needs   Medications (new and continuing) along with side effects   Preventative information such as vaccines and smoking cessations   Diet   Pain   I know when to contact my Doctor's office or seek emergency care      I want my nurse to describe the side effects of my medications: YES NO   If the answer is no, I understand the side effects of my medications: YES NO   My nurse described the side effects of my medications in a way that I could understand: YES NO   I have taken my personal belongings and my own medications with me at discharge: YES NO            I have received this information and my questions have been answered. I have discussed any concerns I see with this plan with the nurse or physician. I understand these instructions.    Signature of Patient or Responsible Person: _____________________________________    Date: _________________  Time: __________________    Signature of Healthcare Provider: _______________________________________  Date: _________________  Time: __________________

## 2017-01-18 ENCOUNTER — APPOINTMENT (OUTPATIENT)
Dept: GENERAL RADIOLOGY | Facility: HOSPITAL | Age: 72
End: 2017-01-18

## 2017-01-18 LAB
ALBUMIN SERPL-MCNC: 4 G/DL (ref 3.5–5.2)
ANION GAP SERPL CALCULATED.3IONS-SCNC: 13 MMOL/L
BUN BLD-MCNC: 15 MG/DL (ref 8–23)
BUN/CREAT SERPL: 11.2 (ref 7–25)
CALCIUM SPEC-SCNC: 8.1 MG/DL (ref 8.6–10.5)
CHLORIDE SERPL-SCNC: 107 MMOL/L (ref 98–107)
CO2 SERPL-SCNC: 26 MMOL/L (ref 22–29)
CREAT BLD-MCNC: 1.34 MG/DL (ref 0.76–1.27)
CYTO UR: NORMAL
DEPRECATED RDW RBC AUTO: 47 FL (ref 37–54)
ERYTHROCYTE [DISTWIDTH] IN BLOOD BY AUTOMATED COUNT: 13.8 % (ref 11.5–14.5)
GFR SERPL CREATININE-BSD FRML MDRD: 53 ML/MIN/1.73
GLUCOSE BLD-MCNC: 105 MG/DL (ref 65–99)
GLUCOSE BLDC GLUCOMTR-MCNC: 109 MG/DL (ref 70–130)
GLUCOSE BLDC GLUCOMTR-MCNC: 110 MG/DL (ref 70–130)
GLUCOSE BLDC GLUCOMTR-MCNC: 118 MG/DL (ref 70–130)
GLUCOSE BLDC GLUCOMTR-MCNC: 124 MG/DL (ref 70–130)
GLUCOSE BLDC GLUCOMTR-MCNC: 127 MG/DL (ref 70–130)
GLUCOSE BLDC GLUCOMTR-MCNC: 142 MG/DL (ref 70–130)
GLUCOSE BLDC GLUCOMTR-MCNC: 96 MG/DL (ref 70–130)
HCT VFR BLD AUTO: 39.2 % (ref 40.4–52.2)
HGB BLD-MCNC: 12.1 G/DL (ref 13.7–17.6)
INR PPP: 1.23 (ref 0.9–1.1)
LAB AP CASE REPORT: NORMAL
Lab: NORMAL
MAGNESIUM SERPL-MCNC: 2.5 MG/DL (ref 1.6–2.4)
MCH RBC QN AUTO: 29.2 PG (ref 27–32.7)
MCHC RBC AUTO-ENTMCNC: 30.9 G/DL (ref 32.6–36.4)
MCV RBC AUTO: 94.5 FL (ref 79.8–96.2)
PATH REPORT.FINAL DX SPEC: NORMAL
PATH REPORT.GROSS SPEC: NORMAL
PHOSPHATE SERPL-MCNC: 4.5 MG/DL (ref 2.5–4.5)
PLATELET # BLD AUTO: 109 10*3/MM3 (ref 140–500)
PMV BLD AUTO: 10.8 FL (ref 6–12)
POTASSIUM BLD-SCNC: 3.9 MMOL/L (ref 3.5–5.2)
PROTHROMBIN TIME: 15 SECONDS (ref 11.7–14.2)
RBC # BLD AUTO: 4.15 10*6/MM3 (ref 4.6–6)
SODIUM BLD-SCNC: 146 MMOL/L (ref 136–145)
WBC NRBC COR # BLD: 10.89 10*3/MM3 (ref 4.5–10.7)

## 2017-01-18 PROCEDURE — 93005 ELECTROCARDIOGRAM TRACING: CPT | Performed by: THORACIC SURGERY (CARDIOTHORACIC VASCULAR SURGERY)

## 2017-01-18 PROCEDURE — 94799 UNLISTED PULMONARY SVC/PX: CPT

## 2017-01-18 PROCEDURE — 80069 RENAL FUNCTION PANEL: CPT | Performed by: THORACIC SURGERY (CARDIOTHORACIC VASCULAR SURGERY)

## 2017-01-18 PROCEDURE — 25010000003 CEFAZOLIN IN DEXTROSE 2-4 GM/100ML-% SOLUTION: Performed by: THORACIC SURGERY (CARDIOTHORACIC VASCULAR SURGERY)

## 2017-01-18 PROCEDURE — 25010000002 MAGNESIUM SULFATE IN D5W 1G/100ML (PREMIX) 10-5 MG/ML-% SOLUTION: Performed by: THORACIC SURGERY (CARDIOTHORACIC VASCULAR SURGERY)

## 2017-01-18 PROCEDURE — 85610 PROTHROMBIN TIME: CPT | Performed by: THORACIC SURGERY (CARDIOTHORACIC VASCULAR SURGERY)

## 2017-01-18 PROCEDURE — 93010 ELECTROCARDIOGRAM REPORT: CPT | Performed by: INTERNAL MEDICINE

## 2017-01-18 PROCEDURE — 94640 AIRWAY INHALATION TREATMENT: CPT

## 2017-01-18 PROCEDURE — 25010000002 ENOXAPARIN PER 10 MG: Performed by: THORACIC SURGERY (CARDIOTHORACIC VASCULAR SURGERY)

## 2017-01-18 PROCEDURE — 25010000003 POTASSIUM CHLORIDE PER 2 MEQ: Performed by: THORACIC SURGERY (CARDIOTHORACIC VASCULAR SURGERY)

## 2017-01-18 PROCEDURE — 83735 ASSAY OF MAGNESIUM: CPT | Performed by: THORACIC SURGERY (CARDIOTHORACIC VASCULAR SURGERY)

## 2017-01-18 PROCEDURE — 82962 GLUCOSE BLOOD TEST: CPT

## 2017-01-18 PROCEDURE — 97162 PT EVAL MOD COMPLEX 30 MIN: CPT

## 2017-01-18 PROCEDURE — 85027 COMPLETE CBC AUTOMATED: CPT | Performed by: THORACIC SURGERY (CARDIOTHORACIC VASCULAR SURGERY)

## 2017-01-18 PROCEDURE — 25010000002 METOCLOPRAMIDE PER 10 MG: Performed by: THORACIC SURGERY (CARDIOTHORACIC VASCULAR SURGERY)

## 2017-01-18 PROCEDURE — 71010 HC CHEST PA OR AP: CPT

## 2017-01-18 PROCEDURE — 97110 THERAPEUTIC EXERCISES: CPT

## 2017-01-18 PROCEDURE — 99024 POSTOP FOLLOW-UP VISIT: CPT | Performed by: NURSE PRACTITIONER

## 2017-01-18 RX ORDER — ALBUTEROL SULFATE 2.5 MG/3ML
2.5 SOLUTION RESPIRATORY (INHALATION) EVERY 4 HOURS PRN
Status: DISCONTINUED | OUTPATIENT
Start: 2017-01-18 | End: 2017-01-20

## 2017-01-18 RX ORDER — NICOTINE POLACRILEX 4 MG
15 LOZENGE BUCCAL
Status: DISCONTINUED | OUTPATIENT
Start: 2017-01-18 | End: 2017-01-25 | Stop reason: HOSPADM

## 2017-01-18 RX ORDER — AMIODARONE HYDROCHLORIDE 200 MG/1
400 TABLET ORAL EVERY 12 HOURS SCHEDULED
Status: DISCONTINUED | OUTPATIENT
Start: 2017-01-18 | End: 2017-01-20

## 2017-01-18 RX ORDER — GUAIFENESIN 600 MG/1
1200 TABLET, EXTENDED RELEASE ORAL 2 TIMES DAILY
Status: DISCONTINUED | OUTPATIENT
Start: 2017-01-18 | End: 2017-01-25 | Stop reason: HOSPADM

## 2017-01-18 RX ORDER — DEXTROSE MONOHYDRATE 25 G/50ML
25 INJECTION, SOLUTION INTRAVENOUS
Status: DISCONTINUED | OUTPATIENT
Start: 2017-01-18 | End: 2017-01-25 | Stop reason: HOSPADM

## 2017-01-18 RX ADMIN — ALPRAZOLAM 0.25 MG: 0.25 TABLET ORAL at 17:07

## 2017-01-18 RX ADMIN — ALPRAZOLAM 0.25 MG: 0.25 TABLET ORAL at 00:03

## 2017-01-18 RX ADMIN — GUAIFENESIN 1200 MG: 600 TABLET, EXTENDED RELEASE ORAL at 17:07

## 2017-01-18 RX ADMIN — GUAIFENESIN 1200 MG: 600 TABLET, EXTENDED RELEASE ORAL at 12:11

## 2017-01-18 RX ADMIN — PANTOPRAZOLE SODIUM 40 MG: 40 INJECTION, POWDER, FOR SOLUTION INTRAVENOUS at 05:23

## 2017-01-18 RX ADMIN — HYDROCODONE BITARTRATE AND ACETAMINOPHEN 2 TABLET: 5; 325 TABLET ORAL at 04:43

## 2017-01-18 RX ADMIN — ASPIRIN 81 MG: 81 TABLET ORAL at 08:31

## 2017-01-18 RX ADMIN — CEFAZOLIN SODIUM 2 G: 2 INJECTION, SOLUTION INTRAVENOUS at 00:34

## 2017-01-18 RX ADMIN — HYDROCODONE BITARTRATE AND ACETAMINOPHEN 2 TABLET: 5; 325 TABLET ORAL at 00:03

## 2017-01-18 RX ADMIN — CYCLOBENZAPRINE HYDROCHLORIDE 10 MG: 10 TABLET, FILM COATED ORAL at 11:15

## 2017-01-18 RX ADMIN — AMIODARONE HYDROCHLORIDE 400 MG: 200 TABLET ORAL at 20:26

## 2017-01-18 RX ADMIN — OXYCODONE HYDROCHLORIDE 10 MG: 5 TABLET ORAL at 08:32

## 2017-01-18 RX ADMIN — POTASSIUM CHLORIDE 20 MEQ: 400 INJECTION, SOLUTION INTRAVENOUS at 04:40

## 2017-01-18 RX ADMIN — HYDROCODONE BITARTRATE AND ACETAMINOPHEN 2 TABLET: 5; 325 TABLET ORAL at 15:45

## 2017-01-18 RX ADMIN — METOPROLOL TARTRATE 12.5 MG: 25 TABLET ORAL at 20:25

## 2017-01-18 RX ADMIN — CEFAZOLIN SODIUM 2 G: 2 INJECTION, SOLUTION INTRAVENOUS at 12:09

## 2017-01-18 RX ADMIN — AMIODARONE HYDROCHLORIDE 400 MG: 200 TABLET ORAL at 12:11

## 2017-01-18 RX ADMIN — HYDROCODONE BITARTRATE AND ACETAMINOPHEN 2 TABLET: 5; 325 TABLET ORAL at 20:26

## 2017-01-18 RX ADMIN — METOPROLOL TARTRATE 12.5 MG: 25 TABLET ORAL at 08:31

## 2017-01-18 RX ADMIN — METOCLOPRAMIDE 10 MG: 5 INJECTION, SOLUTION INTRAMUSCULAR; INTRAVENOUS at 06:18

## 2017-01-18 RX ADMIN — MAGNESIUM SULFATE HEPTAHYDRATE 1 G: 1 INJECTION, SOLUTION INTRAVENOUS at 04:40

## 2017-01-18 RX ADMIN — METOCLOPRAMIDE 10 MG: 5 INJECTION, SOLUTION INTRAMUSCULAR; INTRAVENOUS at 00:33

## 2017-01-18 RX ADMIN — ALBUTEROL SULFATE 2.5 MG: 2.5 SOLUTION RESPIRATORY (INHALATION) at 17:20

## 2017-01-18 RX ADMIN — DOCUSATE SODIUM -SENNOSIDES 2 TABLET: 50; 8.6 TABLET, COATED ORAL at 20:26

## 2017-01-18 RX ADMIN — CEFAZOLIN SODIUM 2 G: 2 INJECTION, SOLUTION INTRAVENOUS at 20:27

## 2017-01-18 RX ADMIN — ATORVASTATIN CALCIUM 40 MG: 40 TABLET, FILM COATED ORAL at 20:26

## 2017-01-18 RX ADMIN — ENOXAPARIN SODIUM 40 MG: 40 INJECTION SUBCUTANEOUS at 17:07

## 2017-01-18 NOTE — PROGRESS NOTES
" LOS: 1 day   Patient Care Team:  Jonathan García MD as PCP - General (Internal Medicine)    Chief Complaint: F/u CABG    Subjective     Doing okay, c/o incisional pain      Vital Signs  Heart Rate:  [74-93] 82  Resp:  [10-18] 18  BP: ()/(53-94) 93/57  Arterial Line BP: ()/(38-74) 112/47  FiO2 (%):  [40 %-100 %] 40 %  Body mass index is 33.82 kg/(m^2).    Intake/Output Summary (Last 24 hours) at 01/18/17 0706  Last data filed at 01/18/17 0606   Gross per 24 hour   Intake   5943 ml   Output   6005 ml   Net    -62 ml          Chest tube drainage last 8 hours: 230/0 cc    Last 3 weights    01/18/17  0203 01/18/17  0403 01/18/17  0606   Weight: 226 lb (103 kg) 226 lb (103 kg) 235 lb 11.2 oz (107 kg) (daily weight)         Objective:  General Appearance:  Comfortable and in no acute distress.    Vital signs: (most recent): Blood pressure 93/57, pulse 84, temperature 98.7 °F (37.1 °C), temperature source Oral, resp. rate 18, height 70\" (177.8 cm), weight 235 lb 11.2 oz (107 kg), SpO2 97 %.  Vital signs are normal.    Output: Producing urine.    Lungs:  Normal respiratory rate and normal effort.  There are decreased breath sounds (bases).    Heart: Normal rate.  Regular rhythm.  S1 normal and S2 normal.  (Tele: SR)  Abdomen: Abdomen is soft.    Extremities: Normal range of motion.    Neurological: Patient is alert and oriented to person, place and time.    Skin:  Warm and dry.  (Incision wnl)            Results Review:        WBC WBC   Date Value Ref Range Status   01/18/2017 10.89 (H) 4.50 - 10.70 10*3/mm3 Final   01/17/2017 18.64 (H) 4.50 - 10.70 10*3/mm3 Final   01/17/2017 13.93 (H) 4.50 - 10.70 10*3/mm3 Final   01/17/2017 13.69 (H) 4.50 - 10.70 10*3/mm3 Final   01/17/2017 10.25 4.50 - 10.70 10*3/mm3 Final      HGB HEMOGLOBIN   Date Value Ref Range Status   01/18/2017 12.1 (L) 13.7 - 17.6 g/dL Final   01/17/2017 13.0 (L) 13.7 - 17.6 g/dL Final   01/17/2017 12.1 (L) 13.7 - 17.6 g/dL Final   01/17/2017 " 12.0 (L) 13.7 - 17.6 g/dL Final   01/17/2017 10.2 (L) 12.0 - 17.0 g/dL Final   01/17/2017 10.6 (L) 13.7 - 17.6 g/dL Final   01/17/2017 9.9 (L) 12.0 - 17.0 g/dL Final   01/17/2017 10.5 (L) 12.0 - 17.0 g/dL Final   01/17/2017 10.9 (L) 12.0 - 17.0 g/dL Final   01/17/2017 11.2 (L) 12.0 - 17.0 g/dL Final   01/17/2017 10.5 (L) 12.0 - 17.0 g/dL Final   01/17/2017 13.9 12.0 - 17.0 g/dL Final      HCT HEMATOCRIT   Date Value Ref Range Status   01/18/2017 39.2 (L) 40.4 - 52.2 % Final   01/17/2017 41.0 40.4 - 52.2 % Final   01/17/2017 37.5 (L) 40.4 - 52.2 % Final   01/17/2017 37.1 (L) 40.4 - 52.2 % Final   01/17/2017 30 (L) 38 - 51 % Final   01/17/2017 32.1 (L) 40.4 - 52.2 % Final   01/17/2017 29 (L) 38 - 51 % Final   01/17/2017 31 (L) 38 - 51 % Final   01/17/2017 32 (L) 38 - 51 % Final   01/17/2017 33 (L) 38 - 51 % Final   01/17/2017 31 (L) 38 - 51 % Final   01/17/2017 41 38 - 51 % Final      Platelets PLATELETS   Date Value Ref Range Status   01/18/2017 109 (L) 140 - 500 10*3/mm3 Final   01/17/2017 128 (L) 140 - 500 10*3/mm3 Final   01/17/2017 102 (L) 140 - 500 10*3/mm3 Final   01/17/2017 104 (L) 140 - 500 10*3/mm3 Final   01/17/2017 118 (L) 140 - 500 10*3/mm3 Final        PT/INR:    PROTIME   Date Value Ref Range Status   01/18/2017 15.0 (H) 11.7 - 14.2 Seconds Final   01/17/2017 17.6 (H) 11.7 - 14.2 Seconds Final   01/17/2017 18.9 (H) 11.7 - 14.2 Seconds Final   /  INR   Date Value Ref Range Status   01/18/2017 1.23 (H) 0.90 - 1.10 Final   01/17/2017 1.50 (H) 0.90 - 1.10 Final   01/17/2017 1.64 (H) 0.90 - 1.10 Final       Sodium SODIUM   Date Value Ref Range Status   01/18/2017 146 (H) 136 - 145 mmol/L Final   01/17/2017 146 (H) 136 - 145 mmol/L Final   01/17/2017 145 136 - 145 mmol/L Final      Potassium POTASSIUM   Date Value Ref Range Status   01/18/2017 3.9 3.5 - 5.2 mmol/L Final   01/17/2017 4.6 3.5 - 5.2 mmol/L Final   01/17/2017 4.0 3.5 - 5.2 mmol/L Final      Chloride CHLORIDE   Date Value Ref Range Status    01/18/2017 107 98 - 107 mmol/L Final   01/17/2017 106 98 - 107 mmol/L Final   01/17/2017 107 98 - 107 mmol/L Final      Bicarbonate CO2   Date Value Ref Range Status   01/18/2017 26.0 22.0 - 29.0 mmol/L Final   01/17/2017 27.5 22.0 - 29.0 mmol/L Final   01/17/2017 24.2 22.0 - 29.0 mmol/L Final      BUN BUN   Date Value Ref Range Status   01/18/2017 15 8 - 23 mg/dL Final   01/17/2017 14 8 - 23 mg/dL Final   01/17/2017 14 8 - 23 mg/dL Final      Creatinine CREATININE   Date Value Ref Range Status   01/18/2017 1.34 (H) 0.76 - 1.27 mg/dL Final   01/17/2017 1.47 (H) 0.76 - 1.27 mg/dL Final   01/17/2017 1.44 (H) 0.76 - 1.27 mg/dL Final      Calcium CALCIUM   Date Value Ref Range Status   01/18/2017 8.1 (L) 8.6 - 10.5 mg/dL Final   01/17/2017 8.4 (L) 8.6 - 10.5 mg/dL Final   01/17/2017 8.0 (L) 8.6 - 10.5 mg/dL Final      Magnesium MAGNESIUM   Date Value Ref Range Status   01/18/2017 2.5 (H) 1.6 - 2.4 mg/dL Final   01/17/2017 2.5 (H) 1.6 - 2.4 mg/dL Final   01/17/2017 2.6 (H) 1.6 - 2.4 mg/dL Final            aspirin 81 mg Oral Daily   atorvastatin 40 mg Oral Nightly   ceFAZolin 2 g Intravenous Q8H   chlorhexidine 15 mL Mouth/Throat Q12H   enoxaparin 40 mg Subcutaneous Daily   magnesium sulfate in D5W 1g/100mL (PREMIX) 1 g Intravenous Q8H   metoprolol tartrate 12.5 mg Oral Q12H   mupirocin 1 application Each Nare Daily   mupirocin  Each Nare BID   pantoprazole 40 mg Intravenous Q AM   Or      pantoprazole 40 mg Oral Q AM   pantoprazole 40 mg Intravenous Q24H   sennosides-docusate sodium 2 tablet Oral Nightly       amiodarone 1 mg/min    clevidipine 2-32 mg/hr    dexmedetomidine 0.2-1.5 mcg/kg/hr Last Rate: Stopped (01/18/17 0030)   DOPamine 2-20 mcg/kg/min    EPINEPHrine 0.02-0.3 mcg/kg/min    insulin regular infusion 1 unit/mL (CCU use) 0-50 Units/hr Last Rate: Stopped (01/18/17 0500)   milrinone 0.25-0.75 mcg/kg/min    niCARdipine 5-15 mg/hr    nitroglycerin 5-200 mcg/min    norepinephrine 0.02-0.3 mcg/kg/min Last Rate:  Stopped (01/17/17 1615)   phenylephrine 0.5-3 mcg/kg/min Last Rate: Stopped (01/18/17 0245)   propofol 5-50 mcg/kg/min Last Rate: Stopped (01/17/17 1441)   sodium chloride 30 mL/hr Last Rate: 15 mL/hr (01/17/17 1245)   sodium chloride 30 mL/hr Last Rate: 15 mL/hr (01/17/17 1245)   sodium chloride 30 mL/hr Last Rate: 30 mL/hr (01/17/17 1720)   vasopressin 0.02-0.1 Units/min            Patient Active Problem List   Diagnosis Code   • 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   • Essential hypertension I10   • Renal cyst N28.1   • Acute posthemorrhagic anemia D62   • Chronic coronary artery disease I25.10   • Abnormal result of cardiovascular function study R94.30   • Cholelithiasis K80.20   • Chronic obstructive bronchitis J44.9   • Current smoker F17.200   • AAA (abdominal aortic aneurysm) I71.4   • Angina of effort I20.8   • Coronary artery disease involving native coronary artery of native heart I25.10       Assessment & Plan     -Severe multivessel CAD  -NL LV systolic function, EF 64%  -POD#1 CABG x 4 w/LIMA & AVR per RN report, currently there is no operative note  -Expected post op acute blood loss anemia, stable  -Thrombocytopenia, stable  -HTN-stable  -COPD  -Persistent tobacco abuse  -AAA  -PAD, s/p stent to lower extremity  -Mildly elevated creatinine, 1.3 today, good urine output, will monitor  -PAC's will add amio prophylaxis, watch QTC    Increase activity, enc. Pul. Toilet, transfer to CVU    PASTOR Pichardo  01/18/17  7:06 AM

## 2017-01-18 NOTE — PLAN OF CARE
Problem: Patient Care Overview (Adult)  Goal: Plan of Care Review  Outcome: Ongoing (interventions implemented as appropriate)    01/18/17 1739   Coping/Psychosocial Response Interventions   Plan Of Care Reviewed With patient;spouse   Patient Care Overview   Progress improving   Outcome Evaluation   Outcome Summary/Follow up Plan patient moved to CVU today. still in quite a bit of pain. converted into atrial fibrillation/flutter today. attempting to control pain with medication       Goal: Adult Individualization and Mutuality  Outcome: Ongoing (interventions implemented as appropriate)  Goal: Discharge Needs Assessment  Outcome: Ongoing (interventions implemented as appropriate)    Problem: Cardiac Surgery (Adult)  Goal: Signs and Symptoms of Listed Potential Problems Will be Absent or Manageable (Cardiac Surgery)  Outcome: Ongoing (interventions implemented as appropriate)    Problem: Fall Risk (Adult)  Goal: Identify Related Risk Factors and Signs and Symptoms  Outcome: Outcome(s) achieved Date Met:  01/18/17 01/18/17 1739   Fall Risk   Fall Risk: Related Risk Factors environment unfamiliar;sleep pattern alteration   Fall Risk: Signs and Symptoms presence of risk factors       Goal: Absence of Falls  Outcome: Ongoing (interventions implemented as appropriate)

## 2017-01-18 NOTE — PLAN OF CARE
Problem: Patient Care Overview (Adult)  Goal: Plan of Care Review  Outcome: Ongoing (interventions implemented as appropriate)    01/18/17 0044   Coping/Psychosocial Response Interventions   Plan Of Care Reviewed With patient   Patient Care Overview   Progress improving         Problem: Cardiac Surgery (Adult)  Goal: Signs and Symptoms of Listed Potential Problems Will be Absent or Manageable (Cardiac Surgery)  Outcome: Ongoing (interventions implemented as appropriate)    01/18/17 0044   Cardiac Surgery   Problems Assessed (Cardiac Surgery) all   Problems Present (Cardiac Surgery) pain;electrolyte imbalance;fluid imbalance;hypoxia/hypoxemia;situational response         Problem: Fall Risk (Adult)  Goal: Identify Related Risk Factors and Signs and Symptoms  Outcome: Ongoing (interventions implemented as appropriate)    01/18/17 0044   Fall Risk   Fall Risk: Related Risk Factors age-related changes;culprit medication(s);inadequate lighting;objects hard to reach;polypharmacy;sleep pattern alteration;environment unfamiliar   Fall Risk: Signs and Symptoms presence of risk factors       Goal: Absence of Falls  Outcome: Ongoing (interventions implemented as appropriate)    01/18/17 0044   Fall Risk (Adult)   Absence of Falls making progress toward outcome

## 2017-01-18 NOTE — PROGRESS NOTES
Acute Care - Physical Therapy Initial Evaluation  [facility]     Patient Name: Conor Faustin  : 1945  MRN: [id number]  Today's Date: 2017   Onset of Illness/Injury or Date of Surgery Date: 17     Referring Physician: Liza      Admit Date: 2017     Visit Dx:    ICD-10-CM ICD-9-CM   1. Generalized weakness R53.1 780.79   2. Coronary artery disease involving native coronary artery of native heart, angina presence unspecified I25.10 414.01     Patient Active Problem List   Diagnosis   • Aneurysm of aortic arch   • Essential hypertension   • Renal cyst   • Acute posthemorrhagic anemia   • Chronic coronary artery disease   • Abnormal result of cardiovascular function study   • Cholelithiasis   • Chronic obstructive bronchitis   • Current smoker   • AAA (abdominal aortic aneurysm)   • Angina of effort   • Coronary artery disease involving native coronary artery of native heart     Past Medical History   Diagnosis Date   • AAA (abdominal aortic aneurysm)    • Benign essential hypertension    • COPD (chronic obstructive pulmonary disease)    • Coronary artery disease    • Renal cyst      RIGHT   • Smoker      1 PPD     Past Surgical History   Procedure Laterality Date   • Cardiac catheterization     • Peripheral arterial stent graft     • Coronary angioplasty     • Cardiac catheterization N/A 2016     Procedure: Left Heart Cath;  Surgeon: Irena Hale MD;  Location:  [facility];  Service:    • Cardiac catheterization            PT ASSESSMENT (last 72 hours)      PT Evaluation       17 0933 17 0637    Rehab Evaluation    Document Type evaluation  -EM     Subjective Information agree to therapy;complains of;pain  -EM     General Information    Onset of Illness/Injury or Date of Surgery Date 17  -EM     Referring Physician Liza  -EM     General Observations  male sitting up in recliner, sleeping but arousable  -EM     Pertinent History  Of Current Problem CABG x4, AVR  -EM     Precautions/Limitations cardiac precautions;sternal precautions  -EM     Prior Level of Function independent:;community mobility  -EM     Equipment Currently Used at Home none  -EM none  -TG    Plans/Goals Discussed With patient  -EM     Living Environment    Lives With spouse  -EM spouse  -TG    Living Arrangements house  -EM house  -TG    Home Accessibility no concerns  -EM no concerns  -TG    Type of Financial/Environmental Concern  none  -TG    Transportation Available  car  -TG    Clinical Impression    Patient/Family Goals Statement decrease pain, go home  -EM     Criteria for Skilled Therapeutic Interventions Met yes;treatment indicated  -EM     Impairments Found (describe specific impairments) gait, locomotion, and balance  -EM     Rehab Potential good, to achieve stated therapy goals  -EM     Vital Signs    Pre Systolic BP Rehab 127  -EM     Pre Treatment Diastolic BP 72  -EM     Pretreatment Heart Rate (beats/min) 85  -EM     Posttreatment Heart Rate (beats/min) 96  -EM     Pre SpO2 (%) 96  -EM     O2 Delivery Pre Treatment supplemental O2  -EM     Post SpO2 (%) 94  -EM     O2 Delivery Post Treatment supplemental O2  -EM     Pain Assessment    Pain Assessment 0-10  -EM     Pain Score 9  -EM     Pain Location Chest  -EM     Pain Intervention(s) Medication (See MAR)  -EM     Cognitive Assessment/Intervention    Current Cognitive/Communication Assessment functional  -EM     Orientation Status oriented x 4  -EM     Follows Commands/Answers Questions 100% of the time  -EM     ROM (Range of Motion)    General ROM no range of motion deficits identified  -EM     MMT (Manual Muscle Testing)    General MMT Assessment no strength deficits identified   no focal deficits identified, generalized post op weakness  -EM     Bed Mobility, Assessment/Treatment    Bed Mob, Supine to Sit, [place] not tested  -EM     Transfer Assessment/Treatment    Transfers, Sit-Stand [place]  minimum assist (75% patient effort);verbal cues required  -EM     Transfers, Stand-Sit [place] minimum assist (75% patient effort)  -EM     Gait Assessment/Treatment    Gait, [place] Level minimum assist (75% patient effort);2 person assist required;hand held assist  -EM     Gait, Distance (Feet) 45  -EM     Gait, Gait Deviations step length decreased;mary decreased;forward flexed posture  -EM     Gait, Comment very guarded, cues for slow breathing, grunting throughout ambulation  -EM     Therapy Exercises    Exercise Protocols --   5 reps cardiac protocol, Level II  -EM     Positioning and Restraints    Pre-Treatment Position sitting in chair/recliner  -EM     Post Treatment Position chair  -EM     In Chair reclined;with nsg  -EM       User Key  (r) = Recorded By, (t) = Taken By, (c) = Cosigned By    Initials Name Provider Type    TG Suzi Brown, RN Registered Nurse    EM Cristy Garcia, PT Physical Therapist          Physical Therapy Education     Title: PT OT SLP Therapies (Active)     Topic: Physical Therapy (Active)     Point: Mobility training (Active)    Learning Progress Summary    Learner Readiness Method Response Comment Documented by Status   Patient Acceptance E NR  EM 01/18/17 0939 Active               Point: Home exercise program (Active)    Learning Progress Summary    Learner Readiness Method Response Comment Documented by Status   Patient Acceptance E NR  EM 01/18/17 0939 Active                      User Key     Initials Effective Dates Name Provider Type Discipline    EM 12/01/15 -  Cristy Garcia, PT Physical Therapist PT                PT Recommendation and Plan  Anticipated Discharge Disposition: home with assist  Planned Therapy Interventions: bed mobility training, gait training, home exercise program  PT Frequency: daily  Plan of Care Review  Plan Of Care Reviewed With: patient  Progress: improving  Outcome Summary/Follow up Plan: patient presents with generalized  post op weakness and pain, decreased activity tolerance, decreased balance and would benefit from skilled PT. Anticipate patient will be able to return shayna with assist, lots of pain c/o today, asking when chest tubes will come out.           IP PT Goals       01/18/17 0939          Cardiopulmonary PT LTG    Cardiopulmonary PT LTG, Date Established 01/18/17  -EM      Cardiopulmonary PT LTG, Time to Achieve 1 wk  -EM      Cardiopulmonary PT LTG, Level Level V  -EM        User Key  (r) = Recorded By, (t) = Taken By, (c) = Cosigned By    Initials Name Provider Type    ARPIT Garcia PT Physical Therapist                Outcome Measures       01/18/17 0900          How much help from another person do you currently need...    Turning from your back to your side while in flat bed without using bedrails? 3  -EM      Moving from lying on back to sitting on the side of a flat bed without bedrails? 3  -EM      Moving to and from a bed to a chair (including a wheelchair)? 3  -EM      Standing up from a chair using your arms (e.g., wheelchair, bedside chair)? 3  -EM      Climbing 3-5 steps with a railing? 2  -EM      To walk in hospital room? 3  -EM      AM-PAC 6 Clicks Score 17  -EM      Functional Assessment    Outcome Measure Options AM-PAC 6 Clicks Basic Mobility (PT)  -EM        User Key  (r) = Recorded By, (t) = Taken By, (c) = Cosigned By    Initials Name Provider Type    ARPIT Garcia PT Physical Therapist           Time Calculation:         PT Charges       01/18/17 0945          Time Calculation    Start Time 0835  -EM      Stop Time 0850  -EM      Time Calculation (min) 15 min  -EM      PT Received On 01/18/17  -EM      PT - Next Appointment 01/19/17  -EM      PT Goal Re-Cert Due Date 01/25/17  -EM        User Key  (r) = Recorded By, (t) = Taken By, (c) = Cosigned By    Initials Name Provider Type    ARPIT Garcia PT Physical Therapist          Therapy Charges for Today     Code  Description Service Date Service Provider Modifiers Qty    28215370572 HC PT EVAL MOD COMPLEXITY 2 1/18/2017 Cristy Garcia, PT GP 1    55967057887 HC PT THER PROC EA 15 MIN 1/18/2017 Cristy Garcia, PT GP 1    40811449907 HC PT THER SUPP EA 15 MIN 1/18/2017 Cristy Garcia, PT GP 1          PT G-Codes  Outcome Measure Options: AM-PAC 6 Clicks Basic Mobility (PT)      Cristy Garcia, PT  1/18/2017

## 2017-01-18 NOTE — NURSING NOTE
Spoke with Dr. De Jesus regarding patient's rhythm change (afib aflutter). No new orders at this time.

## 2017-01-18 NOTE — PLAN OF CARE
Problem: Patient Care Overview (Adult)  Goal: Plan of Care Review    01/18/17 0939   Outcome Evaluation   Outcome Summary/Follow up Plan patient presents with generalized post op weakness and pain, decreased activity tolerance, decreased balance and would benefit from skilled PT. Anticipate patient will be able to return shayna with assist, lots of pain c/o today, asking when chest tubes will come out.          Problem: Inpatient Physical Therapy  Goal: Cardiopulmonary Goal LTG- PT    01/18/17 0939   Cardiopulmonary PT LTG   Cardiopulmonary PT LTG, Date Established 01/18/17   Cardiopulmonary PT LTG, Time to Achieve 1 wk   Cardiopulmonary PT LTG, Level Level V

## 2017-01-19 ENCOUNTER — APPOINTMENT (OUTPATIENT)
Dept: GENERAL RADIOLOGY | Facility: HOSPITAL | Age: 72
End: 2017-01-19

## 2017-01-19 LAB
ANION GAP SERPL CALCULATED.3IONS-SCNC: 12.1 MMOL/L
BUN BLD-MCNC: 18 MG/DL (ref 8–23)
BUN/CREAT SERPL: 13.4 (ref 7–25)
CALCIUM SPEC-SCNC: 8.5 MG/DL (ref 8.6–10.5)
CHLORIDE SERPL-SCNC: 100 MMOL/L (ref 98–107)
CO2 SERPL-SCNC: 26.9 MMOL/L (ref 22–29)
CREAT BLD-MCNC: 1.34 MG/DL (ref 0.76–1.27)
DEPRECATED RDW RBC AUTO: 48.6 FL (ref 37–54)
ERYTHROCYTE [DISTWIDTH] IN BLOOD BY AUTOMATED COUNT: 13.9 % (ref 11.5–14.5)
GFR SERPL CREATININE-BSD FRML MDRD: 53 ML/MIN/1.73
GLUCOSE BLD-MCNC: 140 MG/DL (ref 65–99)
GLUCOSE BLDC GLUCOMTR-MCNC: 134 MG/DL (ref 70–130)
GLUCOSE BLDC GLUCOMTR-MCNC: 153 MG/DL (ref 70–130)
GLUCOSE BLDC GLUCOMTR-MCNC: 158 MG/DL (ref 70–130)
GLUCOSE BLDC GLUCOMTR-MCNC: 160 MG/DL (ref 70–130)
GLUCOSE BLDC GLUCOMTR-MCNC: 164 MG/DL (ref 70–130)
GLUCOSE BLDC GLUCOMTR-MCNC: 208 MG/DL (ref 70–130)
GLUCOSE BLDC GLUCOMTR-MCNC: 64 MG/DL (ref 70–130)
HCT VFR BLD AUTO: 42.7 % (ref 40.4–52.2)
HGB BLD-MCNC: 13.1 G/DL (ref 13.7–17.6)
MCH RBC QN AUTO: 29.6 PG (ref 27–32.7)
MCHC RBC AUTO-ENTMCNC: 30.7 G/DL (ref 32.6–36.4)
MCV RBC AUTO: 96.4 FL (ref 79.8–96.2)
PLATELET # BLD AUTO: 106 10*3/MM3 (ref 140–500)
PMV BLD AUTO: 10.9 FL (ref 6–12)
POTASSIUM BLD-SCNC: 4.1 MMOL/L (ref 3.5–5.2)
RBC # BLD AUTO: 4.43 10*6/MM3 (ref 4.6–6)
SODIUM BLD-SCNC: 139 MMOL/L (ref 136–145)
WBC NRBC COR # BLD: 14.27 10*3/MM3 (ref 4.5–10.7)

## 2017-01-19 PROCEDURE — 71010 HC CHEST PA OR AP: CPT

## 2017-01-19 PROCEDURE — 85027 COMPLETE CBC AUTOMATED: CPT | Performed by: THORACIC SURGERY (CARDIOTHORACIC VASCULAR SURGERY)

## 2017-01-19 PROCEDURE — 93005 ELECTROCARDIOGRAM TRACING: CPT | Performed by: THORACIC SURGERY (CARDIOTHORACIC VASCULAR SURGERY)

## 2017-01-19 PROCEDURE — 25010000002 PROMETHAZINE PER 50 MG: Performed by: THORACIC SURGERY (CARDIOTHORACIC VASCULAR SURGERY)

## 2017-01-19 PROCEDURE — 82962 GLUCOSE BLOOD TEST: CPT

## 2017-01-19 PROCEDURE — 93010 ELECTROCARDIOGRAM REPORT: CPT | Performed by: INTERNAL MEDICINE

## 2017-01-19 PROCEDURE — 25010000002 FUROSEMIDE PER 20 MG: Performed by: NURSE PRACTITIONER

## 2017-01-19 PROCEDURE — 25010000002 ENOXAPARIN PER 10 MG: Performed by: THORACIC SURGERY (CARDIOTHORACIC VASCULAR SURGERY)

## 2017-01-19 PROCEDURE — 80048 BASIC METABOLIC PNL TOTAL CA: CPT | Performed by: NURSE PRACTITIONER

## 2017-01-19 PROCEDURE — 63710000001 INSULIN ASPART PER 5 UNITS: Performed by: NURSE PRACTITIONER

## 2017-01-19 PROCEDURE — 25010000003 CEFAZOLIN IN DEXTROSE 2-4 GM/100ML-% SOLUTION: Performed by: THORACIC SURGERY (CARDIOTHORACIC VASCULAR SURGERY)

## 2017-01-19 PROCEDURE — 99024 POSTOP FOLLOW-UP VISIT: CPT | Performed by: NURSE PRACTITIONER

## 2017-01-19 PROCEDURE — 97110 THERAPEUTIC EXERCISES: CPT

## 2017-01-19 RX ORDER — BUDESONIDE AND FORMOTEROL FUMARATE DIHYDRATE 160; 4.5 UG/1; UG/1
2 AEROSOL RESPIRATORY (INHALATION)
Status: DISCONTINUED | OUTPATIENT
Start: 2017-01-19 | End: 2017-01-25 | Stop reason: HOSPADM

## 2017-01-19 RX ORDER — ALBUTEROL SULFATE 2.5 MG/3ML
2.5 SOLUTION RESPIRATORY (INHALATION) EVERY 6 HOURS PRN
Status: DISCONTINUED | OUTPATIENT
Start: 2017-01-19 | End: 2017-01-25 | Stop reason: HOSPADM

## 2017-01-19 RX ORDER — FUROSEMIDE 10 MG/ML
20 INJECTION INTRAMUSCULAR; INTRAVENOUS ONCE
Status: COMPLETED | OUTPATIENT
Start: 2017-01-19 | End: 2017-01-19

## 2017-01-19 RX ADMIN — HYDROCODONE BITARTRATE AND ACETAMINOPHEN 2 TABLET: 5; 325 TABLET ORAL at 17:24

## 2017-01-19 RX ADMIN — DOCUSATE SODIUM -SENNOSIDES 2 TABLET: 50; 8.6 TABLET, COATED ORAL at 21:45

## 2017-01-19 RX ADMIN — PANTOPRAZOLE SODIUM 40 MG: 40 TABLET, DELAYED RELEASE ORAL at 06:39

## 2017-01-19 RX ADMIN — GUAIFENESIN 1200 MG: 600 TABLET, EXTENDED RELEASE ORAL at 08:26

## 2017-01-19 RX ADMIN — FUROSEMIDE 20 MG: 10 INJECTION, SOLUTION INTRAMUSCULAR; INTRAVENOUS at 13:10

## 2017-01-19 RX ADMIN — PROMETHAZINE HYDROCHLORIDE 12.5 MG: 25 INJECTION INTRAMUSCULAR; INTRAVENOUS at 21:41

## 2017-01-19 RX ADMIN — HYDROCODONE BITARTRATE AND ACETAMINOPHEN 2 TABLET: 5; 325 TABLET ORAL at 21:44

## 2017-01-19 RX ADMIN — ENOXAPARIN SODIUM 40 MG: 40 INJECTION SUBCUTANEOUS at 08:21

## 2017-01-19 RX ADMIN — CEFAZOLIN SODIUM 2 G: 2 INJECTION, SOLUTION INTRAVENOUS at 04:24

## 2017-01-19 RX ADMIN — GUAIFENESIN 1200 MG: 600 TABLET, EXTENDED RELEASE ORAL at 17:24

## 2017-01-19 RX ADMIN — AMIODARONE HYDROCHLORIDE 400 MG: 200 TABLET ORAL at 21:44

## 2017-01-19 RX ADMIN — MUPIROCIN 1 APPLICATION: 20 OINTMENT TOPICAL at 08:21

## 2017-01-19 RX ADMIN — INSULIN ASPART 2 UNITS: 100 INJECTION, SOLUTION INTRAVENOUS; SUBCUTANEOUS at 06:39

## 2017-01-19 RX ADMIN — HYDROCODONE BITARTRATE AND ACETAMINOPHEN 2 TABLET: 5; 325 TABLET ORAL at 04:17

## 2017-01-19 RX ADMIN — HYDROCODONE BITARTRATE AND ACETAMINOPHEN 2 TABLET: 5; 325 TABLET ORAL at 08:21

## 2017-01-19 RX ADMIN — METOPROLOL TARTRATE 12.5 MG: 25 TABLET ORAL at 08:21

## 2017-01-19 RX ADMIN — ATORVASTATIN CALCIUM 40 MG: 40 TABLET, FILM COATED ORAL at 21:45

## 2017-01-19 RX ADMIN — METOPROLOL TARTRATE 12.5 MG: 25 TABLET ORAL at 21:45

## 2017-01-19 RX ADMIN — ASPIRIN 81 MG: 81 TABLET ORAL at 08:22

## 2017-01-19 RX ADMIN — AMIODARONE HYDROCHLORIDE 400 MG: 200 TABLET ORAL at 08:26

## 2017-01-19 RX ADMIN — HYDROCODONE BITARTRATE AND ACETAMINOPHEN 2 TABLET: 5; 325 TABLET ORAL at 13:06

## 2017-01-19 RX ADMIN — HYDROCODONE BITARTRATE AND ACETAMINOPHEN 2 TABLET: 5; 325 TABLET ORAL at 00:25

## 2017-01-19 RX ADMIN — INSULIN ASPART 2 UNITS: 100 INJECTION, SOLUTION INTRAVENOUS; SUBCUTANEOUS at 17:24

## 2017-01-19 NOTE — PROGRESS NOTES
Acute Care - Physical Therapy Treatment Note  [facility]     Patient Name: Conor Faustin  : 1945  MRN: [id number]  Today's Date: 2017  Onset of Illness/Injury or Date of Surgery Date: 17     Referring Physician: Liza    Admit Date: 2017    Visit Dx:    ICD-10-CM ICD-9-CM   1. Generalized weakness R53.1 780.79   2. Coronary artery disease involving native coronary artery of native heart, angina presence unspecified I25.10 414.01     Patient Active Problem List   Diagnosis   • Aneurysm of aortic arch   • Essential hypertension   • Renal cyst   • Acute posthemorrhagic anemia   • Chronic coronary artery disease   • Abnormal result of cardiovascular function study   • Cholelithiasis   • Chronic obstructive bronchitis   • Current smoker   • AAA (abdominal aortic aneurysm)   • Angina of effort   • Coronary artery disease involving native coronary artery of native heart               Adult Rehabilitation Note       17 1007          Rehab Assessment/Intervention    Discipline physical therapist  -EM      Document Type therapy note (daily note)  -EM      Subjective Information agree to therapy;complains of;pain  -EM      Precautions/Limitations cardiac precautions;sternal precautions  -EM      Recorded by [EM] Cristy Garcia, PT      Vital Signs    Pre Systolic BP Rehab 125  -EM      Pre Treatment Diastolic BP 64  -EM      Pretreatment Heart Rate (beats/min) 80  -EM      Posttreatment Heart Rate (beats/min) 97  -EM      Pre SpO2 (%) 93  -EM      O2 Delivery Pre Treatment supplemental O2  -EM      Post SpO2 (%) 93  -EM      O2 Delivery Post Treatment supplemental O2  -EM      Recorded by [EM] Cristy Garcia, PT      Pain Assessment    Pain Assessment 0-10  -EM      Pain Score 5  -EM      Pain Location Chest  -EM      Pain Intervention(s) Medication (See MAR)  -EM      Recorded by [EM] Cristy Garcia, PT      Transfer Assessment/Treatment    Transfers, Sit-Stand [place]  minimum assist (75% patient effort);verbal cues required  -EM      Recorded by [EM] Cristy Garcia, PT      Gait Assessment/Treatment    Gait, [place] Level minimum assist (75% patient effort);1 person + 1 person to manage equipment;hand held assist;verbal cues required  -EM      Gait, Distance (Feet) 75  -EM      Gait, Gait Deviations step length decreased;forward flexed posture;mary decreased  -EM      Recorded by [EM] Cristy Garcia, PT      Therapy Exercises    Exercise Protocols --   5 reps cardiac protocol, Level 3   -EM      Recorded by [EM] Cristy Garcia, PT      Positioning and Restraints    Pre-Treatment Position sitting in chair/recliner  -EM      Post Treatment Position chair  -EM      In Chair reclined;call light within reach;with family/caregiver  -EM      Recorded by [EM] Cristy Garcia, PT        User Key  (r) = Recorded By, (t) = Taken By, (c) = Cosigned By    Initials Name Effective Dates    ARPIT Garcia PT 12/01/15 -                 IP PT Goals       01/18/17 0939          Cardiopulmonary PT LTG    Cardiopulmonary PT LTG, Date Established 01/18/17  -EM      Cardiopulmonary PT LTG, Time to Achieve 1 wk  -EM      Cardiopulmonary PT LTG, Level Level V  -EM        User Key  (r) = Recorded By, (t) = Taken By, (c) = Cosigned By    Initials Name Provider Type    ARPIT Garcia, PT Physical Therapist          Physical Therapy Education     Title: PT OT SLP Therapies (Active)     Topic: Physical Therapy (Active)     Point: Mobility training (Active)    Learning Progress Summary    Learner Readiness Method Response Comment Documented by Status   Patient Acceptance E NR  EM 01/19/17 1009 Active    Acceptance E NR   01/18/17 0939 Active               Point: Home exercise program (Active)    Learning Progress Summary    Learner Readiness Method Response Comment Documented by Status   Patient Acceptance E NR  EM 01/19/17 1009 Active    Acceptance E NR  EM  01/18/17 0939 Active                      User Key     Initials Effective Dates Name Provider Type [facility] 12/01/15 -  Cristy Garcia PT Physical Therapist PT                    PT Recommendation and Plan  Anticipated Discharge Disposition: home with assist  Planned Therapy Interventions: bed mobility training, gait training, home exercise program  PT Frequency: daily  Plan of Care Review  Plan Of Care Reviewed With: patient  Progress: progress towards functional goals is fair  Outcome Summary/Follow up Plan: patient still very guarded with mobility, cues for slow deep breathing and to relax upper body as he ambulates.           Outcome Measures       01/19/17 1000 01/18/17 0900       How much help from another person do you currently need...    Turning from your back to your side while in flat bed without using bedrails? 3  -EM 3  -EM     Moving from lying on back to sitting on the side of a flat bed without bedrails? 3  -EM 3  -EM     Moving to and from a bed to a chair (including a wheelchair)? 3  -EM 3  -EM     Standing up from a chair using your arms (e.g., wheelchair, bedside chair)? 3  -EM 3  -EM     Climbing 3-5 steps with a railing? 2  -EM 2  -EM     To walk in hospital room? 3  -EM 3  -EM     AM-PAC 6 Clicks Score 17  -EM 17  -EM     Functional Assessment    Outcome Measure Options  AM-PAC 6 Clicks Basic Mobility (PT)  -EM       User Key  (r) = Recorded By, (t) = Taken By, (c) = Cosigned By    Initials Name Provider Type    ARPIT Garcia PT Physical Therapist           Time Calculation:         PT Charges       01/19/17 1010          Time Calculation    Start Time 0943  -EM      Stop Time 0957  -EM      Time Calculation (min) 14 min  -EM      PT Received On 01/19/17  -EM      PT - Next Appointment 01/20/17  -EM        User Key  (r) = Recorded By, (t) = Taken By, (c) = Cosigned By    Initials Name Provider Type    ARPIT Garcia PT Physical Therapist          Therapy  Charges for Today     Code Description Service Date Service Provider Modifiers Qty    25824757351 HC PT EVAL MOD COMPLEXITY 2 1/18/2017 Cristy Garcia, PT GP 1    99556599891 HC PT THER PROC EA 15 MIN 1/18/2017 Cristy Garcia, PT GP 1    00354992731 HC PT THER SUPP EA 15 MIN 1/18/2017 Cristy Garcia, PT GP 1    38039428301 HC PT THER PROC EA 15 MIN 1/19/2017 Cristy Garcia, PT GP 1    50377576969 HC PT THER SUPP EA 15 MIN 1/19/2017 Cristy Garcia, PT GP 1          PT G-Codes  Outcome Measure Options: AM-PAC 6 Clicks Basic Mobility (PT)    Cristy Garcia, PT  1/19/2017

## 2017-01-19 NOTE — OP NOTE
Date of Procedure:  01/17/2017    PREOPERATIVE DIAGNOSES:  1. Severe 3-vessel coronary artery disease.  2. Moderate to severe aortic regurgitation.  3. Obesity.  4. Severe lung disease/emphysema.  5. Dyspnea on exertion.    POSTOPERATIVE DIAGNOSES:  1. Severe 3-vessel coronary artery disease.  2. Moderate to severe aortic regurgitation.  3. Obesity.  4. Severe lung disease/emphysema.  5. Dyspnea on exertion.    PROCEDURES PERFORMED:  1. Elective coronary artery bypass graft x4 with left internal mammary artery to left anterior descending and reverse individual saphenous vein graft to the 1st diagonal obtuse marginal 1 and posterior descending artery.  2. Aortic valve replacement with a 25 mm Magna pericardial prosthesis.  3. Endoscopic vein harvest of the right lower extremity.    SURGEON: Jassi De Jesus MD    ASSISTANT: CIARA Washington    ANESTHESIA: General with intubation and THIAGO.    FINDINGS: The saphenous vein graft was harvested endoscopically using the [employer] MediaView 6 System for small incision of the medial aspect of the right knee. The vein had diameter of 4 mm. It was harvested all the way up to the fossa ovalis and to the mid-calf. The wounds were closed in layers using Resorb material.     STS DATA: The patient was explained the risks and benefits of surgery, was calculated by the STS score operative mortality calculator and the patient agreed to proceed. Antibiotics and beta blockers were given within the STS required window.    DESCRIPTION OF PROCEDURE: The patient was put supine on the operating room. After general anesthesia was given, he was prepped from chin to toes with DuraPrep and draped using the usual technique. Following, a median sternotomy incision was performed with a knife. All of the layers carried down to the sternum using electrocautery. Using a vertical oscillating saw, the sternum was split in the midline and hemostasis was achieved. Following, the left KATHE was harvested, skeletonized  and prepared with papaverine. It had a diameter of 2 to 2.5 mm with excellent quality. A Favaloro-Mak retractor was placed and anterior mediastinum was exposed. The pericardium was opened and the edges were tacked to the wound. We used 4-0 Prolene pursestring to cannulate the distal ascending aorta and the right atrium. After a full dose of heparin was given and once ACT was over 400, we placed the patient on right atrial cardiopulmonary bypass using small plastic tip cannulas and we placed an antegrade and retrograde cardioplegia cannulas. We commenced cardiopulmonary bypass and we applied an ascending aortic crossclamp. We gave 1L of cold blood cardioplegia. Will give further doses of cardioplegia every 15 minutes thereafter. Following, we completed the anastomosis in an end-to-side between the reverse saphenous vein graft and first the PDA, second the obtuse marginal 1, and third the diagonal. We used 7-0 Prolene continuous suture end-to-side fashion. Deaired the graft, measured the graft to the proximal ascending aorta and left the proximal part of the vein ready for anastomosis. Following that, we completed anastomosis between the LIMA and the mid-LAD, which was intramyocardial. We used 7-0 Prolene continuous suture in end-to-side fashion. Deaired the graft and reapplied the bulldog clamp. Following, we performed transverse aortotomy in the proximal ascending aorta into the known coronary sinus. We used 2-0 silk sutures for tack up and retraction. We resected the leaflets and we decalcified the annulus thoroughly. We irrigated the area with saline solution and then we sized the valve to a 25 mm Magna pericardial prosthesis, which was what was recommended by the . Following, we used 2-0 Ethibond pledgeted sutures in a supraannular fashion circumferentially around the annulus, and passed the sutures through the sewing ring of the valve which was as recommended by the . We used a COR-KNOT  device to tie all the knots in the valve and cut the strings. It was a nice fit and there was no evidence of a paravalvular leak. Following that, we closed the aortotomy using double layer of 4-0 Prolene continuous suture as described by Dr. Blas. After that, we used a #11 blade and 4 mm punch. We made 3 punch aortotomies and completed proximal anastomosis using 4 mm punch and 6-0 Prolene continuous suture. We deaired the aortic root, tied down and left metal markers. We gave a warm dose cardioplegia and removed the aortic crossclamp with a time of 111 minutes. We allowed time for rewarming. We removed bulldog clamp from the KATHE. We suctioned the left pleural space and ventilated the lungs. We placed right ventricular and right atrial temporary pericardial wires. We allowed the heart to reject and under THIAGO guidance, we deaired the left heart chambers until no air was seen. Once the patient was rewarmed with good paced rhythm and good contractility, we discussed cardiopulmonary bypass. Removed the venous cardioplegia cannulas and secured with pursestrings. The total cardiopulmonary bypass time was 133 minutes. We gave a matching dose of protamine. Then, at completion, we removed the aortic cannula and secured with pursestring with good hemostasis in all surgical sites. We applied platelet rich plasma to the wound and we placed two 28-Swazi chest tubes in the mediastinum and one in the left pleural space. We applied platelet-rich plasma to the wound as I mentioned and then closed the sternum using #7 and #6 wires. We twisted and tied and cut the tips and hid them. Closed the wound in layers in usual fashion. The patient tolerated the procedure well. He was transferred stable to the recovery room.        Jassi De Jesus M.D.  SP:erasto  D:   01/18/2017 20:43:51  T:   01/19/2017 02:36:10  Job ID:   [id number]   Document ID:   [id number]  cc:  Irena Hale M.D.

## 2017-01-19 NOTE — H&P
H&P update for surgery  Pt examined and note review.  No changes.  Pt OK for surgery    Jassi silva M.D.

## 2017-01-19 NOTE — PLAN OF CARE
Problem: Patient Care Overview (Adult)  Goal: Plan of Care Review  Outcome: Ongoing (interventions implemented as appropriate)    01/19/17 1009   Coping/Psychosocial Response Interventions   Plan Of Care Reviewed With patient   Patient Care Overview   Progress progress towards functional goals is fair   Outcome Evaluation   Outcome Summary/Follow up Plan patient still very guarded with mobility, cues for slow deep breathing and to relax upper body as he ambulates.

## 2017-01-19 NOTE — PLAN OF CARE
Problem: Patient Care Overview (Adult)  Goal: Plan of Care Review  Outcome: Ongoing (interventions implemented as appropriate)    01/19/17 0312   Coping/Psychosocial Response Interventions   Plan Of Care Reviewed With patient   Patient Care Overview   Progress progress toward functional goals as expected   Outcome Evaluation   Outcome Summary/Follow up Plan patient converted back to sinus rhythm, c/o of pain but is controlled <4         Problem: Cardiac Surgery (Adult)  Goal: Signs and Symptoms of Listed Potential Problems Will be Absent or Manageable (Cardiac Surgery)  Outcome: Ongoing (interventions implemented as appropriate)    01/19/17 0312   Cardiac Surgery   Problems Assessed (Cardiac Surgery) all   Problems Present (Cardiac Surgery) pain;dysrhythmia/arrhythmia         Problem: Fall Risk (Adult)  Goal: Absence of Falls  Outcome: Ongoing (interventions implemented as appropriate)    01/19/17 0312   Fall Risk (Adult)   Absence of Falls making progress toward outcome

## 2017-01-19 NOTE — PROGRESS NOTES
" LOS: 2 days   Patient Care Team:  Jonathan García MD as PCP - General (Internal Medicine)    Chief Complaint: F/u CABG    Subjective     C/o incisional pain      Vital Signs  Temp:  [98.1 °F (36.7 °C)-98.9 °F (37.2 °C)] 98.9 °F (37.2 °C)  Heart Rate:  [63-97] 78  Resp:  [16-20] 16  BP: ()/(63-90) 125/64  Body mass index is 33.43 kg/(m^2).    Intake/Output Summary (Last 24 hours) at 01/19/17 1205  Last data filed at 01/19/17 0924   Gross per 24 hour   Intake    220 ml   Output   1105 ml   Net   -885 ml     I/O this shift:  In: 120 [P.O.:120]  Out: -     Chest tube drainage last 12 hours:160 (M) /30 (P) cc (+ intermittent air leak)    Last 3 weights    01/18/17  0403 01/18/17  0606 01/19/17  0700   Weight: 226 lb (103 kg) 235 lb 11.2 oz (107 kg) (daily weight) 233 lb (106 kg)         Objective:  General Appearance:  Comfortable and in no acute distress.    Vital signs: (most recent): Blood pressure 125/64, pulse 78, temperature 98.9 °F (37.2 °C), temperature source Oral, resp. rate 16, height 70\" (177.8 cm), weight 233 lb (106 kg), SpO2 95 %.  Vital signs are normal.    Lungs:  Normal respiratory rate and normal effort.  There are rhonchi (few scattered rhonchi) and decreased breath sounds.    Heart: Normal rate.  Regular rhythm.  S1 normal and S2 normal.  (Tele: SR)  Extremities: Normal range of motion.    Neurological: Patient is alert and oriented to person, place and time.    Skin:  Warm and dry.  (Incision wnl)            Results Review:        WBC WBC   Date Value Ref Range Status   01/19/2017 14.27 (H) 4.50 - 10.70 10*3/mm3 Final   01/18/2017 10.89 (H) 4.50 - 10.70 10*3/mm3 Final   01/17/2017 18.64 (H) 4.50 - 10.70 10*3/mm3 Final   01/17/2017 13.93 (H) 4.50 - 10.70 10*3/mm3 Final   01/17/2017 13.69 (H) 4.50 - 10.70 10*3/mm3 Final   01/17/2017 10.25 4.50 - 10.70 10*3/mm3 Final      HGB HEMOGLOBIN   Date Value Ref Range Status   01/19/2017 13.1 (L) 13.7 - 17.6 g/dL Final   01/18/2017 12.1 (L) 13.7 - " 17.6 g/dL Final   01/17/2017 13.0 (L) 13.7 - 17.6 g/dL Final   01/17/2017 12.1 (L) 13.7 - 17.6 g/dL Final   01/17/2017 12.0 (L) 13.7 - 17.6 g/dL Final   01/17/2017 10.2 (L) 12.0 - 17.0 g/dL Final   01/17/2017 10.6 (L) 13.7 - 17.6 g/dL Final   01/17/2017 9.9 (L) 12.0 - 17.0 g/dL Final   01/17/2017 10.5 (L) 12.0 - 17.0 g/dL Final   01/17/2017 10.9 (L) 12.0 - 17.0 g/dL Final   01/17/2017 11.2 (L) 12.0 - 17.0 g/dL Final   01/17/2017 10.5 (L) 12.0 - 17.0 g/dL Final   01/17/2017 13.9 12.0 - 17.0 g/dL Final      HCT HEMATOCRIT   Date Value Ref Range Status   01/19/2017 42.7 40.4 - 52.2 % Final   01/18/2017 39.2 (L) 40.4 - 52.2 % Final   01/17/2017 41.0 40.4 - 52.2 % Final   01/17/2017 37.5 (L) 40.4 - 52.2 % Final   01/17/2017 37.1 (L) 40.4 - 52.2 % Final   01/17/2017 30 (L) 38 - 51 % Final   01/17/2017 32.1 (L) 40.4 - 52.2 % Final   01/17/2017 29 (L) 38 - 51 % Final   01/17/2017 31 (L) 38 - 51 % Final   01/17/2017 32 (L) 38 - 51 % Final   01/17/2017 33 (L) 38 - 51 % Final   01/17/2017 31 (L) 38 - 51 % Final   01/17/2017 41 38 - 51 % Final      Platelets PLATELETS   Date Value Ref Range Status   01/19/2017 106 (L) 140 - 500 10*3/mm3 Final   01/18/2017 109 (L) 140 - 500 10*3/mm3 Final   01/17/2017 128 (L) 140 - 500 10*3/mm3 Final   01/17/2017 102 (L) 140 - 500 10*3/mm3 Final   01/17/2017 104 (L) 140 - 500 10*3/mm3 Final   01/17/2017 118 (L) 140 - 500 10*3/mm3 Final        PT/INR:    PROTIME   Date Value Ref Range Status   01/18/2017 15.0 (H) 11.7 - 14.2 Seconds Final   01/17/2017 17.6 (H) 11.7 - 14.2 Seconds Final   01/17/2017 18.9 (H) 11.7 - 14.2 Seconds Final   /  INR   Date Value Ref Range Status   01/18/2017 1.23 (H) 0.90 - 1.10 Final   01/17/2017 1.50 (H) 0.90 - 1.10 Final   01/17/2017 1.64 (H) 0.90 - 1.10 Final       Sodium SODIUM   Date Value Ref Range Status   01/19/2017 139 136 - 145 mmol/L Final   01/18/2017 146 (H) 136 - 145 mmol/L Final   01/17/2017 146 (H) 136 - 145 mmol/L Final   01/17/2017 145 136 - 145 mmol/L  Final      Potassium POTASSIUM   Date Value Ref Range Status   01/19/2017 4.1 3.5 - 5.2 mmol/L Final   01/18/2017 3.9 3.5 - 5.2 mmol/L Final   01/17/2017 4.6 3.5 - 5.2 mmol/L Final   01/17/2017 4.0 3.5 - 5.2 mmol/L Final      Chloride CHLORIDE   Date Value Ref Range Status   01/19/2017 100 98 - 107 mmol/L Final   01/18/2017 107 98 - 107 mmol/L Final   01/17/2017 106 98 - 107 mmol/L Final   01/17/2017 107 98 - 107 mmol/L Final      Bicarbonate CO2   Date Value Ref Range Status   01/19/2017 26.9 22.0 - 29.0 mmol/L Final   01/18/2017 26.0 22.0 - 29.0 mmol/L Final   01/17/2017 27.5 22.0 - 29.0 mmol/L Final   01/17/2017 24.2 22.0 - 29.0 mmol/L Final      BUN BUN   Date Value Ref Range Status   01/19/2017 18 8 - 23 mg/dL Final   01/18/2017 15 8 - 23 mg/dL Final   01/17/2017 14 8 - 23 mg/dL Final   01/17/2017 14 8 - 23 mg/dL Final      Creatinine CREATININE   Date Value Ref Range Status   01/19/2017 1.34 (H) 0.76 - 1.27 mg/dL Final   01/18/2017 1.34 (H) 0.76 - 1.27 mg/dL Final   01/17/2017 1.47 (H) 0.76 - 1.27 mg/dL Final   01/17/2017 1.44 (H) 0.76 - 1.27 mg/dL Final      Calcium CALCIUM   Date Value Ref Range Status   01/19/2017 8.5 (L) 8.6 - 10.5 mg/dL Final   01/18/2017 8.1 (L) 8.6 - 10.5 mg/dL Final   01/17/2017 8.4 (L) 8.6 - 10.5 mg/dL Final   01/17/2017 8.0 (L) 8.6 - 10.5 mg/dL Final      Magnesium MAGNESIUM   Date Value Ref Range Status   01/18/2017 2.5 (H) 1.6 - 2.4 mg/dL Final   01/17/2017 2.5 (H) 1.6 - 2.4 mg/dL Final   01/17/2017 2.6 (H) 1.6 - 2.4 mg/dL Final            amiodarone 400 mg Oral Q12H   aspirin 81 mg Oral Daily   atorvastatin 40 mg Oral Nightly   enoxaparin 40 mg Subcutaneous Daily   guaiFENesin 1,200 mg Oral BID   insulin aspart 0-9 Units Subcutaneous 4x Daily AC & at Bedtime   metoprolol tartrate 12.5 mg Oral Q12H   mupirocin 1 application Each Nare Daily   pantoprazole 40 mg Oral Q AM   sennosides-docusate sodium 2 tablet Oral Nightly              Patient Active Problem List   Diagnosis Code   •  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   • Essential hypertension I10   • Renal cyst N28.1   • Acute posthemorrhagic anemia D62   • Chronic coronary artery disease I25.10   • Abnormal result of cardiovascular function study R94.30   • Cholelithiasis K80.20   • Chronic obstructive bronchitis J44.9   • Current smoker F17.200   • AAA (abdominal aortic aneurysm) I71.4   • Angina of effort I20.8   • Coronary artery disease involving native coronary artery of native heart I25.10       Assessment & Plan     -Severe multivessel CAD  -NL LV systolic function, EF 64%  -POD#2 CABG x 4 w/LIMA & AVR per RN report, currently there is no operative note  -Expected post op acute blood loss anemia, stable  -Thrombocytopenia, stable  -HTN-stable  -COPD>1/12/17 PFT's showed severe airway obstruction ? Consult pulmonary, will discuss with Dr. De Jesus  -Persistent tobacco abuse  -AAA  -PAD, s/p stent to lower extremity  -Mildly elevated creatinine, 1.3 today, good urine output, will monitor  -PAC's will add amio prophylaxis, watch QTC  -Leukocytosis without fever, monitor    He has severe COPD by recent PFT's, CXR w/developing density rt base c/w effusion and atelectasis, 2 mm left apical PTX. Will leave CT's & wires for now. Creat. Stable, will give one dose of Lasix IV, increase activity, enc. Pul. Toilet. ? Consult pulmonary, will discuss with Dr. De Jesus.        Ebony Locke, PASTOR  01/19/17  12:05 PM

## 2017-01-19 NOTE — PROGRESS NOTES
Discharge Planning Assessment  [facility]     Patient Name: Conor Fausitn  MRN: [id number]  Today's Date: 1/19/2017    Admit Date: 1/17/2017          Discharge Needs Assessment       01/19/17 1420    Living Environment    Lives With spouse    Living Arrangements house    Provides Primary Care For no one    Quality Of Family Relationships supportive;involved;stressful    Able to Return to Prior Living Arrangements yes    Discharge Needs Assessment    Concerns To Be Addressed denies needs/concerns at this time    Anticipated Changes Related to Illness none    Equipment Currently Used at Home none    Discharge Facility/Level Of Care Needs home with home health    Transportation Available family or friend will provide    Discharge Disposition still a patient            Discharge Plan       01/19/17 1420    Case Management/Social Work Plan    Plan Home with wife and [facility] to follow.    Patient/Family In Agreement With Plan yes    Additional Comments S/W wife at bedside.  Pt sleeping.  Introduced self and role of CCP.  Confirmed information on f/s.  Pt IADL'S, retired and drives.  Wife denies HH, SNF AND DME in past.  Wife states d/c plans are for Pt to go home and she chose [facility] from list provided.  Referral called to Carin with [facility] [phone] AT 14:22 PM.          Discharge Placement     Facility/Agency Request Status Selected? Address Phone Number Fax Number    [facility] Accepted    Yes [address] [place] [address] [phone]                Demographic Summary       01/19/17 1419    Referral Information    Admission Type inpatient    Primary Care Physician Information    Name Jonathan García MD            Functional Status       01/19/17 1419    Functional Status Current    Ambulation 2-->assistive person    Transferring 2-->assistive person    Toileting 2-->assistive person    Bathing 2-->assistive person    Dressing 2-->assistive person    Eating  0-->independent    Communication 0-->understands/communicates without difficulty    Swallowing (if score 2 or more for any item, consult Rehab Services) 0-->swallows foods/liquids without difficulty    Change in Functional Status Since Onset of Current Illness/Injury yes    Functional Status Prior    Ambulation 0-->independent    Transferring 0-->independent    Toileting 0-->independent    Bathing 0-->independent    Dressing 0-->independent    Eating 0-->independent    Communication 0-->understands/communicates without difficulty    Swallowing 0-->swallows foods/liquids without difficulty    Cognitive/Perceptual/Developmental    Current Mental Status/Cognitive Functioning no deficits noted            Psychosocial     None            Abuse/Neglect     None            Legal     None            Substance Abuse     None            Patient Forms     None          Iva Lew RN

## 2017-01-20 ENCOUNTER — APPOINTMENT (OUTPATIENT)
Dept: GENERAL RADIOLOGY | Facility: HOSPITAL | Age: 72
End: 2017-01-20

## 2017-01-20 LAB
ANION GAP SERPL CALCULATED.3IONS-SCNC: 15.1 MMOL/L
BUN BLD-MCNC: 21 MG/DL (ref 8–23)
BUN/CREAT SERPL: 20.4 (ref 7–25)
CALCIUM SPEC-SCNC: 8.9 MG/DL (ref 8.6–10.5)
CHLORIDE SERPL-SCNC: 99 MMOL/L (ref 98–107)
CO2 SERPL-SCNC: 22.9 MMOL/L (ref 22–29)
CREAT BLD-MCNC: 1.03 MG/DL (ref 0.76–1.27)
DEPRECATED RDW RBC AUTO: 45.4 FL (ref 37–54)
ERYTHROCYTE [DISTWIDTH] IN BLOOD BY AUTOMATED COUNT: 13.6 % (ref 11.5–14.5)
GFR SERPL CREATININE-BSD FRML MDRD: 71 ML/MIN/1.73
GLUCOSE BLD-MCNC: 169 MG/DL (ref 65–99)
GLUCOSE BLDC GLUCOMTR-MCNC: 132 MG/DL (ref 70–130)
GLUCOSE BLDC GLUCOMTR-MCNC: 149 MG/DL (ref 70–130)
GLUCOSE BLDC GLUCOMTR-MCNC: 168 MG/DL (ref 70–130)
GLUCOSE BLDC GLUCOMTR-MCNC: 214 MG/DL (ref 70–130)
HCT VFR BLD AUTO: 43.2 % (ref 40.4–52.2)
HGB BLD-MCNC: 14.1 G/DL (ref 13.7–17.6)
INR PPP: 1.02 (ref 0.9–1.1)
MCH RBC QN AUTO: 30.1 PG (ref 27–32.7)
MCHC RBC AUTO-ENTMCNC: 32.6 G/DL (ref 32.6–36.4)
MCV RBC AUTO: 92.3 FL (ref 79.8–96.2)
PLATELET # BLD AUTO: 113 10*3/MM3 (ref 140–500)
PMV BLD AUTO: 10.8 FL (ref 6–12)
POTASSIUM BLD-SCNC: 4.1 MMOL/L (ref 3.5–5.2)
PROTHROMBIN TIME: 13 SECONDS (ref 11.7–14.2)
RBC # BLD AUTO: 4.68 10*6/MM3 (ref 4.6–6)
SODIUM BLD-SCNC: 137 MMOL/L (ref 136–145)
WBC NRBC COR # BLD: 13.55 10*3/MM3 (ref 4.5–10.7)

## 2017-01-20 PROCEDURE — 80048 BASIC METABOLIC PNL TOTAL CA: CPT | Performed by: NURSE PRACTITIONER

## 2017-01-20 PROCEDURE — 85027 COMPLETE CBC AUTOMATED: CPT | Performed by: THORACIC SURGERY (CARDIOTHORACIC VASCULAR SURGERY)

## 2017-01-20 PROCEDURE — 71010 HC CHEST PA OR AP: CPT

## 2017-01-20 PROCEDURE — 97110 THERAPEUTIC EXERCISES: CPT

## 2017-01-20 PROCEDURE — 63710000001 INSULIN ASPART PER 5 UNITS: Performed by: NURSE PRACTITIONER

## 2017-01-20 PROCEDURE — 25010000002 ENOXAPARIN PER 10 MG: Performed by: THORACIC SURGERY (CARDIOTHORACIC VASCULAR SURGERY)

## 2017-01-20 PROCEDURE — 94640 AIRWAY INHALATION TREATMENT: CPT

## 2017-01-20 PROCEDURE — 93005 ELECTROCARDIOGRAM TRACING: CPT | Performed by: THORACIC SURGERY (CARDIOTHORACIC VASCULAR SURGERY)

## 2017-01-20 PROCEDURE — 99024 POSTOP FOLLOW-UP VISIT: CPT | Performed by: NURSE PRACTITIONER

## 2017-01-20 PROCEDURE — 82962 GLUCOSE BLOOD TEST: CPT

## 2017-01-20 PROCEDURE — 93010 ELECTROCARDIOGRAM REPORT: CPT | Performed by: INTERNAL MEDICINE

## 2017-01-20 PROCEDURE — 85610 PROTHROMBIN TIME: CPT | Performed by: NURSE PRACTITIONER

## 2017-01-20 RX ORDER — AMIODARONE HYDROCHLORIDE 200 MG/1
200 TABLET ORAL EVERY 12 HOURS SCHEDULED
Status: DISCONTINUED | OUTPATIENT
Start: 2017-01-20 | End: 2017-01-20

## 2017-01-20 RX ORDER — PANTOPRAZOLE SODIUM 40 MG/1
40 TABLET, DELAYED RELEASE ORAL
Status: DISCONTINUED | OUTPATIENT
Start: 2017-01-20 | End: 2017-01-25 | Stop reason: HOSPADM

## 2017-01-20 RX ORDER — WARFARIN SODIUM 2 MG/1
2 TABLET ORAL
Status: COMPLETED | OUTPATIENT
Start: 2017-01-20 | End: 2017-01-20

## 2017-01-20 RX ORDER — MAGNESIUM HYDROXIDE/ALUMINUM HYDROXICE/SIMETHICONE 120; 1200; 1200 MG/30ML; MG/30ML; MG/30ML
30 SUSPENSION ORAL EVERY 4 HOURS
Status: DISCONTINUED | OUTPATIENT
Start: 2017-01-20 | End: 2017-01-20

## 2017-01-20 RX ORDER — MAGNESIUM HYDROXIDE/ALUMINUM HYDROXICE/SIMETHICONE 120; 1200; 1200 MG/30ML; MG/30ML; MG/30ML
30 SUSPENSION ORAL EVERY 4 HOURS PRN
Status: DISCONTINUED | OUTPATIENT
Start: 2017-01-20 | End: 2017-01-25 | Stop reason: HOSPADM

## 2017-01-20 RX ADMIN — BUDESONIDE AND FORMOTEROL FUMARATE DIHYDRATE 2 PUFF: 160; 4.5 AEROSOL RESPIRATORY (INHALATION) at 21:15

## 2017-01-20 RX ADMIN — ATORVASTATIN CALCIUM 40 MG: 40 TABLET, FILM COATED ORAL at 21:34

## 2017-01-20 RX ADMIN — TIOTROPIUM BROMIDE 1 CAPSULE: 18 CAPSULE ORAL; RESPIRATORY (INHALATION) at 07:57

## 2017-01-20 RX ADMIN — HYDROCODONE BITARTRATE AND ACETAMINOPHEN 2 TABLET: 5; 325 TABLET ORAL at 21:36

## 2017-01-20 RX ADMIN — GUAIFENESIN 1200 MG: 600 TABLET, EXTENDED RELEASE ORAL at 08:52

## 2017-01-20 RX ADMIN — GUAIFENESIN 1200 MG: 600 TABLET, EXTENDED RELEASE ORAL at 17:32

## 2017-01-20 RX ADMIN — PANTOPRAZOLE SODIUM 40 MG: 40 TABLET, DELAYED RELEASE ORAL at 17:32

## 2017-01-20 RX ADMIN — WARFARIN SODIUM 2 MG: 2 TABLET ORAL at 19:22

## 2017-01-20 RX ADMIN — METOPROLOL TARTRATE 12.5 MG: 25 TABLET ORAL at 08:52

## 2017-01-20 RX ADMIN — INSULIN ASPART 2 UNITS: 100 INJECTION, SOLUTION INTRAVENOUS; SUBCUTANEOUS at 21:34

## 2017-01-20 RX ADMIN — BUDESONIDE AND FORMOTEROL FUMARATE DIHYDRATE 2 PUFF: 160; 4.5 AEROSOL RESPIRATORY (INHALATION) at 07:57

## 2017-01-20 RX ADMIN — INSULIN ASPART 4 UNITS: 100 INJECTION, SOLUTION INTRAVENOUS; SUBCUTANEOUS at 11:41

## 2017-01-20 RX ADMIN — PANTOPRAZOLE SODIUM 40 MG: 40 TABLET, DELAYED RELEASE ORAL at 06:09

## 2017-01-20 RX ADMIN — MUPIROCIN 1 APPLICATION: 20 OINTMENT TOPICAL at 08:52

## 2017-01-20 RX ADMIN — HYDROCODONE BITARTRATE AND ACETAMINOPHEN 2 TABLET: 5; 325 TABLET ORAL at 17:32

## 2017-01-20 RX ADMIN — METOPROLOL TARTRATE 12.5 MG: 25 TABLET ORAL at 21:34

## 2017-01-20 RX ADMIN — ALUMINUM HYDROXIDE, MAGNESIUM HYDROXIDE, AND SIMETHICONE 30 ML: 200; 200; 20 SUSPENSION ORAL at 07:46

## 2017-01-20 RX ADMIN — DOCUSATE SODIUM -SENNOSIDES 2 TABLET: 50; 8.6 TABLET, COATED ORAL at 21:33

## 2017-01-20 RX ADMIN — ENOXAPARIN SODIUM 40 MG: 40 INJECTION SUBCUTANEOUS at 08:52

## 2017-01-20 RX ADMIN — ASPIRIN 81 MG: 81 TABLET ORAL at 08:52

## 2017-01-20 RX ADMIN — HYDROCODONE BITARTRATE AND ACETAMINOPHEN 2 TABLET: 5; 325 TABLET ORAL at 08:52

## 2017-01-20 NOTE — PROGRESS NOTES
" LOS: 3 days   Patient Care Team:  Jonathan García MD as PCP - General (Internal Medicine)    Chief Complaint: F/u CABG    Subjective     C/O fatigue -> had nausea last night - improved after Zofran. Denies CP or SOA.      Vital Signs  Temp:  [98.2 °F (36.8 °C)-99.2 °F (37.3 °C)] 99.2 °F (37.3 °C)  Heart Rate:  [72-93] 88  Resp:  [14-20] 20  BP: (111-130)/(63-76) 117/76  Body mass index is 32.94 kg/(m^2).    Intake/Output Summary (Last 24 hours) at 01/20/17 0944  Last data filed at 01/20/17 0854   Gross per 24 hour   Intake    480 ml   Output   1040 ml   Net   -560 ml     I/O this shift:  In: 240 [P.O.:240]  Out: -     Chest tube drainage last 8 hours:  60/10 cc    Last 3 weights    01/18/17  0606 01/19/17  0700 01/20/17  0700   Weight: 235 lb 11.2 oz (107 kg) (daily weight) 233 lb (106 kg) 229 lb 9.6 oz (104 kg)         Objective:  General Appearance:  Comfortable and in no acute distress.    Vital signs: (most recent): Blood pressure 117/76, pulse 88, temperature 99.2 °F (37.3 °C), temperature source Oral, resp. rate 20, height 70\" (177.8 cm), weight 229 lb 9.6 oz (104 kg), SpO2 93 %.  Vital signs are normal.    Lungs:  Normal respiratory rate and normal effort.  There are rhonchi (few scattered rhonchi) and decreased breath sounds.    Heart: Normal rate.  Regular rhythm.  S1 normal and S2 normal.  (Tele: SR)  Chest: Symmetric chest wall expansion.   Abdomen: Abdomen is soft.  There is no abdominal tenderness.     Extremities: Normal range of motion.    Neurological: Patient is alert and oriented to person, place and time.    Skin:  Warm and dry.  (Incision wnl)            Results Review:        WBC WBC   Date Value Ref Range Status   01/20/2017 13.55 (H) 4.50 - 10.70 10*3/mm3 Final   01/19/2017 14.27 (H) 4.50 - 10.70 10*3/mm3 Final   01/18/2017 10.89 (H) 4.50 - 10.70 10*3/mm3 Final   01/17/2017 18.64 (H) 4.50 - 10.70 10*3/mm3 Final   01/17/2017 13.93 (H) 4.50 - 10.70 10*3/mm3 Final   01/17/2017 13.69 (H) 4.50 " - 10.70 10*3/mm3 Final   01/17/2017 10.25 4.50 - 10.70 10*3/mm3 Final      HGB HEMOGLOBIN   Date Value Ref Range Status   01/20/2017 14.1 13.7 - 17.6 g/dL Final   01/19/2017 13.1 (L) 13.7 - 17.6 g/dL Final   01/18/2017 12.1 (L) 13.7 - 17.6 g/dL Final   01/17/2017 13.0 (L) 13.7 - 17.6 g/dL Final   01/17/2017 12.1 (L) 13.7 - 17.6 g/dL Final   01/17/2017 12.0 (L) 13.7 - 17.6 g/dL Final   01/17/2017 10.2 (L) 12.0 - 17.0 g/dL Final   01/17/2017 10.6 (L) 13.7 - 17.6 g/dL Final   01/17/2017 9.9 (L) 12.0 - 17.0 g/dL Final   01/17/2017 10.5 (L) 12.0 - 17.0 g/dL Final      HCT HEMATOCRIT   Date Value Ref Range Status   01/20/2017 43.2 40.4 - 52.2 % Final   01/19/2017 42.7 40.4 - 52.2 % Final   01/18/2017 39.2 (L) 40.4 - 52.2 % Final   01/17/2017 41.0 40.4 - 52.2 % Final   01/17/2017 37.5 (L) 40.4 - 52.2 % Final   01/17/2017 37.1 (L) 40.4 - 52.2 % Final   01/17/2017 30 (L) 38 - 51 % Final   01/17/2017 32.1 (L) 40.4 - 52.2 % Final   01/17/2017 29 (L) 38 - 51 % Final   01/17/2017 31 (L) 38 - 51 % Final      Platelets PLATELETS   Date Value Ref Range Status   01/20/2017 113 (L) 140 - 500 10*3/mm3 Final   01/19/2017 106 (L) 140 - 500 10*3/mm3 Final   01/18/2017 109 (L) 140 - 500 10*3/mm3 Final   01/17/2017 128 (L) 140 - 500 10*3/mm3 Final   01/17/2017 102 (L) 140 - 500 10*3/mm3 Final   01/17/2017 104 (L) 140 - 500 10*3/mm3 Final   01/17/2017 118 (L) 140 - 500 10*3/mm3 Final        PT/INR:    PROTIME   Date Value Ref Range Status   01/18/2017 15.0 (H) 11.7 - 14.2 Seconds Final   01/17/2017 17.6 (H) 11.7 - 14.2 Seconds Final   01/17/2017 18.9 (H) 11.7 - 14.2 Seconds Final   /  INR   Date Value Ref Range Status   01/18/2017 1.23 (H) 0.90 - 1.10 Final   01/17/2017 1.50 (H) 0.90 - 1.10 Final   01/17/2017 1.64 (H) 0.90 - 1.10 Final       Sodium SODIUM   Date Value Ref Range Status   01/20/2017 137 136 - 145 mmol/L Final   01/19/2017 139 136 - 145 mmol/L Final   01/18/2017 146 (H) 136 - 145 mmol/L Final   01/17/2017 146 (H) 136 - 145  mmol/L Final   01/17/2017 145 136 - 145 mmol/L Final      Potassium POTASSIUM   Date Value Ref Range Status   01/20/2017 4.1 3.5 - 5.2 mmol/L Final   01/19/2017 4.1 3.5 - 5.2 mmol/L Final   01/18/2017 3.9 3.5 - 5.2 mmol/L Final   01/17/2017 4.6 3.5 - 5.2 mmol/L Final   01/17/2017 4.0 3.5 - 5.2 mmol/L Final      Chloride CHLORIDE   Date Value Ref Range Status   01/20/2017 99 98 - 107 mmol/L Final   01/19/2017 100 98 - 107 mmol/L Final   01/18/2017 107 98 - 107 mmol/L Final   01/17/2017 106 98 - 107 mmol/L Final   01/17/2017 107 98 - 107 mmol/L Final      Bicarbonate CO2   Date Value Ref Range Status   01/20/2017 22.9 22.0 - 29.0 mmol/L Final   01/19/2017 26.9 22.0 - 29.0 mmol/L Final   01/18/2017 26.0 22.0 - 29.0 mmol/L Final   01/17/2017 27.5 22.0 - 29.0 mmol/L Final   01/17/2017 24.2 22.0 - 29.0 mmol/L Final      BUN BUN   Date Value Ref Range Status   01/20/2017 21 8 - 23 mg/dL Final   01/19/2017 18 8 - 23 mg/dL Final   01/18/2017 15 8 - 23 mg/dL Final   01/17/2017 14 8 - 23 mg/dL Final   01/17/2017 14 8 - 23 mg/dL Final      Creatinine CREATININE   Date Value Ref Range Status   01/20/2017 1.03 0.76 - 1.27 mg/dL Final   01/19/2017 1.34 (H) 0.76 - 1.27 mg/dL Final   01/18/2017 1.34 (H) 0.76 - 1.27 mg/dL Final   01/17/2017 1.47 (H) 0.76 - 1.27 mg/dL Final   01/17/2017 1.44 (H) 0.76 - 1.27 mg/dL Final      Calcium CALCIUM   Date Value Ref Range Status   01/20/2017 8.9 8.6 - 10.5 mg/dL Final   01/19/2017 8.5 (L) 8.6 - 10.5 mg/dL Final   01/18/2017 8.1 (L) 8.6 - 10.5 mg/dL Final   01/17/2017 8.4 (L) 8.6 - 10.5 mg/dL Final   01/17/2017 8.0 (L) 8.6 - 10.5 mg/dL Final      Magnesium MAGNESIUM   Date Value Ref Range Status   01/18/2017 2.5 (H) 1.6 - 2.4 mg/dL Final   01/17/2017 2.5 (H) 1.6 - 2.4 mg/dL Final   01/17/2017 2.6 (H) 1.6 - 2.4 mg/dL Final            aluminum-magnesium hydroxide-simethicone 30 mL Oral Q4H   amiodarone 400 mg Oral Q12H   aspirin 81 mg Oral Daily   atorvastatin 40 mg Oral Nightly    budesonide-formoterol 2 puff Inhalation BID - RT   enoxaparin 40 mg Subcutaneous Daily   guaiFENesin 1,200 mg Oral BID   insulin aspart 0-9 Units Subcutaneous 4x Daily AC & at Bedtime   metoprolol tartrate 12.5 mg Oral Q12H   mupirocin 1 application Each Nare Daily   pantoprazole 40 mg Oral BID AC   sennosides-docusate sodium 2 tablet Oral Nightly   tiotropium 1 capsule Inhalation Daily - RT              Patient Active Problem List   Diagnosis Code   • 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   • Essential hypertension I10   • Renal cyst N28.1   • Acute posthemorrhagic anemia D62   • Chronic coronary artery disease I25.10   • Abnormal result of cardiovascular function study R94.30   • Cholelithiasis K80.20   • Chronic obstructive bronchitis J44.9   • Current smoker F17.200   • AAA (abdominal aortic aneurysm) I71.4   • Angina of effort I20.8   • Coronary artery disease involving native coronary artery of native heart I25.10       Assessment & Plan     -Severe multivessel CAD  -NL LV systolic function, EF 64%  -POD#3 CABG x 4 w/LIMA & AVR (pericardial)  -Expected post op acute blood loss anemia, stable  -Thrombocytopenia, stable  -HTN-stable  -COPD->1/12/17 PFT's showed severe airway obstruction -> pulmonary following  -Persistent tobacco abuse  -AAA  -PAD, s/p stent to lower extremity  -Mildly elevated creatinine, improved; good urine output, will monitor  -PAC's will add amio prophylaxis, watch QTC  -Leukocytosis without fever, improved today -> continue to monitor  -2mm left apical pneumothorax - CT in place - monitor    CXR 1/19/17 w/developing density rt base c/w effusion and atelectasis, 2 mm left apical PTX -> CXR pending for this am  Will review CXR when available  D/C amiodarone r/t prolonged QTc - will d/w MD -> check EKG in am  Start coumadin - for PAF; dose daily based on INR  D/C Epi wires  Mobilize    Mecca Hes, PASTOR  01/20/17  9:44 AM

## 2017-01-20 NOTE — CONSULTS
Group: [facility]         PULMONARY CONSULT NOTE    Patient Identification:  Conor Faustin  71 y.o.  male  1945  [id number]            Requesting physician: Dr. De Jesus, cardiothoracic surgery    Reason for Consultation:  Severe COPD    CC: Hypoxia, shortness of breath    History of Present Illness:  71-year-old male who is day 2 status post four-vessel CABG, and aortic valve repair.  The patient complains of shortness of breath with exertion.  This has been present for several years.  It is still present.  Now, after cardiac surgery, he is on 2-3 L of oxygen to keep oxygen saturation greater than 90%.  He is a heavy smoker.  He has smoked a pack a day for 60 years.  He has a daily cough.  He has some sputum, but he does not have the ability to cough it up.  He denies any fever or chills.  Right now he is complaining of pain in the chest, at surgical site.  He currently still has chest tubes in place after cardiac surgery.    I reviewed history and physical dictated by Dr. De Jesus on January 18, 2017.    I reviewed results of PFT performed on January 12, 2017 shows severe obstruction, with good response to bronchodilators.    Review of Systems   Constitutional: Positive for fatigue. Negative for diaphoresis and fever.   HENT: Negative for ear pain and sore throat.    Eyes: Negative for pain and visual disturbance.   Respiratory: Positive for cough and shortness of breath.    Cardiovascular: Positive for chest pain. Negative for leg swelling.   Gastrointestinal: Negative for abdominal pain and diarrhea.   Endocrine: Negative for cold intolerance and polyuria.   Genitourinary: Negative for dysuria and hematuria.   Musculoskeletal: Negative for joint swelling and myalgias.   Skin: Negative for rash and wound.   Neurological: Negative for speech difficulty and numbness.   Hematological: Negative for adenopathy. Does not bruise/bleed easily.   Psychiatric/Behavioral: Negative for agitation and  confusion.       Past Medical History:  Past Medical History   Diagnosis Date   • AAA (abdominal aortic aneurysm)    • Benign essential hypertension    • COPD (chronic obstructive pulmonary disease)    • Coronary artery disease    • Renal cyst      RIGHT   • Smoker      1 PPD       Past Surgical History:  Past Surgical History   Procedure Laterality Date   • Cardiac catheterization     • Peripheral arterial stent graft     • Coronary angioplasty     • Cardiac catheterization N/A 12/12/2016     Procedure: Left Heart Cath;  Surgeon: Irena Hale MD;  Location: [facility] CATH INVASIVE LOCATION;  Service:    • Cardiac catheterization     • Coronary artery bypass graft N/A 1/17/2017     Procedure: INTRAOPERATIVE THIAGO, MIDLINE STERNOTOMY, CORONARY ARTERY BYPASS GRAFTING X 4 UTILIZING LEFT INTERNAL MAMMARY ARTERY AND RIGHT ENDOSCOPICALLY HARVESTED GREATER SAPHENOUS VEIN, AORTIC VALVE REPLACEMENT;  Surgeon: Jassi De Jesus MD;  Location: [facility];  Service:         Home Meds:  Prescriptions Prior to Admission   Medication Sig Dispense Refill Last Dose   • aspirin 325 MG tablet Take 325 mg by mouth Daily. PREOP INSTR GIVEN   1/10/2017   • clopidogrel (PLAVIX) 75 MG tablet Take 75 mg by mouth. HOLD PER MD INSTR- STOPPED 1/10/2017   1/10/2017   • Coenzyme Q10 (COQ10) 100 MG capsule Take 400 mg by mouth Daily.   1/10/2017   • metoprolol tartrate (LOPRESSOR) 25 MG tablet Take 25 mg by mouth Daily.   2 months   • MULTIPLE VITAMINS ESSENTIAL PO Take  by mouth.   1/16/2017 at 1400   • nitroglycerin (NITROSTAT) 0.4 MG SL tablet Place 0.4 mg under the tongue Every 5 (Five) Minutes As Needed for chest pain. Take no more than 3 doses in 15 minutes.   1/13/2017   • chlorhexidine (PERIDEX) 0.12 % solution Apply 15 mL to the mouth or throat 2 (Two) Times a Day. PREOP INSTR GIVEN   1/17/2017 at 0300   • Chlorhexidine Gluconate Cloth 2 % pads Apply  topically. PREOP INSTR GIVEN   1/17/2017 at 0300   • mupirocin (BACTROBAN) 2 %  "nasal ointment into each nostril 2 (Two) Times a Day. PREOP INSTR GIVEN   1/17/2017 at 0300       Allergies:  Allergies   Allergen Reactions   • Bisoprolol Angioedema   • Clarithromycin Swelling       Social History:   Social History     Social History   • Marital status:      Spouse name: N/A   • Number of children: N/A   • Years of education: N/A     Occupational History   • Not on file.     Social History Main Topics   • Smoking status: Current Every Day Smoker     Packs/day: 1.00     Years: 64.00   • Smokeless tobacco: Never Used   • Alcohol use No   • Drug use: No   • Sexual activity: Defer     Other Topics Concern   • Not on file     Social History Narrative       Family History:  Family History   Problem Relation Age of Onset   • No Known Problems Mother    • No Known Problems Father    • No Known Problems Sister    • No Known Problems Brother    • No Known Problems Maternal Aunt    • No Known Problems Maternal Uncle    • No Known Problems Paternal Aunt    • No Known Problems Paternal Uncle    • No Known Problems Maternal Grandmother    • No Known Problems Maternal Grandfather    • Heart disease Paternal Grandmother    • Stroke Paternal Grandmother    • No Known Problems Paternal Grandfather        Physical Exam:  Visit Vitals   • /63 (BP Location: Right arm, Patient Position: Sitting)   • Pulse 72   • Temp 98.6 °F (37 °C) (Oral)   • Resp 15   • Ht 70\" (177.8 cm)   • Wt 233 lb (106 kg)   • SpO2 96%   • BMI 33.43 kg/m2    Body mass index is 33.43 kg/(m^2). 96% 233 lb (106 kg)  Physical Exam   Constitutional: He is oriented to person, place, and time. He appears well-developed. No distress.   HENT:   Head: Normocephalic.   Nose: Nose normal.   Mouth/Throat: No oropharyngeal exudate.   Eyes: Conjunctivae and EOM are normal. No scleral icterus.   Neck: No tracheal deviation present. No thyromegaly present.   Cardiovascular: Normal rate, regular rhythm and normal heart sounds.    No murmur " heard.  Pulmonary/Chest:   Chest tubes are in place.  Tender chest and shallow inspiratory efforts due to sternotomy wound which is clean, dry and intact.  Diminished breath sounds bilaterally, particularly at the bases.  I hear a few extra wheezes.   Abdominal: He exhibits no distension and no mass. There is no guarding.   Musculoskeletal: Normal range of motion. He exhibits no deformity.   Neurological: He is alert and oriented to person, place, and time. No cranial nerve deficit. He exhibits normal muscle tone.   Skin: Skin is warm. No rash noted. He is not diaphoretic. No erythema.   Psychiatric: He has a normal mood and affect. His behavior is normal.   Nursing note and vitals reviewed.      Lab Review:   LABS:  Lab Results   Component Value Date    CALCIUM 8.5 (L) 01/19/2017    PHOS 4.5 01/18/2017     Results from last 7 days  Lab Units 01/19/17  0402 01/18/17  0254 01/17/17  1607 01/17/17  1234   MAGNESIUM mg/dL  --  2.5* 2.5* 2.6*   SODIUM mmol/L 139 146* 146* 145   POTASSIUM mmol/L 4.1 3.9 4.6 4.0   CHLORIDE mmol/L 100 107 106 107   TOTAL CO2 mmol/L 26.9 26.0 27.5 24.2   BUN mg/dL 18 15 14 14   CREATININE mg/dL 1.34* 1.34* 1.47* 1.44*   GLUCOSE mg/dL 140* 105* 168* 125*   CALCIUM mg/dL 8.5* 8.1* 8.4* 8.0*   WBC 10*3/mm3 14.27* 10.89* 18.64* 13.69*  13.93*   HEMOGLOBIN g/dL 13.1* 12.1* 13.0* 12.0*  12.1*   PLATELETS 10*3/mm3 106* 109* 128* 104*  102*       Results from last 7 days  Lab Units 01/18/17  0254 01/17/17  1235 01/17/17  1051   INR  1.23* 1.50* 1.64*     No results found for: CKTOTAL, CKMB, CKMBINDEX, TROPONINI, TROPONINT  No results found for: TSH  Estimated Creatinine Clearance: 62.5 mL/min (by C-G formula based on Cr of 1.34).     Imaging: I personally visualized the images of scans/x-rays performed within last 3 days.  I agree with the radiologist's interpretation  FINDINGS:  1. Developing density at the right base consistent with effusion and  atelectasis.  2. Left base remains stable with  mild atelectasis.  3. 2 mm left apical pneumothorax with left chest tube in position.  4. Interval removal of right jugular [place]-Smiley catheter without other  change since prior study.    Assessment:  Hypoxemia  Severe COPD  Status post urinary artery bypass  Status post aortic valve replacement  Heavy smoker  Acute kidney injury    Recommendations:  We will begin bronchodilator therapy.  This patient would benefit from follow-up with us in the office for his COPD.  He was advised to quit smoking.  We will continue to try to wean oxygen, but he may be discharged on oxygen until he recovers for several weeks from now.  We will keep monitoring for any signs of infection, but at this time I believe he has some atelectasis, and no pneumonia.  I encouraged him to participate with good pulmonary hygiene, ambulation, incentive spirometer and bronchodilators.      Cory Carpio MD  1/19/2017  8:03 PM    EMR Dragon/Transcription disclaimer:     Much of this encounter note is an electronic transcription/translation of spoken language to printed text. The electronic translation of spoken language may permit erroneous, or at times, nonsensical words or phrases to be inadvertently transcribed; Although I have reviewed the note for such errors, some may still exist.

## 2017-01-20 NOTE — PLAN OF CARE
Problem: Patient Care Overview (Adult)  Goal: Plan of Care Review  Outcome: Ongoing (interventions implemented as appropriate)    01/20/17 1316   Coping/Psychosocial Response Interventions   Plan Of Care Reviewed With patient   Outcome Evaluation   Outcome Summary/Follow up Plan patient had wires and chest tubes removed today. vitals signs stable throughout shift       Goal: Adult Individualization and Mutuality  Outcome: Ongoing (interventions implemented as appropriate)  Goal: Discharge Needs Assessment  Outcome: Ongoing (interventions implemented as appropriate)    Problem: Cardiac Surgery (Adult)  Goal: Signs and Symptoms of Listed Potential Problems Will be Absent or Manageable (Cardiac Surgery)  Outcome: Ongoing (interventions implemented as appropriate)    Problem: Fall Risk (Adult)  Goal: Absence of Falls  Outcome: Ongoing (interventions implemented as appropriate)

## 2017-01-20 NOTE — PROGRESS NOTES
"Acute Care - Physical Therapy Treatment Note  [facility]     Patient Name: Conor Faustin  : 1945  MRN: [id number]  Today's Date: 2017  Onset of Illness/Injury or Date of Surgery Date: 17     Referring Physician: Liza    Admit Date: 2017    Visit Dx:    ICD-10-CM ICD-9-CM   1. Generalized weakness R53.1 780.79   2. Coronary artery disease involving native coronary artery of native heart, angina presence unspecified I25.10 414.01     Patient Active Problem List   Diagnosis   • Aneurysm of aortic arch   • Essential hypertension   • Renal cyst   • Acute posthemorrhagic anemia   • Chronic coronary artery disease   • Abnormal result of cardiovascular function study   • Cholelithiasis   • Chronic obstructive bronchitis   • Current smoker   • AAA (abdominal aortic aneurysm)   • Angina of effort   • Coronary artery disease involving native coronary artery of native heart               Adult Rehabilitation Note       17 0932 17 1007       Rehab Assessment/Intervention    Discipline physical therapist  -EM physical therapist  -EM     Document Type therapy note (daily note)  -EM therapy note (daily note)  -EM     Subjective Information agree to therapy   c/o \"gas pain\"   -EM agree to therapy;complains of;pain  -EM     Precautions/Limitations cardiac precautions;sternal precautions  -EM cardiac precautions;sternal precautions  -EM     Recorded by [EM] Cristy Garcia, PT [EM] Cristy Garcia, PT     Vital Signs    Pre Systolic BP Rehab 117  -  -EM     Pre Treatment Diastolic BP 76  -EM 64  -EM     Pretreatment Heart Rate (beats/min) 90  -EM 80  -EM     Posttreatment Heart Rate (beats/min) 106  -EM 97  -EM     Pre SpO2 (%) 94  -EM 93  -EM     O2 Delivery Pre Treatment supplemental O2  -EM supplemental O2  -EM     Post SpO2 (%)  93  -EM     O2 Delivery Post Treatment  supplemental O2  -EM     Recorded by [EM] Cristy Garcia, PT [EM] Cristy Garcia, PT     Pain " "Assessment    Pain Assessment 0-10  -EM 0-10  -EM     Pain Score 3  -EM 5  -EM     Pain Location Abdomen   \"gas pain\"   -EM Chest  -EM     Pain Intervention(s) Repositioned;Ambulation/increased activity  -EM Medication (See MAR)  -EM     Recorded by [EM] Cristy Garcia, PT [EM] Cristy Garcia PT     Cognitive Assessment/Intervention    Current Cognitive/Communication Assessment functional  -EM      Recorded by [EM] Cristy Garcia PT      Transfer Assessment/Treatment    Transfers, Sit-Stand [place] contact guard assist  -EM minimum assist (75% patient effort);verbal cues required  -EM     Transfers, Stand-Sit [place] contact guard assist  -EM      Recorded by [EM] Cristy Garcia, PT [EM] Cristy Garcia PT     Gait Assessment/Treatment    Gait, [place] Level contact guard assist;1 person + 1 person to manage equipment  -EM minimum assist (75% patient effort);1 person + 1 person to manage equipment;hand held assist;verbal cues required  -EM     Gait, Distance (Feet) 120  -EM 75  -EM     Gait, Gait Deviations mary decreased;forward flexed posture  -EM step length decreased;forward flexed posture;mary decreased  -EM     Recorded by [EM] Cristy Garcia, PT [EM] Cristy Garcia PT     Therapy Exercises    Exercise Protocols --   10 reps cardiac protocol, Level 3   -EM --   5 reps cardiac protocol, Level 3   -EM     Recorded by [EM] Cristy Garcia, PT [EM] Cristy Garcia PT     Positioning and Restraints    Pre-Treatment Position sitting in chair/recliner  -EM sitting in chair/recliner  -EM     Post Treatment Position chair  -EM chair  -EM     In Chair reclined;call light within reach;with family/caregiver  -EM reclined;call light within reach;with family/caregiver  -EM     Recorded by [EM] Cristy Garcia, PT [EM] Cristy Garcia PT       User Key  (r) = Recorded By, (t) = Taken By, (c) = Cosigned By    Initials Name Effective Dates    EM " Cristy Garcia PT 12/01/15 -                 IP PT Goals       01/18/17 0939          Cardiopulmonary PT LTG    Cardiopulmonary PT LTG, Date Established 01/18/17  -EM      Cardiopulmonary PT LTG, Time to Achieve 1 wk  -EM      Cardiopulmonary PT LTG, Level Level V  -EM        User Key  (r) = Recorded By, (t) = Taken By, (c) = Cosigned By    Initials Name Provider Type    EM Cristy Garcia PT Physical Therapist          Physical Therapy Education     Title: PT OT SLP Therapies (Active)     Topic: Physical Therapy (Active)     Point: Mobility training (Active)    Learning Progress Summary    Learner Readiness Method Response Comment Documented by Status   Patient Acceptance E NR  EM 01/20/17 0935 Active    Acceptance E NR  EM 01/19/17 1009 Active    Acceptance E NR  EM 01/18/17 0939 Active               Point: Home exercise program (Active)    Learning Progress Summary    Learner Readiness Method Response Comment Documented by Status   Patient Acceptance E NR  EM 01/20/17 0935 Active    Acceptance E NR  EM 01/19/17 1009 Active    Acceptance E NR  EM 01/18/17 0939 Active                      User Key     Initials Effective Dates Name Provider Type [facility] 12/01/15 -  Cristy Garcia PT Physical Therapist PT                    PT Recommendation and Plan  Anticipated Discharge Disposition: home with assist  Planned Therapy Interventions: bed mobility training, gait training, home exercise program  PT Frequency: daily  Plan of Care Review  Plan Of Care Reviewed With: patient  Progress: progress towards functional goals is fair  Outcome Summary/Follow up Plan: patient improved with mobility this am, less guarded, less assistance needed overall with mobility and able to tolerate increased ambulation distance           Outcome Measures       01/20/17 0900 01/19/17 1000 01/18/17 0900    How much help from another person do you currently need...    Turning from your back to your side while in flat bed  without using bedrails? 3  -EM 3  -EM 3  -EM    Moving from lying on back to sitting on the side of a flat bed without bedrails? 3  -EM 3  -EM 3  -EM    Moving to and from a bed to a chair (including a wheelchair)? 3  -EM 3  -EM 3  -EM    Standing up from a chair using your arms (e.g., wheelchair, bedside chair)? 3  -EM 3  -EM 3  -EM    Climbing 3-5 steps with a railing? 3  -EM 2  -EM 2  -EM    To walk in hospital room? 3  -EM 3  -EM 3  -EM    AM-PAC 6 Clicks Score 18  -EM 17  -EM 17  -EM    Functional Assessment    Outcome Measure Options   AM-PAC 6 Clicks Basic Mobility (PT)  -EM      User Key  (r) = Recorded By, (t) = Taken By, (c) = Cosigned By    Initials Name Provider Type    EM Cristy Garcia PT Physical Therapist           Time Calculation:         PT Charges       01/20/17 0937          Time Calculation    Start Time 0917  -EM      Stop Time 0936  -EM      Time Calculation (min) 19 min  -EM      PT Received On 01/20/17  -EM      PT - Next Appointment 01/21/17  -EM        User Key  (r) = Recorded By, (t) = Taken By, (c) = Cosigned By    Initials Name Provider Type    EM Cristy Garcia PT Physical Therapist          Therapy Charges for Today     Code Description Service Date Service Provider Modifiers Qty    94518509900 HC PT THER PROC EA 15 MIN 1/19/2017 Cristy Garcia, PT GP 1    98294125009 HC PT THER SUPP EA 15 MIN 1/19/2017 Cristy Garcia, PT GP 1    63040598623 HC PT THER PROC EA 15 MIN 1/20/2017 Cristy Garcia, PT GP 1    32779509020 HC PT THER SUPP EA 15 MIN 1/20/2017 Cristy Garcia, PT GP 1          PT G-Codes  Outcome Measure Options: AM-PAC 6 Clicks Basic Mobility (PT)    Cristy Garcia, PT  1/20/2017

## 2017-01-20 NOTE — PLAN OF CARE
Problem: Patient Care Overview (Adult)  Goal: Plan of Care Review  Outcome: Ongoing (interventions implemented as appropriate)    01/20/17 0921   Coping/Psychosocial Response Interventions   Plan Of Care Reviewed With patient   Patient Care Overview   Progress progress towards functional goals is fair   Outcome Evaluation   Outcome Summary/Follow up Plan patient improved with mobility this am, less guarded, less assistance needed overall with mobility and able to tolerate increased ambulation distance

## 2017-01-20 NOTE — PROGRESS NOTES
"[facility] PULMONARY CARE         Dr Angel Jason   LOS: 3 days   Patient Care Team:  Jonathan García MD as PCP - General (Internal Medicine)    Chief Complaint:  Follow up on COPD    Interval History:   Using the IS. Cannot do more than 750. Denies dyspnea. 2 chest tubes removed today. He's up in chair.    REVIEW OF SYSTEMS:   CARDIOVASCULAR: Chest pain at the incision and place of chest tube.. No palpitations or edema.   RESPIRATORY: No shortness of breath, cough or sputum.   GASTROINTESTINAL:Good appetite. No nausea, vomiting or diarrhea. No abdominal pain or blood.   HEMATOLOGIC: No bleeding or bruising.     Ventilator/Non-Invasive Ventilation Settings     None          Objective   Vital Signs  Temp:  [97.9 °F (36.6 °C)-99.2 °F (37.3 °C)] 97.9 °F (36.6 °C)  Heart Rate:  [78-93] 80  Resp:  [14-20] 17  BP: (109-130)/(67-86) 118/79    Intake/Output Summary (Last 24 hours) at 01/20/17 1831  Last data filed at 01/20/17 1734   Gross per 24 hour   Intake    240 ml   Output    530 ml   Net   -290 ml     Flowsheet Rows         First Filed Value    Admission Height  70\" (177.8 cm) Documented at 01/17/2017 0548    Admission Weight  226 lb 1 oz (103 kg) Documented at 01/17/2017 0548          Physical Exam:   General Appearance:    Alert, cooperative, in no acute distress   Lungs:     Decreased throughout with mild rales at the bases. No wheezing.     Heart:    Regular rhythm and normal rate, normal S1 and S2, no            murmur   Chest Wall:    No abnormalities observed   Abdomen:     Normal bowel sounds, soft,non-tender, non-distended, no guarding, no rebound tenderness   Neuro:   Conscious, alert, oriented x3   Extremities:   Moves all extremities well, no edema, no cyanosis, no             Redness          Results Review:          Results from last 7 days  Lab Units 01/20/17  0336 01/19/17  0402 01/18/17  0254   SODIUM mmol/L 137 139 146*   POTASSIUM mmol/L 4.1 4.1 3.9   CHLORIDE mmol/L 99 100 107   TOTAL CO2 " mmol/L 22.9 26.9 26.0   BUN mg/dL 21 18 15   CREATININE mg/dL 1.03 1.34* 1.34*   GLUCOSE mg/dL 169* 140* 105*   CALCIUM mg/dL 8.9 8.5* 8.1*           Results from last 7 days  Lab Units 01/20/17  0336 01/19/17  0402 01/18/17  0254   WBC 10*3/mm3 13.55* 14.27* 10.89*   HEMOGLOBIN g/dL 14.1 13.1* 12.1*   HEMATOCRIT % 43.2 42.7 39.2*   PLATELETS 10*3/mm3 113* 106* 109*       Results from last 7 days  Lab Units 01/18/17  0254 01/17/17  1235 01/17/17  1051   INR  1.23* 1.50* 1.64*   APTT seconds  --  39.5* 32.9         @[facility])@  I reviewed the patient's new clinical results.  I personally viewed and interpreted the patient's CXR        Medication Review:     aspirin 81 mg Oral Daily   atorvastatin 40 mg Oral Nightly   budesonide-formoterol 2 puff Inhalation BID - RT   enoxaparin 40 mg Subcutaneous Daily   guaiFENesin 1,200 mg Oral BID   insulin aspart 0-9 Units Subcutaneous 4x Daily AC & at Bedtime   metoprolol tartrate 12.5 mg Oral Q12H   mupirocin 1 application Each Nare Daily   pantoprazole 40 mg Oral BID AC   sennosides-docusate sodium 2 tablet Oral Nightly   tiotropium 1 capsule Inhalation Daily - RT   warfarin 2 mg Oral Once     I personally and independently reviewed the CXR from today,  Chest tube in place. Mild bilateral basal atelectasis        Assessment/Plan     1) Severe COPD  2) Heavy smoker  3) Mild atelectasis  4) Hypoxia, resolved  5) s/p CABG    - Encouraged to use the IS and flutter more often  - Will continue the current bronchodilators. I explained the utility of the medications. He does not see a pulmonologist and likely needs one as OP.  - Continue to wean off O2 and encouraged to ambulate.       Angel Jason MD  01/20/17  6:31 PM

## 2017-01-21 ENCOUNTER — APPOINTMENT (OUTPATIENT)
Dept: GENERAL RADIOLOGY | Facility: HOSPITAL | Age: 72
End: 2017-01-21

## 2017-01-21 LAB
ANION GAP SERPL CALCULATED.3IONS-SCNC: 10.4 MMOL/L
BUN BLD-MCNC: 26 MG/DL (ref 8–23)
BUN/CREAT SERPL: 23.2 (ref 7–25)
CALCIUM SPEC-SCNC: 8.7 MG/DL (ref 8.6–10.5)
CHLORIDE SERPL-SCNC: 99 MMOL/L (ref 98–107)
CO2 SERPL-SCNC: 28.6 MMOL/L (ref 22–29)
CREAT BLD-MCNC: 1.12 MG/DL (ref 0.76–1.27)
GFR SERPL CREATININE-BSD FRML MDRD: 65 ML/MIN/1.73
GLUCOSE BLD-MCNC: 145 MG/DL (ref 65–99)
GLUCOSE BLDC GLUCOMTR-MCNC: 138 MG/DL (ref 70–130)
GLUCOSE BLDC GLUCOMTR-MCNC: 154 MG/DL (ref 70–130)
GLUCOSE BLDC GLUCOMTR-MCNC: 155 MG/DL (ref 70–130)
GLUCOSE BLDC GLUCOMTR-MCNC: 186 MG/DL (ref 70–130)
INR PPP: 0.99 (ref 0.9–1.1)
POTASSIUM BLD-SCNC: 4.1 MMOL/L (ref 3.5–5.2)
PROTHROMBIN TIME: 12.7 SECONDS (ref 11.7–14.2)
SODIUM BLD-SCNC: 138 MMOL/L (ref 136–145)

## 2017-01-21 PROCEDURE — 97110 THERAPEUTIC EXERCISES: CPT

## 2017-01-21 PROCEDURE — 93005 ELECTROCARDIOGRAM TRACING: CPT | Performed by: THORACIC SURGERY (CARDIOTHORACIC VASCULAR SURGERY)

## 2017-01-21 PROCEDURE — 99024 POSTOP FOLLOW-UP VISIT: CPT | Performed by: NURSE PRACTITIONER

## 2017-01-21 PROCEDURE — 82962 GLUCOSE BLOOD TEST: CPT

## 2017-01-21 PROCEDURE — 85610 PROTHROMBIN TIME: CPT | Performed by: NURSE PRACTITIONER

## 2017-01-21 PROCEDURE — 99221 1ST HOSP IP/OBS SF/LOW 40: CPT | Performed by: INTERNAL MEDICINE

## 2017-01-21 PROCEDURE — 80048 BASIC METABOLIC PNL TOTAL CA: CPT | Performed by: NURSE PRACTITIONER

## 2017-01-21 PROCEDURE — 93010 ELECTROCARDIOGRAM REPORT: CPT | Performed by: INTERNAL MEDICINE

## 2017-01-21 PROCEDURE — 63710000001 INSULIN ASPART PER 5 UNITS: Performed by: NURSE PRACTITIONER

## 2017-01-21 PROCEDURE — 94640 AIRWAY INHALATION TREATMENT: CPT

## 2017-01-21 PROCEDURE — 25010000002 ENOXAPARIN PER 10 MG: Performed by: THORACIC SURGERY (CARDIOTHORACIC VASCULAR SURGERY)

## 2017-01-21 PROCEDURE — 71010 HC CHEST PA OR AP: CPT

## 2017-01-21 PROCEDURE — 93005 ELECTROCARDIOGRAM TRACING: CPT | Performed by: INTERNAL MEDICINE

## 2017-01-21 RX ORDER — WARFARIN SODIUM 4 MG/1
4 TABLET ORAL
Status: COMPLETED | OUTPATIENT
Start: 2017-01-21 | End: 2017-01-21

## 2017-01-21 RX ADMIN — HYDROCODONE BITARTRATE AND ACETAMINOPHEN 2 TABLET: 5; 325 TABLET ORAL at 22:11

## 2017-01-21 RX ADMIN — PANTOPRAZOLE SODIUM 40 MG: 40 TABLET, DELAYED RELEASE ORAL at 10:27

## 2017-01-21 RX ADMIN — DOCUSATE SODIUM -SENNOSIDES 2 TABLET: 50; 8.6 TABLET, COATED ORAL at 22:07

## 2017-01-21 RX ADMIN — TIOTROPIUM BROMIDE 1 CAPSULE: 18 CAPSULE ORAL; RESPIRATORY (INHALATION) at 09:17

## 2017-01-21 RX ADMIN — ASPIRIN 81 MG: 81 TABLET ORAL at 10:25

## 2017-01-21 RX ADMIN — BUDESONIDE AND FORMOTEROL FUMARATE DIHYDRATE 2 PUFF: 160; 4.5 AEROSOL RESPIRATORY (INHALATION) at 09:17

## 2017-01-21 RX ADMIN — METOPROLOL TARTRATE 12.5 MG: 25 TABLET ORAL at 10:24

## 2017-01-21 RX ADMIN — ENOXAPARIN SODIUM 40 MG: 40 INJECTION SUBCUTANEOUS at 10:30

## 2017-01-21 RX ADMIN — BUDESONIDE AND FORMOTEROL FUMARATE DIHYDRATE 2 PUFF: 160; 4.5 AEROSOL RESPIRATORY (INHALATION) at 20:49

## 2017-01-21 RX ADMIN — ATORVASTATIN CALCIUM 40 MG: 40 TABLET, FILM COATED ORAL at 22:08

## 2017-01-21 RX ADMIN — GUAIFENESIN 1200 MG: 600 TABLET, EXTENDED RELEASE ORAL at 17:04

## 2017-01-21 RX ADMIN — INSULIN ASPART 2 UNITS: 100 INJECTION, SOLUTION INTRAVENOUS; SUBCUTANEOUS at 11:11

## 2017-01-21 RX ADMIN — INSULIN ASPART 2 UNITS: 100 INJECTION, SOLUTION INTRAVENOUS; SUBCUTANEOUS at 17:05

## 2017-01-21 RX ADMIN — INSULIN ASPART 2 UNITS: 100 INJECTION, SOLUTION INTRAVENOUS; SUBCUTANEOUS at 22:08

## 2017-01-21 RX ADMIN — METOPROLOL TARTRATE 25 MG: 25 TABLET ORAL at 22:08

## 2017-01-21 RX ADMIN — MUPIROCIN 1 APPLICATION: 20 OINTMENT TOPICAL at 10:25

## 2017-01-21 RX ADMIN — WARFARIN SODIUM 4 MG: 4 TABLET ORAL at 17:04

## 2017-01-21 RX ADMIN — PANTOPRAZOLE SODIUM 40 MG: 40 TABLET, DELAYED RELEASE ORAL at 17:04

## 2017-01-21 RX ADMIN — HYDROCODONE BITARTRATE AND ACETAMINOPHEN 2 TABLET: 5; 325 TABLET ORAL at 05:12

## 2017-01-21 RX ADMIN — GUAIFENESIN 1200 MG: 600 TABLET, EXTENDED RELEASE ORAL at 10:25

## 2017-01-21 RX ADMIN — OXYCODONE HYDROCHLORIDE 10 MG: 5 TABLET ORAL at 10:31

## 2017-01-21 NOTE — PROGRESS NOTES
" LOS: 4 days   Patient Care Team:  Jonathan García MD as PCP - General (Internal Medicine)    Chief Complaint: F/u CABG    Subjective     Doing better, incisional pain with coughing       Vital Signs  Temp:  [97.9 °F (36.6 °C)-98.9 °F (37.2 °C)] 97.9 °F (36.6 °C)  Heart Rate:  [70-92] 71  Resp:  [16-20] 16  BP: (109-130)/(66-86) 121/66  Body mass index is 32.94 kg/(m^2).    Intake/Output Summary (Last 24 hours) at 01/21/17 1009  Last data filed at 01/21/17 0749   Gross per 24 hour   Intake    240 ml   Output    260 ml   Net    -20 ml     I/O this shift:  In: 240 [P.O.:240]  Out: -     Chest tube drainage last 12 hours: 25 cc but has dumped 80cc this morning and still with air leak    Last 3 weights    01/19/17  0700 01/20/17  0700 01/21/17  0606   Weight: 233 lb (106 kg) 229 lb 9.6 oz (104 kg) 229 lb 9.6 oz (104 kg)         Objective:  General Appearance:  Comfortable and in no acute distress.    Vital signs: (most recent): Blood pressure 121/66, pulse 71, temperature 97.9 °F (36.6 °C), temperature source Oral, resp. rate 16, height 70\" (177.8 cm), weight 229 lb 9.6 oz (104 kg), SpO2 95 %.  Vital signs are normal.    Lungs:  Normal respiratory rate and normal effort.  There are decreased breath sounds.    Heart: Normal rate.  Regular rhythm.  S1 normal and S2 normal.  (Tele: SR)  Abdomen: Abdomen is soft.    Extremities: Normal range of motion.    Neurological: Patient is alert and oriented to person, place and time.    Skin:  Warm and dry.  (Incision wnl)            Results Review:        WBC WBC   Date Value Ref Range Status   01/20/2017 13.55 (H) 4.50 - 10.70 10*3/mm3 Final   01/19/2017 14.27 (H) 4.50 - 10.70 10*3/mm3 Final      HGB HEMOGLOBIN   Date Value Ref Range Status   01/20/2017 14.1 13.7 - 17.6 g/dL Final   01/19/2017 13.1 (L) 13.7 - 17.6 g/dL Final      HCT HEMATOCRIT   Date Value Ref Range Status   01/20/2017 43.2 40.4 - 52.2 % Final   01/19/2017 42.7 40.4 - 52.2 % Final      Platelets PLATELETS "   Date Value Ref Range Status   01/20/2017 113 (L) 140 - 500 10*3/mm3 Final   01/19/2017 106 (L) 140 - 500 10*3/mm3 Final        PT/INR:    PROTIME   Date Value Ref Range Status   01/21/2017 12.7 11.7 - 14.2 Seconds Final   01/20/2017 13.0 11.7 - 14.2 Seconds Final   /  INR   Date Value Ref Range Status   01/21/2017 0.99 0.90 - 1.10 Final   01/20/2017 1.02 0.90 - 1.10 Final       Sodium SODIUM   Date Value Ref Range Status   01/21/2017 138 136 - 145 mmol/L Final   01/20/2017 137 136 - 145 mmol/L Final   01/19/2017 139 136 - 145 mmol/L Final      Potassium POTASSIUM   Date Value Ref Range Status   01/21/2017 4.1 3.5 - 5.2 mmol/L Final   01/20/2017 4.1 3.5 - 5.2 mmol/L Final   01/19/2017 4.1 3.5 - 5.2 mmol/L Final      Chloride CHLORIDE   Date Value Ref Range Status   01/21/2017 99 98 - 107 mmol/L Final   01/20/2017 99 98 - 107 mmol/L Final   01/19/2017 100 98 - 107 mmol/L Final      Bicarbonate CO2   Date Value Ref Range Status   01/21/2017 28.6 22.0 - 29.0 mmol/L Final   01/20/2017 22.9 22.0 - 29.0 mmol/L Final   01/19/2017 26.9 22.0 - 29.0 mmol/L Final      BUN BUN   Date Value Ref Range Status   01/21/2017 26 (H) 8 - 23 mg/dL Final   01/20/2017 21 8 - 23 mg/dL Final   01/19/2017 18 8 - 23 mg/dL Final      Creatinine CREATININE   Date Value Ref Range Status   01/21/2017 1.12 0.76 - 1.27 mg/dL Final   01/20/2017 1.03 0.76 - 1.27 mg/dL Final   01/19/2017 1.34 (H) 0.76 - 1.27 mg/dL Final      Calcium CALCIUM   Date Value Ref Range Status   01/21/2017 8.7 8.6 - 10.5 mg/dL Final   01/20/2017 8.9 8.6 - 10.5 mg/dL Final   01/19/2017 8.5 (L) 8.6 - 10.5 mg/dL Final      Magnesium No results found for: MG         aspirin 81 mg Oral Daily   atorvastatin 40 mg Oral Nightly   budesonide-formoterol 2 puff Inhalation BID - RT   enoxaparin 40 mg Subcutaneous Daily   guaiFENesin 1,200 mg Oral BID   insulin aspart 0-9 Units Subcutaneous 4x Daily AC & at Bedtime   metoprolol tartrate 12.5 mg Oral Q12H   mupirocin 1 application Each  Nare Daily   pantoprazole 40 mg Oral BID AC   sennosides-docusate sodium 2 tablet Oral Nightly   tiotropium 1 capsule Inhalation Daily - RT              Patient Active Problem List   Diagnosis Code   • 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   • Essential hypertension I10   • Renal cyst N28.1   • Acute posthemorrhagic anemia D62   • Chronic coronary artery disease I25.10   • Abnormal result of cardiovascular function study R94.30   • Cholelithiasis K80.20   • Chronic obstructive bronchitis J44.9   • Current smoker F17.200   • AAA (abdominal aortic aneurysm) I71.4   • Angina of effort I20.8   • Coronary artery disease involving native coronary artery of native heart I25.10       Assessment & Plan     -Severe multivessel CAD  -NL LV systolic function, EF 64%  -POD#4 CABG x 4 w/LIMA & AVR (pericardial)  -Expected post op acute blood loss anemia, stable  -Thrombocytopenia, stable  -HTN-stable  -COPD->1/12/17 PFT's showed severe airway obstruction -> pulmonary following  -Persistent tobacco abuse  -AAA  -PAD, s/p stent to lower extremity  -Mildly elevated creatinine, improved; good urine output, will monitor  -Post op PAF, QTC prolonged with amio>d'cd>coumadin started 1/20, currently SR no more PAF last 24 hours, will dose coumadin daily  -Leukocytosis without fever, improved today -> continue to monitor  -2mm left apical pneumothorax - CT in place - still with air leak, will recheck CXR in am          Ebony Locke, PASTOR  01/21/17  10:09 AM

## 2017-01-21 NOTE — CONSULTS
[facility]  Cardiology Consult Note    Patient Identification:  Name: Conor Faustin  Age: 71 y.o.  Sex: male  :  1945  MRN: [id number]             Requesting Physician: Dr. Molina    Reason for Consultation / Chief Complaint: Postop follow-up    History of Present Illness:   71-year-old male who is day 2 status post four-vessel CABG, and aortic valve repair. The patient complains of shortness of breath with exertion. This has been present for several years. It is still present. Now, after cardiac surgery, he is on 2-3 L of oxygen to keep oxygen saturation greater than 90%. He is a heavy smoker. He has smoked a pack a day for 60 years. He has a daily cough. He has some sputum, but he does not have the ability to cough it up. He denies any fever or chills. Right now he is complaining of pain in the chest, at surgical site. He currently still has chest tubes in place after cardiac surgery.    Comorbid cardiac risk factors:     Past Medical History:  Past Medical History   Diagnosis Date   • AAA (abdominal aortic aneurysm)    • Benign essential hypertension    • COPD (chronic obstructive pulmonary disease)    • Coronary artery disease    • Renal cyst      RIGHT   • Smoker      1 PPD     Past Surgical History:  Past Surgical History   Procedure Laterality Date   • Cardiac catheterization     • Peripheral arterial stent graft     • Coronary angioplasty     • Cardiac catheterization N/A 2016     Procedure: Left Heart Cath;  Surgeon: Irena Hale MD;  Location: [facility] INVASIVE LOCATION;  Service:    • Cardiac catheterization     • Coronary artery bypass graft N/A 2017     Procedure: INTRAOPERATIVE THIAGO, MIDLINE STERNOTOMY, CORONARY ARTERY BYPASS GRAFTING X 4 UTILIZING LEFT INTERNAL MAMMARY ARTERY AND RIGHT ENDOSCOPICALLY HARVESTED GREATER SAPHENOUS VEIN, AORTIC VALVE REPLACEMENT;  Surgeon: Jassi De Jesus MD;  Location: [facility] OR;  Service:       Allergies:  Allergies    Allergen Reactions   • Bisoprolol Angioedema   • Clarithromycin Swelling     Home Meds:  Prescriptions Prior to Admission   Medication Sig Dispense Refill Last Dose   • aspirin 325 MG tablet Take 325 mg by mouth Daily. PREOP INSTR GIVEN   1/10/2017   • clopidogrel (PLAVIX) 75 MG tablet Take 75 mg by mouth. HOLD PER MD INSTR- STOPPED 1/10/2017   1/10/2017   • Coenzyme Q10 (COQ10) 100 MG capsule Take 400 mg by mouth Daily.   1/10/2017   • metoprolol tartrate (LOPRESSOR) 25 MG tablet Take 25 mg by mouth Daily.   2 months   • MULTIPLE VITAMINS ESSENTIAL PO Take  by mouth.   1/16/2017 at 1400   • nitroglycerin (NITROSTAT) 0.4 MG SL tablet Place 0.4 mg under the tongue Every 5 (Five) Minutes As Needed for chest pain. Take no more than 3 doses in 15 minutes.   1/13/2017   • chlorhexidine (PERIDEX) 0.12 % solution Apply 15 mL to the mouth or throat 2 (Two) Times a Day. PREOP INSTR GIVEN   1/17/2017 at 0300   • Chlorhexidine Gluconate Cloth 2 % pads Apply  topically. PREOP INSTR GIVEN   1/17/2017 at 0300   • mupirocin (BACTROBAN) 2 % nasal ointment into each nostril 2 (Two) Times a Day. PREOP INSTR GIVEN   1/17/2017 at 0300     Current Meds:   [unfilled]  Social History:   Social History   Substance Use Topics   • Smoking status: Current Every Day Smoker     Packs/day: 1.00     Years: 64.00   • Smokeless tobacco: Never Used   • Alcohol use No      Family History:  Family History   Problem Relation Age of Onset   • No Known Problems Mother    • No Known Problems Father    • No Known Problems Sister    • No Known Problems Brother    • No Known Problems Maternal Aunt    • No Known Problems Maternal Uncle    • No Known Problems Paternal Aunt    • No Known Problems Paternal Uncle    • No Known Problems Maternal Grandmother    • No Known Problems Maternal Grandfather    • Heart disease Paternal Grandmother    • Stroke Paternal Grandmother    • No Known Problems Paternal Grandfather         Review of Systems    Constitutional:  mild  fatigue    Eyes: No vision changes, eye pain   ENT/oropharynx: No difficulty swallowing, sore throat, epistaxis, changes in hearing   Cardiovascular:  complains of chest soreness but no angina    Respiratory:  mild shortness of breath much better    Gastrointestinal: No abdominal pain, nausea, vomiting, diarrhea, bloody stools   Genitourinary: No hematuria, dysuria   Neurological: No headache, tremors, numbness,  one-sided weakness    Musculoskeletal: No cramps, myalgias,  joint pain, joint swelling   Integument: No rash, edema           Constitutional:  Temp:  [97.8 °F (36.6 °C)-98.7 °F (37.1 °C)] 97.8 °F (36.6 °C)  Heart Rate:  [70-82] 71  Resp:  [16-20] 16  BP: (109-130)/(65-79) 125/65    Physical Exam by Irena Hale MD  General:  Appears in no acute distress  Eyes: PERTL,  HEENT:  No JVD. Thyroid not visibly enlarged. No mucosal pallor or cyanosis  Respiratory: Respirations regular and unlabored at rest. Equal chest expansion. BBS with good air entry in all fields. No crackles, rubs or wheezes auscultated  Cardiovascular: S1S2 Regular rate and rhythm. No murmur, rub or gallop auscultated. No carotid bruits. DP/PT pulses    . No pretibial pitting edema  Gastrointestinal: Abdomen soft, flat, non tender. Bowel sounds present. No hepatosplenomegaly. No ascites  Musculoskeletal: MANNING x4. No abnormal movements  Extremities: No digital clubbing or cyanosis  Skin: Color pink. Skin warm and dry to touch. No rashes  No xanthoma  Neuro: AAO x3 CN II-XII grossly intact          Cardiographics  ECG:     Telemetry:    Echocardiogram:     Imaging  Chest X-ray:     Lab Review         Results from last 7 days  Lab Units 01/18/17  0254   MAGNESIUM mg/dL 2.5*       Results from last 7 days  Lab Units 01/21/17  0431   SODIUM mmol/L 138   POTASSIUM mmol/L 4.1   BUN mg/dL 26*   CREATININE mg/dL 1.12   CALCIUM mg/dL 8.7     @[id number]@    Results from last 7 days  Lab Units 01/20/17  0336 01/19/17  0402 01/18/17  0254    WBC 10*3/mm3 13.55* 14.27* 10.89*   HEMOGLOBIN g/dL 14.1 13.1* 12.1*   HEMATOCRIT % 43.2 42.7 39.2*   PLATELETS 10*3/mm3 113* 106* 109*       Results from last 7 days  Lab Units 01/21/17  0431 01/20/17  1829 01/18/17  0254 01/17/17  1235 01/17/17  1051   INR  0.99 1.02 1.23* 1.50* 1.64*   APTT seconds  --   --   --  39.5* 32.9     · Successful left heart catheter  · 100% LAD, filling distally with colorectal circulation, origin of the diagonal 50% stenosis  · Nondominant with origin of the OM 90%  · Dominant RCA with midportion 90% stenosis  · Normal LV gram    Assessment:  Hypoxemia  Severe COPD  Status post urinary artery bypass  Status post aortic valve replacement  Heavy smoker  Acute kidney injury    Recommendations / Plan:     Postop beta blockers  EKG in the morning  Statins to follow  Anticoagulation  EKG and BMP in the morning  I reviewed the patient's clinical results, medications and treatment plan  I personally viewed and interpreted the patient's EKG/Telemetry data    Irena Hale MD  1/21/2017, 12:30 PM      EMR Dragon/Transcription disclaimer:   Much of this encounter note is an electronic transcription/translation of spoken language to printed text. The electronic translation of spoken language may permit erroneous, or at times, nonsensical words or phrases to be inadvertently transcribed; Although I have reviewed the note for such errors, some may still exist.

## 2017-01-21 NOTE — PLAN OF CARE
Problem: Patient Care Overview (Adult)  Goal: Plan of Care Review  Outcome: Ongoing (interventions implemented as appropriate)    01/21/17 0459   Coping/Psychosocial Response Interventions   Plan Of Care Reviewed With patient   Patient Care Overview   Progress progress toward functional goals as expected   Outcome Evaluation   Outcome Summary/Follow up Plan pt remained in sinus rhythm most of the night. Pain controlled. vital signs stable. chest tube draining minimally.         Problem: Cardiac Surgery (Adult)  Goal: Signs and Symptoms of Listed Potential Problems Will be Absent or Manageable (Cardiac Surgery)  Outcome: Ongoing (interventions implemented as appropriate)    01/21/17 0459   Cardiac Surgery   Problems Assessed (Cardiac Surgery) all   Problems Present (Cardiac Surgery) pain;dysrhythmia/arrhythmia         Problem: Fall Risk (Adult)  Goal: Absence of Falls  Outcome: Ongoing (interventions implemented as appropriate)    01/21/17 0459   Fall Risk (Adult)   Absence of Falls making progress toward outcome

## 2017-01-21 NOTE — PLAN OF CARE
Problem: Patient Care Overview (Adult)  Goal: Plan of Care Review    01/21/17 0944   Coping/Psychosocial Response Interventions   Plan Of Care Reviewed With patient   Outcome Evaluation   Outcome Summary/Follow up Plan Progress towards goals during therapy.

## 2017-01-21 NOTE — NURSING NOTE
Spoke with Dr. De Jesus regarding patient's CXR results. Orders noted to leave CT on water seal and repeat CXR in the morning.

## 2017-01-21 NOTE — PROGRESS NOTES
"Acute Care - Physical Therapy Treatment Note  [facility]     Patient Name: Conor Faustin  : 1945  MRN: [id number]  Today's Date: 2017  Onset of Illness/Injury or Date of Surgery Date: 17     Referring Physician: Liza    Admit Date: 2017    Visit Dx:    ICD-10-CM ICD-9-CM   1. Generalized weakness R53.1 780.79   2. Coronary artery disease involving native coronary artery of native heart, angina presence unspecified I25.10 414.01     Patient Active Problem List   Diagnosis   • Aneurysm of aortic arch   • Essential hypertension   • Renal cyst   • Acute posthemorrhagic anemia   • Chronic coronary artery disease   • Abnormal result of cardiovascular function study   • Cholelithiasis   • Chronic obstructive bronchitis   • Current smoker   • AAA (abdominal aortic aneurysm)   • Angina of effort   • Coronary artery disease involving native coronary artery of native heart               Adult Rehabilitation Note       17 0943 17 0932 17 1007    Rehab Assessment/Intervention    Discipline physical therapist  -AR physical therapist  -EM physical therapist  -EM    Document Type therapy note (daily note)  -AR therapy note (daily note)  -EM therapy note (daily note)  -EM    Subjective Information agree to therapy  -AR agree to therapy   c/o \"gas pain\"   -EM agree to therapy;complains of;pain  -EM    Patient Effort, Rehab Treatment good  -AR      Precautions/Limitations fall precautions;sternal precautions  -AR cardiac precautions;sternal precautions  -EM cardiac precautions;sternal precautions  -EM    Recorded by [AR] Penelope Leblanc, PT [EM] Cristy Garcia, PT [EM] Cristy Garcia, PT    Vital Signs    Pre Systolic BP Rehab  117  -  -EM    Pre Treatment Diastolic BP  76  -EM 64  -EM    Pretreatment Heart Rate (beats/min)  90  -EM 80  -EM    Posttreatment Heart Rate (beats/min)  106  -EM 97  -EM    Pre SpO2 (%)  94  -EM 93  -EM    O2 Delivery Pre Treatment  " "supplemental O2  -EM supplemental O2  -EM    Post SpO2 (%)   93  -EM    O2 Delivery Post Treatment   supplemental O2  -EM    Recorded by  [EM] Cristy Garcia, PT [EM] Cristy Garcia, PT    Pain Assessment    Pain Assessment No/denies pain  -AR 0-10  -EM 0-10  -EM    Pain Score  3  -EM 5  -EM    Pain Location  Abdomen   \"gas pain\"   -EM Chest  -EM    Pain Intervention(s)  Repositioned;Ambulation/increased activity  -EM Medication (See MAR)  -EM    Recorded by [AR] Penelope Leblanc, PT [EM] Cristy Garcia, PT [EM] Cristy Garcia, PT    Cognitive Assessment/Intervention    Current Cognitive/Communication Assessment functional  -AR functional  -EM     Orientation Status oriented x 4  -AR      Follows Commands/Answers Questions 100% of the time  -AR      Recorded by [AR] Penelope Leblanc, PT [EM] Cristy Garcia, PT     Bed Mobility, Assessment/Treatment    Bed Mob, Supine to Sit, Donnel[place] not tested  -AR      Bed Mob, Sit to Supine, [place] not tested  -AR      Recorded by [AR] Penelope Leblanc PT      Transfer Assessment/Treatment    Transfers, Sit-Stand Donnel[place] stand by assist  -AR contact guard assist  -EM minimum assist (75% patient effort);verbal cues required  -EM    Transfers, Stand-Sit [place]nel[place] stand by assist  -AR contact guard assist  -EM     Toilet Transfer, [place]nel[place] contact guard assist  -AR      Recorded by [AR] Penelope Leblanc, PT [EM] Cristy Garcia, PT [EM] Cristy Garcia, PT    Gait Assessment/Treatment    Gait, Donnelly Level stand by assist;contact guard assist  -AR contact guard assist;1 person + 1 person to manage equipment  -EM minimum assist (75% patient effort);1 person + 1 person to manage equipment;hand held assist;verbal cues required  -EM    Gait, Assistive Device --   no UE support  -AR      Gait, Distance (Feet) 460  -  -EM 75  -EM    Gait, Gait Deviations decreased heel strike;mary decreased  -AR mary decreased;forward " flexed posture  -EM step length decreased;forward flexed posture;mary decreased  -EM    Gait, Safety Issues supplemental O2  -AR      Recorded by [AR] Penelope Leblanc, PT [EM] Cristy Garcia, PT [EM] Cristy Garcia, PT    Stairs Assessment/Treatment    Stairs, Comment declined 2/2 toileting  -AR      Recorded by [AR] Penelope Leblanc PT      Therapy Exercises    Exercise Protocols  --   10 reps cardiac protocol, Level 3   -EM --   5 reps cardiac protocol, Level 3   -EM    Recorded by  [EM] Cristy Garcia, PT [EM] Cristy Garcia, PT    Positioning and Restraints    Pre-Treatment Position sitting in chair/recliner  -AR sitting in chair/recliner  -EM sitting in chair/recliner  -EM    Post Treatment Position bathroom  -AR chair  -EM chair  -EM    In Chair --  -AR reclined;call light within reach;with family/caregiver  -EM reclined;call light within reach;with family/caregiver  -EM    Bathroom sitting;call light within reach;encouraged to call for assist;with family/caregiver  -AR      Recorded by [AR] Penelope Leblanc, PT [EM] Cristy Garcia, PT [EM] Cristy Garcia, PT      User Key  (r) = Recorded By, (t) = Taken By, (c) = Cosigned By    Initials Name Effective Dates    EM Cristy Garcia, PT 12/01/15 -     AR Penelope Leblanc PT 06/27/16 -                 IP PT Goals       01/18/17 0939          Cardiopulmonary PT LTG    Cardiopulmonary PT LTG, Date Established 01/18/17  -EM      Cardiopulmonary PT LTG, Time to Achieve 1 wk  -EM      Cardiopulmonary PT LTG, Level Level V  -EM        User Key  (r) = Recorded By, (t) = Taken By, (c) = Cosigned By    Initials Name Provider Type    ARPIT Garcia, PT Physical Therapist          Physical Therapy Education     Title: PT OT SLP Therapies (Done)     Topic: Physical Therapy (Done)     Point: Mobility training (Done)    Learning Progress Summary    Learner Readiness Method Response Comment Documented by Status   Patient Acceptance E  VU  AR 01/21/17 0945 Done    Acceptance E NR  EM 01/20/17 0935 Active    Acceptance E NR  EM 01/19/17 1009 Active    Acceptance E NR  EM 01/18/17 0939 Active               Point: Home exercise program (Done)    Learning Progress Summary    Learner Readiness Method Response Comment Documented by Status   Patient Acceptance E   AR 01/21/17 0945 Done    Acceptance E NR  EM 01/20/17 0935 Active    Acceptance E NR  EM 01/19/17 1009 Active    Acceptance E NR  EM 01/18/17 0939 Active               Point: Body mechanics (Done)    Learning Progress Summary    Learner Readiness Method Response Comment Documented by Status   Patient Acceptance E [facility] 01/21/17 0945 Done               Point: Precautions (Done)    Learning Progress Summary    Learner Readiness Method Response Comment Documented by Status   Patient Acceptance E [facility] 01/21/17 0945 Done                      User Key     Initials Effective Dates Name Provider Type Discipline    EM 12/01/15 -  Cristy Garcia, PT Physical Therapist PT    AR 06/27/16 -  Penelope Leblanc PT Physical Therapist PT                    PT Recommendation and Plan  Anticipated Discharge Disposition: home with assist  Planned Therapy Interventions: bed mobility training, gait training, home exercise program  PT Frequency: daily  Plan of Care Review  Plan Of Care Reviewed With: patient  Progress: progress toward functional goals as expected  Outcome Summary/Follow up Plan: Progress towards goals during therapy.            Outcome Measures       01/21/17 0900 01/20/17 0900 01/19/17 1000    How much help from another person do you currently need...    Turning from your back to your side while in flat bed without using bedrails? 4  -AR 3  -EM 3  -EM    Moving from lying on back to sitting on the side of a flat bed without bedrails? 4  -AR 3  -EM 3  -EM    Moving to and from a bed to a chair (including a wheelchair)? 3  -AR 3  -EM 3  -EM    Standing up from a chair using your arms (e.g.,  wheelchair, bedside chair)? 3  -AR 3  -EM 3  -EM    Climbing 3-5 steps with a railing? 3  -AR 3  -EM 2  -EM    To walk in hospital room? 3  -AR 3  -EM 3  -EM    AM-PAC 6 Clicks Score 20  -AR 18  -EM 17  -EM      User Key  (r) = Recorded By, (t) = Taken By, (c) = Cosigned By    Initials Name Provider Type    EM Cristy Garcia, PT Physical Therapist    AR Penelope Leblanc PT Physical Therapist           Time Calculation:         PT Charges       01/21/17 0945          Time Calculation    Start Time 0930  -AR      Stop Time 0945  -AR      Time Calculation (min) 15 min  -AR      PT Received On 01/21/17  -AR      PT - Next Appointment 01/22/17  -AR        User Key  (r) = Recorded By, (t) = Taken By, (c) = Cosigned By    Initials Name Provider Type    GEOR[place]IA Leblanc PT Physical Therapist          Therapy Charges for Today     Code Description Service Date Service Provider Modifiers Qty    90136917999 HC PT THER PROC EA 15 MIN 1/21/2017 Penelope Leblanc PT GP 1          PT G-Codes  Outcome Measure Options: AM-PAC 6 Clicks Basic Mobility (PT)    Penelope Leblanc PT  1/21/2017

## 2017-01-21 NOTE — PLAN OF CARE
Problem: Patient Care Overview (Adult)  Goal: Plan of Care Review  Outcome: Ongoing (interventions implemented as appropriate)    01/21/17 1830   Coping/Psychosocial Response Interventions   Plan Of Care Reviewed With patient;spouse   Patient Care Overview   Progress improving   Outcome Evaluation   Outcome Summary/Follow up Plan Pt did well today. Complaints of pain at incision site, medicated accordingly. Found nonblanchable area on Pt's buttox, applied cream and repositioned Pt off affected areas. Chest tube being monitored for small air leak.        Goal: Adult Individualization and Mutuality  Outcome: Ongoing (interventions implemented as appropriate)  Goal: Discharge Needs Assessment  Outcome: Ongoing (interventions implemented as appropriate)    Problem: Cardiac Surgery (Adult)  Goal: Signs and Symptoms of Listed Potential Problems Will be Absent or Manageable (Cardiac Surgery)  Outcome: Ongoing (interventions implemented as appropriate)    Problem: Fall Risk (Adult)  Goal: Absence of Falls  Outcome: Ongoing (interventions implemented as appropriate)    Problem: Skin Integrity Impairment, Risk/Actual (Adult)  Goal: Identify Related Risk Factors and Signs and Symptoms  Outcome: Ongoing (interventions implemented as appropriate)    01/21/17 1830   Skin Integrity Impairment, Risk/Actual   Skin Integrity Impairment, Risk/Actual: Related Risk Factors immobility   Signs and Symptoms (Skin Integrity Impairment) erythema nonblanchable       Goal: Skin Integrity/Wound Healing  Outcome: Ongoing (interventions implemented as appropriate)

## 2017-01-22 ENCOUNTER — APPOINTMENT (OUTPATIENT)
Dept: GENERAL RADIOLOGY | Facility: HOSPITAL | Age: 72
End: 2017-01-22

## 2017-01-22 LAB
ANION GAP SERPL CALCULATED.3IONS-SCNC: 9.2 MMOL/L
BUN BLD-MCNC: 23 MG/DL (ref 8–23)
BUN/CREAT SERPL: 20.7 (ref 7–25)
CALCIUM SPEC-SCNC: 8.3 MG/DL (ref 8.6–10.5)
CHLORIDE SERPL-SCNC: 99 MMOL/L (ref 98–107)
CO2 SERPL-SCNC: 28.8 MMOL/L (ref 22–29)
CREAT BLD-MCNC: 1.11 MG/DL (ref 0.76–1.27)
DEPRECATED RDW RBC AUTO: 45.2 FL (ref 37–54)
ERYTHROCYTE [DISTWIDTH] IN BLOOD BY AUTOMATED COUNT: 13.5 % (ref 11.5–14.5)
GFR SERPL CREATININE-BSD FRML MDRD: 65 ML/MIN/1.73
GLUCOSE BLD-MCNC: 129 MG/DL (ref 65–99)
GLUCOSE BLDC GLUCOMTR-MCNC: 119 MG/DL (ref 70–130)
GLUCOSE BLDC GLUCOMTR-MCNC: 136 MG/DL (ref 70–130)
GLUCOSE BLDC GLUCOMTR-MCNC: 153 MG/DL (ref 70–130)
GLUCOSE BLDC GLUCOMTR-MCNC: 177 MG/DL (ref 70–130)
HCT VFR BLD AUTO: 41.5 % (ref 40.4–52.2)
HGB BLD-MCNC: 13.5 G/DL (ref 13.7–17.6)
INR PPP: 1.17 (ref 0.9–1.1)
MCH RBC QN AUTO: 29.8 PG (ref 27–32.7)
MCHC RBC AUTO-ENTMCNC: 32.5 G/DL (ref 32.6–36.4)
MCV RBC AUTO: 91.6 FL (ref 79.8–96.2)
PLATELET # BLD AUTO: 166 10*3/MM3 (ref 140–500)
PMV BLD AUTO: 9.7 FL (ref 6–12)
POTASSIUM BLD-SCNC: 3.6 MMOL/L (ref 3.5–5.2)
POTASSIUM BLD-SCNC: 4 MMOL/L (ref 3.5–5.2)
PROTHROMBIN TIME: 14.5 SECONDS (ref 11.7–14.2)
RBC # BLD AUTO: 4.53 10*6/MM3 (ref 4.6–6)
SODIUM BLD-SCNC: 137 MMOL/L (ref 136–145)
WBC NRBC COR # BLD: 11.01 10*3/MM3 (ref 4.5–10.7)

## 2017-01-22 PROCEDURE — 80048 BASIC METABOLIC PNL TOTAL CA: CPT | Performed by: NURSE PRACTITIONER

## 2017-01-22 PROCEDURE — 93005 ELECTROCARDIOGRAM TRACING: CPT | Performed by: THORACIC SURGERY (CARDIOTHORACIC VASCULAR SURGERY)

## 2017-01-22 PROCEDURE — 99024 POSTOP FOLLOW-UP VISIT: CPT | Performed by: THORACIC SURGERY (CARDIOTHORACIC VASCULAR SURGERY)

## 2017-01-22 PROCEDURE — 99231 SBSQ HOSP IP/OBS SF/LOW 25: CPT | Performed by: INTERNAL MEDICINE

## 2017-01-22 PROCEDURE — 94640 AIRWAY INHALATION TREATMENT: CPT

## 2017-01-22 PROCEDURE — 84132 ASSAY OF SERUM POTASSIUM: CPT | Performed by: THORACIC SURGERY (CARDIOTHORACIC VASCULAR SURGERY)

## 2017-01-22 PROCEDURE — 85610 PROTHROMBIN TIME: CPT | Performed by: NURSE PRACTITIONER

## 2017-01-22 PROCEDURE — 97110 THERAPEUTIC EXERCISES: CPT

## 2017-01-22 PROCEDURE — 93010 ELECTROCARDIOGRAM REPORT: CPT | Performed by: INTERNAL MEDICINE

## 2017-01-22 PROCEDURE — 71010 HC CHEST PA OR AP: CPT

## 2017-01-22 PROCEDURE — 85027 COMPLETE CBC AUTOMATED: CPT | Performed by: THORACIC SURGERY (CARDIOTHORACIC VASCULAR SURGERY)

## 2017-01-22 PROCEDURE — 25010000002 ENOXAPARIN PER 10 MG: Performed by: THORACIC SURGERY (CARDIOTHORACIC VASCULAR SURGERY)

## 2017-01-22 PROCEDURE — 63710000001 INSULIN ASPART PER 5 UNITS: Performed by: NURSE PRACTITIONER

## 2017-01-22 PROCEDURE — 82962 GLUCOSE BLOOD TEST: CPT

## 2017-01-22 RX ORDER — POTASSIUM CHLORIDE 1.5 G/1.77G
20 POWDER, FOR SOLUTION ORAL AS NEEDED
Status: DISCONTINUED | OUTPATIENT
Start: 2017-01-22 | End: 2017-01-25 | Stop reason: HOSPADM

## 2017-01-22 RX ORDER — POTASSIUM CHLORIDE 750 MG/1
20 CAPSULE, EXTENDED RELEASE ORAL AS NEEDED
Status: DISCONTINUED | OUTPATIENT
Start: 2017-01-22 | End: 2017-01-25 | Stop reason: HOSPADM

## 2017-01-22 RX ADMIN — HYDROCODONE BITARTRATE AND ACETAMINOPHEN 2 TABLET: 5; 325 TABLET ORAL at 16:12

## 2017-01-22 RX ADMIN — TIOTROPIUM BROMIDE 1 CAPSULE: 18 CAPSULE ORAL; RESPIRATORY (INHALATION) at 08:02

## 2017-01-22 RX ADMIN — METOPROLOL TARTRATE 25 MG: 25 TABLET ORAL at 09:46

## 2017-01-22 RX ADMIN — METOPROLOL TARTRATE 25 MG: 25 TABLET ORAL at 23:08

## 2017-01-22 RX ADMIN — POTASSIUM CHLORIDE 20 MEQ: 750 CAPSULE, EXTENDED RELEASE ORAL at 06:52

## 2017-01-22 RX ADMIN — DOCUSATE SODIUM -SENNOSIDES 2 TABLET: 50; 8.6 TABLET, COATED ORAL at 23:09

## 2017-01-22 RX ADMIN — GUAIFENESIN 1200 MG: 600 TABLET, EXTENDED RELEASE ORAL at 17:06

## 2017-01-22 RX ADMIN — ATORVASTATIN CALCIUM 40 MG: 40 TABLET, FILM COATED ORAL at 22:48

## 2017-01-22 RX ADMIN — BUDESONIDE AND FORMOTEROL FUMARATE DIHYDRATE 2 PUFF: 160; 4.5 AEROSOL RESPIRATORY (INHALATION) at 08:02

## 2017-01-22 RX ADMIN — MUPIROCIN 1 APPLICATION: 20 OINTMENT TOPICAL at 09:47

## 2017-01-22 RX ADMIN — INSULIN ASPART 2 UNITS: 100 INJECTION, SOLUTION INTRAVENOUS; SUBCUTANEOUS at 16:11

## 2017-01-22 RX ADMIN — HYDROCODONE BITARTRATE AND ACETAMINOPHEN 2 TABLET: 5; 325 TABLET ORAL at 03:58

## 2017-01-22 RX ADMIN — POTASSIUM CHLORIDE 20 MEQ: 750 CAPSULE, EXTENDED RELEASE ORAL at 11:14

## 2017-01-22 RX ADMIN — PANTOPRAZOLE SODIUM 40 MG: 40 TABLET, DELAYED RELEASE ORAL at 17:06

## 2017-01-22 RX ADMIN — BUDESONIDE AND FORMOTEROL FUMARATE DIHYDRATE 2 PUFF: 160; 4.5 AEROSOL RESPIRATORY (INHALATION) at 20:26

## 2017-01-22 RX ADMIN — ENOXAPARIN SODIUM 40 MG: 40 INJECTION SUBCUTANEOUS at 11:14

## 2017-01-22 RX ADMIN — ASPIRIN 81 MG: 81 TABLET ORAL at 09:47

## 2017-01-22 RX ADMIN — PANTOPRAZOLE SODIUM 40 MG: 40 TABLET, DELAYED RELEASE ORAL at 06:52

## 2017-01-22 RX ADMIN — INSULIN ASPART 2 UNITS: 100 INJECTION, SOLUTION INTRAVENOUS; SUBCUTANEOUS at 11:14

## 2017-01-22 RX ADMIN — GUAIFENESIN 1200 MG: 600 TABLET, EXTENDED RELEASE ORAL at 09:46

## 2017-01-22 NOTE — NURSING NOTE
Pt chest XR results came back. Pnuemo grew from 10mm to 12mm overnight. Contacted Dr De Jesus and he recommended placing chest tube back on suction and reevaluate tomorrow morning.

## 2017-01-22 NOTE — PROGRESS NOTES
POD # 4 CABG  VSS  Off o2  lungs decreased in bases , cor reg, incisions clean  Will clamp CT if no worsening of space, then D/C

## 2017-01-22 NOTE — THERAPY DISCHARGE NOTE
Acute Care - Physical Therapy Treatment Note/Discharge  [facility]     Patient Name: Conor Faustin  : 1945  MRN: [id number]  Today's Date: 2017  Onset of Illness/Injury or Date of Surgery Date: 17     Referring Physician: Liza    Admit Date: 2017    Visit Dx:    ICD-10-CM ICD-9-CM   1. Generalized weakness R53.1 780.79   2. Coronary artery disease involving native coronary artery of native heart, angina presence unspecified I25.10 414.01     Patient Active Problem List   Diagnosis   • Aneurysm of aortic arch   • Essential hypertension   • Renal cyst   • Acute posthemorrhagic anemia   • Chronic coronary artery disease   • Abnormal result of cardiovascular function study   • Cholelithiasis   • Chronic obstructive bronchitis   • Current smoker   • AAA (abdominal aortic aneurysm)   • Angina of effort   • Coronary artery disease involving native coronary artery of native heart       Physical Therapy Education     Title: PT OT SLP Therapies (Resolved)     Topic: Physical Therapy (Resolved)     Point: Mobility training (Resolved)    Learning Progress Summary    Learner Readiness Method Response Comment Documented by Status   Patient Acceptance E VU  AR 17 0904 Done    Acceptance E VU  AR 17 0945 Done    Acceptance E NR  EM 17 0935 Active    Acceptance E NR  EM 17 1009 Active    Acceptance E NR  EM 17 0939 Active               Point: Home exercise program (Resolved)    Learning Progress Summary    Learner Readiness Method Response Comment Documented by Status   Patient Acceptance E VU  AR 17 0904 Done    Acceptance E VU  AR 17 0945 Done    Acceptance E NR   17 0935 Active    Acceptance E NR   17 1009 Active    Acceptance E NR   17 0939 Active               Point: Body mechanics (Resolved)    Learning Progress Summary    Learner Readiness Method Response Comment Documented by Status   Patient Acceptance E VU  AR 17 0904  "Done    Acceptance E VU  AR 01/21/17 0945 Done               Point: Precautions (Resolved)    Learning Progress Summary    Learner Readiness Method Response Comment Documented by Status   Patient Acceptance E   AR 01/22/17 0904 Done    Acceptance E [facility] 01/21/17 0945 Done                      User Key     Initials Effective Dates Name Provider Type Discipline    EM 12/01/15 -  Cristy Garcia, PT Physical Therapist PT    AR 06/27/16 -  Penelope Leblanc PT Physical Therapist PT                    IP PT Goals       01/22/17 0901 01/18/17 0939       Cardiopulmonary PT LTG    Cardiopulmonary PT LTG, Date Established  01/18/17  -EM     Cardiopulmonary PT LTG, Time to Achieve  1 wk  -EM     Cardiopulmonary PT LTG, Level  Level V  -EM     Cardiopulmonary PT LTG, Date Goal Reviewed 01/22/17  -AR      Cardiopulmonary PT LTG, Outcome goal met  -AR        User Key  (r) = Recorded By, (t) = Taken By, (c) = Cosigned By    Initials Name Provider Type    EM Cristy Garcia, PT Physical Therapist    AR Penelope Leblanc, PT Physical Therapist              Adult Rehabilitation Note       01/22/17 0856 01/21/17 0943 01/20/17 0932    Rehab Assessment/Intervention    Discipline physical therapist  -AR physical therapist  -AR physical therapist  -EM    Document Type therapy note (daily note);discharge summary  -AR therapy note (daily note)  -AR therapy note (daily note)  -EM    Subjective Information agree to therapy  -AR agree to therapy  -AR agree to therapy   c/o \"gas pain\"   -EM    Patient Effort, Rehab Treatment good  -AR good  -AR     Precautions/Limitations sternal precautions  -AR fall precautions;sternal precautions  -AR cardiac precautions;sternal precautions  -EM    Recorded by [AR] Penelope Leblanc, PT [AR] Penelope Leblanc, PT [EM] Cristy Garcia, PT    Vital Signs    Pre Systolic BP Rehab   117  -EM    Pre Treatment Diastolic BP   76  -EM    Pretreatment Heart Rate (beats/min)   90  -EM    Posttreatment Heart " "Rate (beats/min)   106  -EM    Pre SpO2 (%)   94  -EM    O2 Delivery Pre Treatment room air  -AR  supplemental O2  -EM    Recorded by [AR] Penelope Leblanc, PT  [EM] Cristy Garcia, PT    Pain Assessment    Pain Assessment No/denies pain  -AR No/denies pain  -AR 0-10  -EM    Pain Score   3  -EM    Pain Location   Abdomen   \"gas pain\"   -EM    Pain Intervention(s)   Repositioned;Ambulation/increased activity  -EM    Recorded by [AR] Penelope Leblanc PT [AR] Penelope Leblanc, PT [EM] Cristy Garcia, PT    Cognitive Assessment/Intervention    Current Cognitive/Communication Assessment functional  -AR functional  -AR functional  -EM    Orientation Status oriented x 4  -AR oriented x 4  -AR     Follows Commands/Answers Questions 100% of the time  -% of the time  -AR     Personal Safety WNL/WFL  -AR      Recorded by [AR] Penelope Leblanc PT [AR] Penelope Leblanc, PT [EM] Cristy Garcia, PT    Bed Mobility, Assessment/Treatment    Bed Mob, Supine to Sit, [place] not tested  -AR not tested  -AR     Bed Mob, Sit to Supine, [place] not tested  -AR not tested  -AR     Recorded by [AR] Penelope Leblanc, PT [AR] Penelope Leblanc, PT     Transfer Assessment/Treatment    Transfers, Sit-Stand [place] conditional independence  -AR stand by assist  -AR contact guard assist  -EM    Transfers, Stand-Sit [place] conditional independence  -AR stand by assist  -AR contact guard assist  -EM    Toilet Transfer, [place]  contact guard assist  -AR     Recorded by [AR] Penelope Leblanc PT [AR] Penelope Leblanc, PT [EM] Cristy Garcia, PT    Gait Assessment/Treatment    Gait, [place] Level stand by assist  -AR stand by assist;contact guard assist  -AR contact guard assist;1 person + 1 person to manage equipment  -EM    Gait, Assistive Device  --   no UE support  -AR     Gait, Distance (Feet) 300  -  -  -EM    Gait, Gait Deviations mary decreased;decreased heel strike  -AR " decreased heel strike;mary decreased  -AR mary decreased;forward flexed posture  -EM    Gait, Safety Issues  supplemental O2  -AR     Recorded by [AR] Penelope Leblanc, PT [AR] Penelope Leblanc PT [EM] Cristy Garcia, PT    Stairs Assessment/Treatment    Number of Stairs 8  -AR      Stairs, Handrail Location right side (ascending)  -AR      Stairs, [place] Level contact guard assist  -AR      Stairs, Technique Used step to step (ascending);step to step (descending)  -AR      Stairs, Comment  declined 2/2 toileting  -AR     Recorded by [AR] Penelope Leblanc, PT [AR] Penelope Leblanc PT     Therapy Exercises    Exercise Protocols --   10 reps cardiac ex.   -AR  --   10 reps cardiac protocol, Level 3   -EM    Recorded by [AR] Penelope Leblanc PT  [EM] Cristy Garcia, PT    Positioning and Restraints    Pre-Treatment Position sitting in chair/recliner  -AR sitting in chair/recliner  -AR sitting in chair/recliner  -EM    Post Treatment Position chair  -AR bathroom  -AR chair  -EM    In Chair sitting;reclined;call light within reach  -AR --  -AR reclined;call light within reach;with family/caregiver  -EM    Bathroom  sitting;call light within reach;encouraged to call for assist;with family/caregiver  -AR     Recorded by [AR] Penelope Leblanc PT [AR] Penelope Leblanc PT [EM] Cristy Garcia, PT      01/19/17 [facility]          Rehab Assessment/Intervention    Discipline physical therapist  -EM      Document Type therapy note (daily note)  -EM      Subjective Information agree to therapy;complains of;pain  -EM      Precautions/Limitations cardiac precautions;sternal precautions  -EM      Recorded by [EM] Cristy Garcia, PT      Vital Signs    Pre Systolic BP Rehab 125  -EM      Pre Treatment Diastolic BP 64  -EM      Pretreatment Heart Rate (beats/min) 80  -EM      Posttreatment Heart Rate (beats/min) 97  -EM      Pre SpO2 (%) 93  -EM      O2 Delivery Pre Treatment supplemental O2  -EM      Post SpO2  (%) 93  -EM      O2 Delivery Post Treatment supplemental O2  -EM      Recorded by [EM] Cristy Garcia, PT      Pain Assessment    Pain Assessment 0-10  -EM      Pain Score 5  -EM      Pain Location Chest  -EM      Pain Intervention(s) Medication (See MAR)  -EM      Recorded by [EM] Cristy Garcia, PT      Transfer Assessment/Treatment    Transfers, Sit-Stand [place] minimum assist (75% patient effort);verbal cues required  -EM      Recorded by [EM] Cristy Garcia, PT      Gait Assessment/Treatment    Gait, [place] Level minimum assist (75% patient effort);1 person + 1 person to manage equipment;hand held assist;verbal cues required  -EM      Gait, Distance (Feet) 75  -EM      Gait, Gait Deviations step length decreased;forward flexed posture;mary decreased  -EM      Recorded by [EM] Cristy Garcia, PT      Therapy Exercises    Exercise Protocols --   5 reps cardiac protocol, Level 3   -EM      Recorded by [EM] Cristy Garcia, PT      Positioning and Restraints    Pre-Treatment Position sitting in chair/recliner  -EM      Post Treatment Position chair  -EM      In Chair reclined;call light within reach;with family/caregiver  -EM      Recorded by [EM] Cristy Garcia, PT        User Key  (r) = Recorded By, (t) = Taken By, (c) = Cosigned By    Initials Name Effective Dates    EM Cristy Garcia, PT 12/01/15 -     AR Penelope Leblanc, PT 06/27/16 -           PT Recommendation and Plan  Anticipated Discharge Disposition: home with assist  Planned Therapy Interventions: bed mobility training, gait training, home exercise program  PT Frequency: daily  Plan of Care Review  Plan Of Care Reviewed With: patient  Progress: improving  Outcome Summary/Follow up Plan: Ambulating in [place] w/o assistive device, able to climb 8 steps w/o diffculty.  DC PT, pt/spouse aware of activity recommendations and sternal precautions.            Outcome Measures       01/22/17 0900 01/21/17 0900  01/20/17 0900    How much help from another person do you currently need...    Turning from your back to your side while in flat bed without using bedrails? 4  -AR 4  -AR 3  -EM    Moving from lying on back to sitting on the side of a flat bed without bedrails? 4  -AR 4  -AR 3  -EM    Moving to and from a bed to a chair (including a wheelchair)? 4  -AR 3  -AR 3  -EM    Standing up from a chair using your arms (e.g., wheelchair, bedside chair)? 3  -AR 3  -AR 3  -EM    Climbing 3-5 steps with a railing? 3  -AR 3  -AR 3  -EM    To walk in hospital room? 3  -AR 3  -AR 3  -EM    AM-PAC 6 Clicks Score 21  -AR 20  -AR 18  -EM      01/19/17 1000          How much help from another person do you currently need...    Turning from your back to your side while in flat bed without using bedrails? 3  -EM      Moving from lying on back to sitting on the side of a flat bed without bedrails? 3  -EM      Moving to and from a bed to a chair (including a wheelchair)? 3  -EM      Standing up from a chair using your arms (e.g., wheelchair, bedside chair)? 3  -EM      Climbing 3-5 steps with a railing? 2  -EM      To walk in hospital room? 3  -EM      AM-PAC 6 Clicks Score 17  -EM        User Key  (r) = Recorded By, (t) = Taken By, (c) = Cosigned By    Initials Name Provider Type    EM Cristy Garcia, PT Physical Therapist    [place] Leblanc PT Physical Therapist           Time Calculation:         PT Charges       01/22/17 0905          Time Calculation    Start Time 0847  -AR      Stop Time 0905  -AR      Time Calculation (min) 18 min  -AR      PT Received On 01/22/17  -AR      PT - Next Appointment 01/17/17  -AR      PT Goal Re-Cert Due Date 01/17/17  -AR        User Key  (r) = Recorded By, (t) = Taken By, (c) = Cosigned By    Initials Name Provider Type    [place]OR[place]IA Leblanc PT Physical Therapist          Therapy Charges for Today     Code Description Service Date Service Provider Modifiers Qty    11932231650  PT THER PROC  EA 15 MIN 1/21/2017 Penelope Leblanc, PT GP 1    48483526699 HC PT THER PROC EA 15 MIN 1/22/2017 Penelope Leblanc PT GP 1          PT G-Codes  Outcome Measure Options: AM-PAC 6 Clicks Basic Mobility (PT)    PT Discharge Summary  Anticipated Discharge Disposition: home with assist  Reason for Discharge: All goals achieved  Outcomes Achieved: Able to achieve all goals within established timeline  Discharge Destination: Home with assist    Penelope Leblanc PT  1/22/2017

## 2017-01-22 NOTE — PROGRESS NOTES
LOS: 4 days   Patient Care Team:  Jonathan García MD as PCP - General (Internal Medicine)        Subjective: Patient notes he is doing very well the day a little chest soreness but otherwise no shortness of breath on room air no significant cough he's been working on his incentive spirometer and flutter valve        Objective     Vital Signs  Temp:  [97.8 °F (36.6 °C)-99 °F (37.2 °C)] 99 °F (37.2 °C)  Heart Rate:  [70-90] 90  Resp:  [16-20] 20  BP: (115-128)/(65-78) 128/78  Body mass index is 32.94 kg/(m^2).    Intake/Output Summary (Last 24 hours) at 01/21/17 2237  Last data filed at 01/21/17 1212   Gross per 24 hour   Intake    290 ml   Output    105 ml   Net    185 ml          Physical Exam:  General Appearance: White male resting comfortably in bed does not appear in any acute distress  Eyes: Conjunctiva clear and anicteric  ENT: His membranes are moist no erythema or exudates Mallampati type I airway, nasal septum midline  Neck: Adenopathy or thyromegaly no jugular venous distention trachea midline  Lungs: There are no hear any wheezes rales or rhonchi chest expansion is symmetric breath sounds are equal bilaterally sternotomy incision appears to be healing well.  Patient did his incentive spirometry to about 1600 ml.  Cardiac: Regular rhythm no murmur  Abdomen: Palpable organomegaly  : Not examined  Musc/Skel: Normal  Skin: No jaundice no petechiae  Neuro: Oriented cooperative grossly intact  Extremities/P Vascular: Tina or cyanosis trace ankle edema bilaterally palpable radial dorsalis pedis pulses  MSE: Be in pretty good spirits        Labs:  WBC No results found for: WBCS   HGB HEMOGLOBIN   Date Value Ref Range Status   01/20/2017 14.1 13.7 - 17.6 g/dL Final   01/19/2017 13.1 (L) 13.7 - 17.6 g/dL Final      HCT HEMATOCRIT   Date Value Ref Range Status   01/20/2017 43.2 40.4 - 52.2 % Final   01/19/2017 42.7 40.4 - 52.2 % Final      Platlets No results found for: LABPLAT     PT/INR:    PROTIME    Date Value Ref Range Status   01/21/2017 12.7 11.7 - 14.2 Seconds Final   01/20/2017 13.0 11.7 - 14.2 Seconds Final   /  INR   Date Value Ref Range Status   01/21/2017 0.99 0.90 - 1.10 Final   01/20/2017 1.02 0.90 - 1.10 Final       Sodium SODIUM   Date Value Ref Range Status   01/21/2017 138 136 - 145 mmol/L Final   01/20/2017 137 136 - 145 mmol/L Final   01/19/2017 139 136 - 145 mmol/L Final      Potassium POTASSIUM   Date Value Ref Range Status   01/21/2017 4.1 3.5 - 5.2 mmol/L Final   01/20/2017 4.1 3.5 - 5.2 mmol/L Final   01/19/2017 4.1 3.5 - 5.2 mmol/L Final      Chloride CHLORIDE   Date Value Ref Range Status   01/21/2017 99 98 - 107 mmol/L Final   01/20/2017 99 98 - 107 mmol/L Final   01/19/2017 100 98 - 107 mmol/L Final      Bicarbonate No results found for: PLASMABICARB   BUN BUN   Date Value Ref Range Status   01/21/2017 26 (H) 8 - 23 mg/dL Final   01/20/2017 21 8 - 23 mg/dL Final   01/19/2017 18 8 - 23 mg/dL Final      Creatinine CREATININE   Date Value Ref Range Status   01/21/2017 1.12 0.76 - 1.27 mg/dL Final   01/20/2017 1.03 0.76 - 1.27 mg/dL Final   01/19/2017 1.34 (H) 0.76 - 1.27 mg/dL Final      Calcium CALCIUM   Date Value Ref Range Status   01/21/2017 8.7 8.6 - 10.5 mg/dL Final   01/20/2017 8.9 8.6 - 10.5 mg/dL Final   01/19/2017 8.5 (L) 8.6 - 10.5 mg/dL Final      Magnesium No results found for: MG         pH No results found for: PHART   pO2 No results found for: PO2ART   pCO2 No results found for: LJF1UWH   HCO3 No results found for: TVJ4HGY       aspirin 81 mg Oral Daily   atorvastatin 40 mg Oral Nightly   budesonide-formoterol 2 puff Inhalation BID - RT   enoxaparin 40 mg Subcutaneous Daily   guaiFENesin 1,200 mg Oral BID   insulin aspart 0-9 Units Subcutaneous 4x Daily AC & at Bedtime   metoprolol tartrate 25 mg Oral Q12H   mupirocin 1 application Each Nare Daily   pantoprazole 40 mg Oral BID AC   sennosides-docusate sodium 2 tablet Oral Nightly   tiotropium 1 capsule Inhalation Daily -  RT          Diagnostics:  Imaging Results (last 24 hours)     Procedure Component Value Units Date/Time    XR Chest 1 View [[id number]] Collected:  01/21/17 1556     Updated:  01/21/17 1602    Narrative:       XR CHEST 1 VW-     HISTORY: Male who is 71 years-old,  pneumothorax     TECHNIQUE: Frontal view of the chest     COMPARISON: 01/20/2017     FINDINGS: Left chest tube remains in place. Heart, mediastinum and  pulmonary vasculature are unremarkable. Persistent right basilar opacity  is noted. No pleural effusion. Left apical pneumothorax is mildly  larger, measured at 10 mm, previously measured at 2 to 3 mm. Pulmonary  hyperinflation suggests COPD. No acute osseous process.       Impression:       Small left apical pneumothorax measures larger since the  prior exam, continued follow-up advised.        Discussed by telephone with the patient's nurse, Ilana, at time of  interpretation, 1600 1/21/2017.        This report was finalized on 1/21/2017 3:59 PM by Dr. Yuniel Murillo MD.             Reviewed chest x-ray and the radiology report above I really have trouble seeing the reported pneumothorax today actually saw the smaller one yesterday some right basilar atelectasis as well        Assessment/Plan     Patient Active Problem List   Diagnosis Code   • 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   • Essential hypertension I10   • Renal cyst N28.1   • Acute posthemorrhagic anemia D62   • Chronic coronary artery disease I25.10   • Abnormal result of cardiovascular function study R94.30   • Cholelithiasis K80.20   • Chronic obstructive bronchitis J44.9   • Current smoker F17.200   • AAA (abdominal aortic aneurysm) I71.4   • Angina of effort I20.8   • Coronary artery disease involving native coronary artery of native heart I25.10       Impression #1 status post 1/17/17 coronary bypass graft ×4 vessel with LIMA and AER  #2 severe COPD doing well should go home on a combination long-acting beta agonist long-acting  anticholinergic and nursery agent such as Stiolto or Anoro line #30 abuse we discussed cessation and options.  #3 small left apical pneumothorax and chest x-rays are ordered  #4 paroxysmal A. fib now in sinus rhythm    #5 peripheral arterial disease  #6 anemia expected postop blood loss  #7 thrombocytopenia expected postop line #8 atelectasis continue pulmonary hygiene    Plan for disposition:    Will Hanley MD  01/21/17  10:37 PM    Time:

## 2017-01-22 NOTE — PLAN OF CARE
Problem: Patient Care Overview (Adult)  Goal: Plan of Care Review  Outcome: Ongoing (interventions implemented as appropriate)    01/22/17 0901   Coping/Psychosocial Response Interventions   Plan Of Care Reviewed With patient   Outcome Evaluation   Outcome Summary/Follow up Plan Ambulating in [place] w/o assistive device, able to climb 8 steps w/o diffculty. DC PT, pt/spouse aware of activity recommendations and sternal precautions.          Problem: Inpatient Physical Therapy  Goal: Cardiopulmonary Goal LTG- PT  Outcome: Outcome(s) achieved Date Met:  01/22/17 01/22/17 0901   Cardiopulmonary PT LTG   Cardiopulmonary PT LTG, Date Goal Reviewed 01/22/17   Cardiopulmonary PT LTG, Outcome goal met

## 2017-01-22 NOTE — PLAN OF CARE
Problem: Patient Care Overview (Adult)  Goal: Plan of Care Review  Outcome: Ongoing (interventions implemented as appropriate)    01/22/17 1642   Coping/Psychosocial Response Interventions   Plan Of Care Reviewed With patient;spouse   Patient Care Overview   Progress improving   Outcome Evaluation   Outcome Summary/Follow up Plan Pt did well today. Follow up on Chest tube air leak showed a slight enlargement in [place] from 10mm to 12mm. Pt placed back on suction for the evening. Will reevaluate tomrrow.        Goal: Adult Individualization and Mutuality  Outcome: Ongoing (interventions implemented as appropriate)  Goal: Discharge Needs Assessment  Outcome: Ongoing (interventions implemented as appropriate)    Problem: Cardiac Surgery (Adult)  Goal: Signs and Symptoms of Listed Potential Problems Will be Absent or Manageable (Cardiac Surgery)  Outcome: Ongoing (interventions implemented as appropriate)    01/22/17 1642   Cardiac Surgery   Problems Assessed (Cardiac Surgery) all   Problems Present (Cardiac Surgery) none         Problem: Fall Risk (Adult)  Goal: Absence of Falls  Outcome: Ongoing (interventions implemented as appropriate)    01/22/17 1642   Fall Risk (Adult)   Absence of Falls making progress toward outcome         Problem: Skin Integrity Impairment, Risk/Actual (Adult)  Goal: Identify Related Risk Factors and Signs and Symptoms  Outcome: Ongoing (interventions implemented as appropriate)    01/22/17 1642   Skin Integrity Impairment, Risk/Actual   Skin Integrity Impairment, Risk/Actual: Related Risk Factors immobility   Signs and Symptoms (Skin Integrity Impairment) other (see comments)  (skin tear on right middle back )       Goal: Skin Integrity/Wound Healing  Outcome: Ongoing (interventions implemented as appropriate)    01/22/17 1642   Skin Integrity Impairment, Risk/Actual (Adult)   Skin Integrity/Wound Healing making progress toward outcome

## 2017-01-22 NOTE — PROGRESS NOTES
[facility]  Cardiology Progress Note    Patient Identification:  Name: Conor Faustin  Age: 71 y.o.  Sex: male  :  1945  MRN: [id number]                 Follow Up / Chief Complaint: Postop follow-up for the coronary artery disease    Interval History:  71 years old white male with known history of the coronary artery disease with a status post coronary artery bypass Surgery here for the follow-up denies any chest pains or tightness in chest no heaviness with a pressure sensation     Subjective:      Objective:    Past Medical History:  Past Medical History   Diagnosis Date   • AAA (abdominal aortic aneurysm)    • Benign essential hypertension    • COPD (chronic obstructive pulmonary disease)    • Coronary artery disease    • Renal cyst      RIGHT   • Smoker      1 PPD     Past Surgical History:  Past Surgical History   Procedure Laterality Date   • Cardiac catheterization     • Peripheral arterial stent graft     • Coronary angioplasty     • Cardiac catheterization N/A 2016     Procedure: Left Heart Cath;  Surgeon: Irena Hale MD;  Location: [facility] INVASIVE LOCATION;  Service:    • Cardiac catheterization     • Coronary artery bypass graft N/A 2017     Procedure: INTRAOPERATIVE THIAGO, MIDLINE STERNOTOMY, CORONARY ARTERY BYPASS GRAFTING X 4 UTILIZING LEFT INTERNAL MAMMARY ARTERY AND RIGHT ENDOSCOPICALLY HARVESTED GREATER SAPHENOUS VEIN, AORTIC VALVE REPLACEMENT;  Surgeon: Jassi De Jesus MD;  Location: [facility] OR;  Service:         Social History:   Social History   Substance Use Topics   • Smoking status: Current Every Day Smoker     Packs/day: 1.00     Years: 64.00   • Smokeless tobacco: Never Used   • Alcohol use No      Family History:  Family History   Problem Relation Age of Onset   • No Known Problems Mother    • No Known Problems Father    • No Known Problems Sister    • No Known Problems Brother    • No Known Problems Maternal Aunt    • No Known  Problems Maternal Uncle    • No Known Problems Paternal Aunt    • No Known Problems Paternal Uncle    • No Known Problems Maternal Grandmother    • No Known Problems Maternal Grandfather    • Heart disease Paternal Grandmother    • Stroke Paternal Grandmother    • No Known Problems Paternal Grandfather           Allergies:  Allergies   Allergen Reactions   • Bisoprolol Angioedema   • Clarithromycin Swelling     Scheduled Meds:    aspirin 81 mg Daily   atorvastatin 40 mg Nightly   budesonide-formoterol 2 puff BID - RT   enoxaparin 40 mg Daily   guaiFENesin 1,200 mg BID   insulin aspart 0-9 Units 4x Daily AC & at Bedtime   metoprolol tartrate 25 mg Q12H   mupirocin 1 application Daily   pantoprazole 40 mg BID AC   sennosides-docusate sodium 2 tablet Nightly   tiotropium 1 capsule Daily - RT           INTAKE AND OUTPUT:    Intake/Output Summary (Last 24 hours) at 01/22/17 1551  Last data filed at 01/22/17 1152   Gross per 24 hour   Intake     50 ml   Output    200 ml   Net   -150 ml       Review of Systems:   GI: No nausea vomiting  Cardiac: No chest pains tightness in chest  Pulmonary: No cough, sputum    Constitutional:  Temp:  [97.6 °F (36.4 °C)-99 °F (37.2 °C)] 98 °F (36.7 °C)  Heart Rate:  [70-90] 70  Resp:  [18-20] 20  BP: (113-151)/(60-78) 113/60    Physical Exam by Irena Hale MD  General:  Appears in no acute distress  Eyes: PERTL,  HEENT:  No JVD. Thyroid not visibly enlarged. No mucosal pallor or cyanosis  Respiratory: Respirations regular and unlabored at rest. BBS with good air entry in all fields. No crackles, rubs or wheezes auscultated  Cardiovascular: S1S2 Regular rate and rhythm. No murmur, rub or gallop. No carotid bruits. DP/PT pulses     . No pretibial pitting edema  Gastrointestinal: Abdomen soft, flat, non tender. Bowel sounds present. No hepatosplenomegaly. No ascites  Musculoskeletal: MANNING x4. No abnormal movements  Extremities: No digital clubbing or cyanosis  Skin: Color pink. Skin  warm and dry to touch. No rashes    Neuro: AAO x3 CN II-XII grossly intact  Psych: Mood and affect normal, pleasant and cooperative          Cardiographics  Telemetry:     ECG:     Echocardiogram:     Lab Review         Results from last 7 days  Lab Units 01/18/17  0254   MAGNESIUM mg/dL 2.5*       Results from last 7 days  Lab Units 01/22/17  0349   SODIUM mmol/L 137   POTASSIUM mmol/L 3.6   BUN mg/dL 23   CREATININE mg/dL 1.11   CALCIUM mg/dL 8.3*     @[id number]@    Results from last 7 days  Lab Units 01/22/17  1119 01/20/17  0336 01/19/17  0402   WBC 10*3/mm3 11.01* 13.55* 14.27*   HEMOGLOBIN g/dL 13.5* 14.1 13.1*   HEMATOCRIT % 41.5 43.2 42.7   PLATELETS 10*3/mm3 166 113* 106*       Results from last 7 days  Lab Units 01/22/17  0349 01/21/17  0431 01/20/17  1829  01/17/17  1235 01/17/17  1051   INR  1.17* 0.99 1.02  < > 1.50* 1.64*   APTT seconds  --   --   --   --  39.5* 32.9   < > = values in this interval not displayed.      Assessment:    Postoperative course was stable    Plan:  Continue supportive treatment      I reviewed the patient's new clinical results and treatment plan   I personally viewed and interpreted the patient's EKG/Telemetry data    )1/22/2017  Irena Hale MD      EMR Dragon/Transcription disclaimer:   Much of this encounter note is an electronic transcription/translation of spoken language to printed text. The electronic translation of spoken language may permit erroneous, or at times, nonsensical words or phrases to be inadvertently transcribed; Although I have reviewed the note for such errors, some may still exist.

## 2017-01-23 ENCOUNTER — APPOINTMENT (OUTPATIENT)
Dept: GENERAL RADIOLOGY | Facility: HOSPITAL | Age: 72
End: 2017-01-23

## 2017-01-23 LAB
ANION GAP SERPL CALCULATED.3IONS-SCNC: 12.5 MMOL/L
BUN BLD-MCNC: 21 MG/DL (ref 8–23)
BUN/CREAT SERPL: 19.8 (ref 7–25)
CALCIUM SPEC-SCNC: 8.6 MG/DL (ref 8.6–10.5)
CHLORIDE SERPL-SCNC: 97 MMOL/L (ref 98–107)
CO2 SERPL-SCNC: 26.5 MMOL/L (ref 22–29)
CREAT BLD-MCNC: 1.06 MG/DL (ref 0.76–1.27)
GFR SERPL CREATININE-BSD FRML MDRD: 69 ML/MIN/1.73
GLUCOSE BLD-MCNC: 145 MG/DL (ref 65–99)
GLUCOSE BLDC GLUCOMTR-MCNC: 121 MG/DL (ref 70–130)
GLUCOSE BLDC GLUCOMTR-MCNC: 125 MG/DL (ref 70–130)
GLUCOSE BLDC GLUCOMTR-MCNC: 146 MG/DL (ref 70–130)
INR PPP: 1.2 (ref 0.9–1.1)
POTASSIUM BLD-SCNC: 4.1 MMOL/L (ref 3.5–5.2)
PROTHROMBIN TIME: 14.8 SECONDS (ref 11.7–14.2)
SODIUM BLD-SCNC: 136 MMOL/L (ref 136–145)

## 2017-01-23 PROCEDURE — 80048 BASIC METABOLIC PNL TOTAL CA: CPT | Performed by: NURSE PRACTITIONER

## 2017-01-23 PROCEDURE — 71010 HC CHEST PA OR AP: CPT

## 2017-01-23 PROCEDURE — 94799 UNLISTED PULMONARY SVC/PX: CPT

## 2017-01-23 PROCEDURE — 99232 SBSQ HOSP IP/OBS MODERATE 35: CPT | Performed by: INTERNAL MEDICINE

## 2017-01-23 PROCEDURE — 25010000002 ENOXAPARIN PER 10 MG: Performed by: THORACIC SURGERY (CARDIOTHORACIC VASCULAR SURGERY)

## 2017-01-23 PROCEDURE — 82962 GLUCOSE BLOOD TEST: CPT

## 2017-01-23 PROCEDURE — 93005 ELECTROCARDIOGRAM TRACING: CPT | Performed by: THORACIC SURGERY (CARDIOTHORACIC VASCULAR SURGERY)

## 2017-01-23 PROCEDURE — 93010 ELECTROCARDIOGRAM REPORT: CPT | Performed by: INTERNAL MEDICINE

## 2017-01-23 PROCEDURE — 99024 POSTOP FOLLOW-UP VISIT: CPT | Performed by: PHYSICIAN ASSISTANT

## 2017-01-23 PROCEDURE — 85610 PROTHROMBIN TIME: CPT | Performed by: NURSE PRACTITIONER

## 2017-01-23 PROCEDURE — 94640 AIRWAY INHALATION TREATMENT: CPT

## 2017-01-23 RX ORDER — FUROSEMIDE 40 MG/1
40 TABLET ORAL DAILY
Status: DISCONTINUED | OUTPATIENT
Start: 2017-01-24 | End: 2017-01-25 | Stop reason: HOSPADM

## 2017-01-23 RX ORDER — WARFARIN SODIUM 4 MG/1
4 TABLET ORAL
Status: COMPLETED | OUTPATIENT
Start: 2017-01-23 | End: 2017-01-23

## 2017-01-23 RX ORDER — POTASSIUM CHLORIDE 750 MG/1
20 CAPSULE, EXTENDED RELEASE ORAL DAILY
Status: DISCONTINUED | OUTPATIENT
Start: 2017-01-24 | End: 2017-01-25 | Stop reason: HOSPADM

## 2017-01-23 RX ADMIN — BUDESONIDE AND FORMOTEROL FUMARATE DIHYDRATE 2 PUFF: 160; 4.5 AEROSOL RESPIRATORY (INHALATION) at 08:00

## 2017-01-23 RX ADMIN — HYDROCODONE BITARTRATE AND ACETAMINOPHEN 2 TABLET: 5; 325 TABLET ORAL at 10:25

## 2017-01-23 RX ADMIN — ATORVASTATIN CALCIUM 40 MG: 40 TABLET, FILM COATED ORAL at 21:36

## 2017-01-23 RX ADMIN — ASPIRIN 81 MG: 81 TABLET ORAL at 10:05

## 2017-01-23 RX ADMIN — METOPROLOL TARTRATE 25 MG: 25 TABLET ORAL at 10:05

## 2017-01-23 RX ADMIN — METOPROLOL TARTRATE 25 MG: 25 TABLET ORAL at 21:36

## 2017-01-23 RX ADMIN — DOCUSATE SODIUM -SENNOSIDES 2 TABLET: 50; 8.6 TABLET, COATED ORAL at 21:36

## 2017-01-23 RX ADMIN — GUAIFENESIN 1200 MG: 600 TABLET, EXTENDED RELEASE ORAL at 10:05

## 2017-01-23 RX ADMIN — GUAIFENESIN 1200 MG: 600 TABLET, EXTENDED RELEASE ORAL at 18:30

## 2017-01-23 RX ADMIN — WARFARIN SODIUM 4 MG: 4 TABLET ORAL at 18:30

## 2017-01-23 RX ADMIN — ACETAMINOPHEN 650 MG: 325 TABLET ORAL at 21:36

## 2017-01-23 RX ADMIN — MUPIROCIN 1 APPLICATION: 20 OINTMENT TOPICAL at 10:06

## 2017-01-23 RX ADMIN — PANTOPRAZOLE SODIUM 40 MG: 40 TABLET, DELAYED RELEASE ORAL at 10:05

## 2017-01-23 RX ADMIN — BUDESONIDE AND FORMOTEROL FUMARATE DIHYDRATE 2 PUFF: 160; 4.5 AEROSOL RESPIRATORY (INHALATION) at 20:46

## 2017-01-23 RX ADMIN — TIOTROPIUM BROMIDE 1 CAPSULE: 18 CAPSULE ORAL; RESPIRATORY (INHALATION) at 08:00

## 2017-01-23 RX ADMIN — ENOXAPARIN SODIUM 40 MG: 40 INJECTION SUBCUTANEOUS at 10:05

## 2017-01-23 RX ADMIN — PANTOPRAZOLE SODIUM 40 MG: 40 TABLET, DELAYED RELEASE ORAL at 18:30

## 2017-01-23 NOTE — PROGRESS NOTES
LOS: 5 days   Patient Care Team:  Jonathan García MD as PCP - General (Internal Medicine)        Subjective: She reports she feels better today than yesterday not short of breath no significant cough now    Objective     Vital Signs  Temp:  [97.6 °F (36.4 °C)-99 °F (37.2 °C)] 97.7 °F (36.5 °C)  Heart Rate:  [70-90] 74  Resp:  [18-20] 20  BP: (113-151)/(60-78) 124/63  Body mass index is 32.77 kg/(m^2).    Intake/Output Summary (Last 24 hours) at 01/22/17 2131  Last data filed at 01/22/17 1900   Gross per 24 hour   Intake    290 ml   Output    270 ml   Net     20 ml          Physical Exam:  General Appearance: White male resting comfortably in bed does not appear in any acute distress  Eyes: Conjunctiva clear and anicteric  ENT: His membranes are moist no erythema or exudates Mallampati type I airway, nasal septum midline  Neck: Adenopathy or thyromegaly no jugular venous distention trachea midline  Lungs: There are no hear any wheezes rales or rhonchi chest expansion is symmetric breath sounds are equal bilaterally sternotomy incision appears to be healing well.  Patient did his incentive spirometry to about 2250ml..  Chest tube I don't see any air leak  Cardiac: Regular rhythm no murmur  Abdomen: Palpable organomegaly  : Not examined  Musc/Skel: Normal  Skin: No jaundice no petechiae  Neuro: Oriented cooperative grossly intact  Extremities/P Vascular: No clubbing or cyanosis trace ankle edema bilaterally palpable radial dorsalis pedis pulses  MSE: Be in pretty good spirits        Labs:  WBC No results found for: WBCS   HGB HEMOGLOBIN   Date Value Ref Range Status   01/22/2017 13.5 (L) 13.7 - 17.6 g/dL Final   01/20/2017 14.1 13.7 - 17.6 g/dL Final      HCT HEMATOCRIT   Date Value Ref Range Status   01/22/2017 41.5 40.4 - 52.2 % Final   01/20/2017 43.2 40.4 - 52.2 % Final      Platlets No results found for: LABPLAT     PT/INR:    PROTIME   Date Value Ref Range Status   01/22/2017 14.5 (H) 11.7 - 14.2 Seconds  Final   01/21/2017 12.7 11.7 - 14.2 Seconds Final   01/20/2017 13.0 11.7 - 14.2 Seconds Final   /  INR   Date Value Ref Range Status   01/22/2017 1.17 (H) 0.90 - 1.10 Final   01/21/2017 0.99 0.90 - 1.10 Final   01/20/2017 1.02 0.90 - 1.10 Final       Sodium SODIUM   Date Value Ref Range Status   01/22/2017 137 136 - 145 mmol/L Final   01/21/2017 138 136 - 145 mmol/L Final   01/20/2017 137 136 - 145 mmol/L Final      Potassium POTASSIUM   Date Value Ref Range Status   01/22/2017 4.0 3.5 - 5.2 mmol/L Final   01/22/2017 3.6 3.5 - 5.2 mmol/L Final   01/21/2017 4.1 3.5 - 5.2 mmol/L Final   01/20/2017 4.1 3.5 - 5.2 mmol/L Final      Chloride CHLORIDE   Date Value Ref Range Status   01/22/2017 99 98 - 107 mmol/L Final   01/21/2017 99 98 - 107 mmol/L Final   01/20/2017 99 98 - 107 mmol/L Final      Bicarbonate No results found for: PLASMABICARB   BUN BUN   Date Value Ref Range Status   01/22/2017 23 8 - 23 mg/dL Final   01/21/2017 26 (H) 8 - 23 mg/dL Final   01/20/2017 21 8 - 23 mg/dL Final      Creatinine CREATININE   Date Value Ref Range Status   01/22/2017 1.11 0.76 - 1.27 mg/dL Final   01/21/2017 1.12 0.76 - 1.27 mg/dL Final   01/20/2017 1.03 0.76 - 1.27 mg/dL Final      Calcium CALCIUM   Date Value Ref Range Status   01/22/2017 8.3 (L) 8.6 - 10.5 mg/dL Final   01/21/2017 8.7 8.6 - 10.5 mg/dL Final   01/20/2017 8.9 8.6 - 10.5 mg/dL Final      Magnesium No results found for: MG         pH No results found for: PHART   pO2 No results found for: PO2ART   pCO2 No results found for: [id number]   HCO3 No results found for: [id number]       aspirin 81 mg Oral Daily   atorvastatin 40 mg Oral Nightly   budesonide-formoterol 2 puff Inhalation BID - RT   enoxaparin 40 mg Subcutaneous Daily   guaiFENesin 1,200 mg Oral BID   insulin aspart 0-9 Units Subcutaneous 4x Daily AC & at Bedtime   metoprolol tartrate 25 mg Oral Q12H   mupirocin 1 application Each Nare Daily   pantoprazole 40 mg Oral BID AC   sennosides-docusate sodium 2 tablet  Oral Nightly   tiotropium 1 capsule Inhalation Daily - RT          Diagnostics:  Imaging Results (last 24 hours)     Procedure Component Value Units Date/Time    XR Chest 1 View [[id number]] Collected:  01/21/17 1556     Updated:  01/21/17 1602    Narrative:       XR CHEST 1 VW-     HISTORY: Male who is 71 years-old,  pneumothorax     TECHNIQUE: Frontal view of the chest     COMPARISON: 01/20/2017     FINDINGS: Left chest tube remains in place. Heart, mediastinum and  pulmonary vasculature are unremarkable. Persistent right basilar opacity  is noted. No pleural effusion. Left apical pneumothorax is mildly  larger, measured at 10 mm, previously measured at 2 to 3 mm. Pulmonary  hyperinflation suggests COPD. No acute osseous process.       Impression:       Small left apical pneumothorax measures larger since the  prior exam, continued follow-up advised.        Discussed by telephone with the patient's nurse, Ilana, at time of  interpretation, 1600 1/21/2017.        This report was finalized on 1/21/2017 3:59 PM by Dr. Yuniel Murillo MD.             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   • Essential hypertension I10   • Renal cyst N28.1   • Acute posthemorrhagic anemia D62   • Chronic coronary artery disease I25.10   • Abnormal result of cardiovascular function study R94.30   • Cholelithiasis K80.20   • Chronic obstructive bronchitis J44.9   • Current smoker F17.200   • AAA (abdominal aortic aneurysm) I71.4   • Angina of effort I20.8   • Coronary artery disease involving native coronary artery of native heart I25.10       Impression #1 status post 1/17/17 coronary bypass graft ×4 vessel with LIMA and AER  #2 severe COPD doing well should go home on a combination long-acting beta agonist long-acting anticholinergic agent such as  Stiolto or Anoro    # tobacco abuse we discussed cessation and options.  Again.  #3 small left apical pneumothorax slightly increased again today continue follow-up continue chest tube  #4 paroxysmal A. fib now in sinus rhythm    #5 peripheral arterial disease  #6 anemia expected postop blood loss  #7 thrombocytopenia expected postop line #8 atelectasis continue pulmonary hygiene    Plan for disposition:    Will Hanley MD  01/22/17  9:31 PM    Time:

## 2017-01-23 NOTE — PROGRESS NOTES
[facility]  Cardiology Progress Note    Patient Identification:  Name: Conor Faustin  Age: 71 y.o.  Sex: male  :  1945  MRN: [id number]                 Follow Up / Chief Complaint: Postop follow-up for the coronary artery disease    Interval History:  71 years old white male with known history of the coronary artery disease with a status post coronary artery bypass Surgery here for the follow-up denies any chest pains on     Subjective:  Minimal incisional discomfort.  Denies palpitations, dizziness or shortness of breath.  Appetite is good    Objective:  Maintaining sinus rhythm.  Coumadin was started today by cardiothoracic surgery.  Still has one chest tube.  Blood pressure is stable.    Past Medical History:  Past Medical History   Diagnosis Date   • AAA (abdominal aortic aneurysm)    • Benign essential hypertension    • COPD (chronic obstructive pulmonary disease)    • Coronary artery disease    • Renal cyst      RIGHT   • Smoker      1 PPD     Past Surgical History:  Past Surgical History   Procedure Laterality Date   • Cardiac catheterization     • Peripheral arterial stent graft     • Coronary angioplasty     • Cardiac catheterization N/A 2016     Procedure: Left Heart Cath;  Surgeon: Irena Hale MD;  Location: [facility] INVASIVE LOCATION;  Service:    • Cardiac catheterization     • Coronary artery bypass graft N/A 2017     Procedure: INTRAOPERATIVE THIAGO, MIDLINE STERNOTOMY, CORONARY ARTERY BYPASS GRAFTING X 4 UTILIZING LEFT INTERNAL MAMMARY ARTERY AND RIGHT ENDOSCOPICALLY HARVESTED GREATER SAPHENOUS VEIN, AORTIC VALVE REPLACEMENT;  Surgeon: Jassi De Jesus MD;  Location: [facility] OR;  Service:         Social History:   Social History   Substance Use Topics   • Smoking status: Current Every Day Smoker     Packs/day: 1.00     Years: 64.00   • Smokeless tobacco: Never Used   • Alcohol use No      Family History:  Family History   Problem Relation Age of  Onset   • No Known Problems Mother    • No Known Problems Father    • No Known Problems Sister    • No Known Problems Brother    • No Known Problems Maternal Aunt    • No Known Problems Maternal Uncle    • No Known Problems Paternal Aunt    • No Known Problems Paternal Uncle    • No Known Problems Maternal Grandmother    • No Known Problems Maternal Grandfather    • Heart disease Paternal Grandmother    • Stroke Paternal Grandmother    • No Known Problems Paternal Grandfather           Allergies:  Allergies   Allergen Reactions   • Bisoprolol Angioedema   • Clarithromycin Swelling     Scheduled Meds:    aspirin 81 mg Daily   atorvastatin 40 mg Nightly   budesonide-formoterol 2 puff BID - RT   enoxaparin 40 mg Daily   guaiFENesin 1,200 mg BID   insulin aspart 0-9 Units 4x Daily AC & at Bedtime   metoprolol tartrate 25 mg Q12H   mupirocin 1 application Daily   pantoprazole 40 mg BID AC   sennosides-docusate sodium 2 tablet Nightly   tiotropium 1 capsule Daily - RT   warfarin 4 mg Once           INTAKE AND OUTPUT:    Intake/Output Summary (Last 24 hours) at 01/23/17 3051  Last data filed at 01/23/17 0408   Gross per 24 hour   Intake      0 ml   Output    130 ml   Net   -130 ml       Review of Systems:   GI: No nausea vomiting  Cardiac: No chest pain or palpitations  Pulmonary: No increased work of breathing at rest    Constitutional:  Temp:  [97.7 °F (36.5 °C)-98.6 °F (37 °C)] 98.6 °F (37 °C)  Heart Rate:  [70-95] 95  Resp:  [18-20] 18  BP: (124-133)/(63-70) 130/70    Physical Exam by Irena Hale MD  General:  Sitting up at bedside Appears in no acute distress  Eyes: PERTL,  HEENT:  . Thyroid not visibly enlarged. No mucosal  cyanosis  Respiratory: Respirations regular and unlabored at rest. BBS with interest air entry in L>R bases. No crackles or wheezes auscultated  CT-> gravity  Cardiovascular: S1S2 Regular rate and rhythm. rub or gallop.  No pretibial pitting edema with TEDs  Gastrointestinal: Abdomen  soft, fnon tender. Bowel sounds present.   Musculoskeletal: MANNING x4. No abnormal movements  Extremities: No digital c cyanosis  Skin: Color pink. Skin warm and dry to touch. No rashes    Neuro: AAO x3 CN II-XII grossly intact  Psych: Mood and affect normal, pleasant and cooperative          Cardiographics  Telemetry:     ECG 1-23-17:       Echocardiogram :   · All left ventricular wall segments contract normally.  · Left ventricular diastolic dysfunction (grade I) consistent with impaired relaxation.  · Left atrial cavity size is borderline dilated.  · Mild tricuspid valve regurgitation is present.  · Left Ventricle: Calculated EF = 64.3%  · There is no evidence of pericardial effusion.      Lab Review       Results from last 7 days  Lab Units 01/23/17  0411   SODIUM mmol/L 136   POTASSIUM mmol/L 4.1   BUN mg/dL 21   CREATININE mg/dL 1.06   CALCIUM mg/dL 8.6       Results from last 7 days  Lab Units 01/23/17  0411 01/22/17  0349 01/21/17  0431  01/17/17  1235 01/17/17  1051   INR  1.20* 1.17* 0.99  < > 1.50* 1.64*   APTT seconds  --   --   --   --  39.5* 32.9   < > = values in this interval not displayed.      Assessment:  - CAD  - s/p 4v CABG -> CTS  - left apical pneumothorax -> CTS  - post op PAF  - EF 60-65%  - HTN  - COPD      Plan:  - s/p 4v CABG  - chest tube remains due to small left apical pneumothorax    - post op PAF -> SR. on beta blocker . Coumadin started today per cardiothoracic surgery    - HTN - controlled on beta blocker      Coumadin as per cardiothoracic surgery.  Start low-dose Lasix and potassium replacement.  Encouraged incentive spirometry with cough and deep breathing.  BNP and BMP ordered for a.m.    I reviewed the patient's new clinical results and treatment plan   I personally viewed and interpreted the patient's EKG/Telemetry data    )1/23/2017  Irena Hale MD      EMR Dragon/Transcription disclaimer:   Much of this encounter note is an electronic  transcription/translation of spoken language to printed text. The electronic translation of spoken language may permit erroneous, or at times, nonsensical words or phrases to be inadvertently transcribed; Although I have reviewed the note for such errors, some may still exist.

## 2017-01-23 NOTE — PROGRESS NOTES
" LOS: 6 days   Patient Care Team:  Jonathan García MD as PCP - General (Internal Medicine)    Chief Complaint: F/u CABG    Subjective     Doing better, incisional pain with coughing       Vital Signs  Temp:  [97.7 °F (36.5 °C)-98.6 °F (37 °C)] 98.6 °F (37 °C)  Heart Rate:  [70-95] 95  Resp:  [18-20] 18  BP: (113-133)/(60-71) 130/70  Body mass index is 33 kg/(m^2).    Intake/Output Summary (Last 24 hours) at 01/23/17 1032  Last data filed at 01/23/17 0408   Gross per 24 hour   Intake    290 ml   Output    130 ml   Net    160 ml        Chest tube drainage last 12 hours: 60cc no air leak, chest tube place back suction yesterday for increased apical pneumo.    Last 3 weights    01/21/17  0606 01/22/17  0700 01/23/17  0408   Weight: 229 lb 9.6 oz (104 kg) 228 lb 6.4 oz (104 kg) 230 lb (104 kg)         Objective:  General Appearance:  Comfortable and in no acute distress.    Vital signs: (most recent): Blood pressure 130/70, pulse 95, temperature 98.6 °F (37 °C), temperature source Oral, resp. rate 18, height 70\" (177.8 cm), weight 230 lb (104 kg), SpO2 93 %.  Vital signs are normal.    Lungs:  Normal respiratory rate and normal effort.  There are decreased breath sounds.    Heart: Normal rate.  Regular rhythm.  S1 normal and S2 normal.  (Tele: SR)  Abdomen: Abdomen is soft.    Extremities: Normal range of motion.    Neurological: Patient is alert and oriented to person, place and time.    Skin:  Warm and dry.  (Incision wnl)          Results Review:    WBC WBC   Date Value Ref Range Status   01/22/2017 11.01 (H) 4.50 - 10.70 10*3/mm3 Final      HGB HEMOGLOBIN   Date Value Ref Range Status   01/22/2017 13.5 (L) 13.7 - 17.6 g/dL Final      HCT HEMATOCRIT   Date Value Ref Range Status   01/22/2017 41.5 40.4 - 52.2 % Final      Platelets PLATELETS   Date Value Ref Range Status   01/22/2017 166 140 - 500 10*3/mm3 Final        PT/INR:    PROTIME   Date Value Ref Range Status   01/23/2017 14.8 (H) 11.7 - 14.2 Seconds Final "   01/22/2017 14.5 (H) 11.7 - 14.2 Seconds Final   01/21/2017 12.7 11.7 - 14.2 Seconds Final   01/20/2017 13.0 11.7 - 14.2 Seconds Final   /  INR   Date Value Ref Range Status   01/23/2017 1.20 (H) 0.90 - 1.10 Final   01/22/2017 1.17 (H) 0.90 - 1.10 Final   01/21/2017 0.99 0.90 - 1.10 Final   01/20/2017 1.02 0.90 - 1.10 Final       Sodium SODIUM   Date Value Ref Range Status   01/23/2017 136 136 - 145 mmol/L Final   01/22/2017 137 136 - 145 mmol/L Final   01/21/2017 138 136 - 145 mmol/L Final      Potassium POTASSIUM   Date Value Ref Range Status   01/23/2017 4.1 3.5 - 5.2 mmol/L Final   01/22/2017 4.0 3.5 - 5.2 mmol/L Final   01/22/2017 3.6 3.5 - 5.2 mmol/L Final   01/21/2017 4.1 3.5 - 5.2 mmol/L Final      Chloride CHLORIDE   Date Value Ref Range Status   01/23/2017 97 (L) 98 - 107 mmol/L Final   01/22/2017 99 98 - 107 mmol/L Final   01/21/2017 99 98 - 107 mmol/L Final      Bicarbonate CO2   Date Value Ref Range Status   01/23/2017 26.5 22.0 - 29.0 mmol/L Final   01/22/2017 28.8 22.0 - 29.0 mmol/L Final   01/21/2017 28.6 22.0 - 29.0 mmol/L Final      BUN BUN   Date Value Ref Range Status   01/23/2017 21 8 - 23 mg/dL Final   01/22/2017 23 8 - 23 mg/dL Final   01/21/2017 26 (H) 8 - 23 mg/dL Final      Creatinine CREATININE   Date Value Ref Range Status   01/23/2017 1.06 0.76 - 1.27 mg/dL Final   01/22/2017 1.11 0.76 - 1.27 mg/dL Final   01/21/2017 1.12 0.76 - 1.27 mg/dL Final      Calcium CALCIUM   Date Value Ref Range Status   01/23/2017 8.6 8.6 - 10.5 mg/dL Final   01/22/2017 8.3 (L) 8.6 - 10.5 mg/dL Final   01/21/2017 8.7 8.6 - 10.5 mg/dL Final      Magnesium No results found for: MG         aspirin 81 mg Oral Daily   atorvastatin 40 mg Oral Nightly   budesonide-formoterol 2 puff Inhalation BID - RT   enoxaparin 40 mg Subcutaneous Daily   guaiFENesin 1,200 mg Oral BID   insulin aspart 0-9 Units Subcutaneous 4x Daily AC & at Bedtime   metoprolol tartrate 25 mg Oral Q12H   mupirocin 1 application Each Nare Daily    pantoprazole 40 mg Oral BID AC   sennosides-docusate sodium 2 tablet Oral Nightly   tiotropium 1 capsule Inhalation Daily - RT              Patient Active Problem List   Diagnosis Code   • 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   • Essential hypertension I10   • Renal cyst N28.1   • Acute posthemorrhagic anemia D62   • Chronic coronary artery disease I25.10   • Abnormal result of cardiovascular function study R94.30   • Cholelithiasis K80.20   • Chronic obstructive bronchitis J44.9   • Current smoker F17.200   • AAA (abdominal aortic aneurysm) I71.4   • Angina of effort I20.8   • Coronary artery disease involving native coronary artery of native heart I25.10       Assessment & Plan     -Severe multivessel CAD  -NL LV systolic function, EF 64%  -POD#6 CABG x 4 w/LIMA & AVR (pericardial)  -Expected post op acute blood loss anemia, stable  -Thrombocytopenia, stable  -HTN-stable  -COPD->1/12/17 PFT's showed severe airway obstruction -> pulmonary following  -Persistent tobacco abuse  -AAA  -PAD, s/p stent to lower extremity  -Mildly elevated creatinine, improved; good urine output, will monitor  -Post op PAF, QTC prolonged with amio>d'cd>coumadin started 1/20, currently SR no more PAF last 24 hours, will dose coumadin daily       -Chest tube placed back on suction yesterday for increased apical pneumo. CXR today shows decreased apical pneumo, will reclamp chest and repeat CXR. Hopefully will d/c chest tube later today  -INR 1.2 this am,   -SR this am with HR 77  -will give coumadin dose today for previous afib.    HOME Nathan  01/23/17  10:32 AM

## 2017-01-23 NOTE — NURSING NOTE
Consult rec'd for coccyx check.  Coccyx and buttocks pink but blanchable.  No pressure ulcer noted. Pt sitting up in chair on waffle cushion.  Z guard in use.  He and wife report he shifts position often.  Cont tx with z guard.

## 2017-01-24 ENCOUNTER — APPOINTMENT (OUTPATIENT)
Dept: GENERAL RADIOLOGY | Facility: HOSPITAL | Age: 72
End: 2017-01-24
Attending: THORACIC SURGERY (CARDIOTHORACIC VASCULAR SURGERY)

## 2017-01-24 LAB
ANION GAP SERPL CALCULATED.3IONS-SCNC: 12 MMOL/L
BUN BLD-MCNC: 19 MG/DL (ref 8–23)
BUN/CREAT SERPL: 18.3 (ref 7–25)
CALCIUM SPEC-SCNC: 8.6 MG/DL (ref 8.6–10.5)
CHLORIDE SERPL-SCNC: 98 MMOL/L (ref 98–107)
CO2 SERPL-SCNC: 27 MMOL/L (ref 22–29)
CREAT BLD-MCNC: 1.04 MG/DL (ref 0.76–1.27)
GFR SERPL CREATININE-BSD FRML MDRD: 71 ML/MIN/1.73
GLUCOSE BLD-MCNC: 173 MG/DL (ref 65–99)
GLUCOSE BLDC GLUCOMTR-MCNC: 123 MG/DL (ref 70–130)
GLUCOSE BLDC GLUCOMTR-MCNC: 129 MG/DL (ref 70–130)
GLUCOSE BLDC GLUCOMTR-MCNC: 181 MG/DL (ref 70–130)
GLUCOSE BLDC GLUCOMTR-MCNC: 227 MG/DL (ref 70–130)
INR PPP: 1.1 (ref 0.9–1.1)
NT-PROBNP SERPL-MCNC: 909.9 PG/ML (ref 0–900)
POTASSIUM BLD-SCNC: 3.7 MMOL/L (ref 3.5–5.2)
PROTHROMBIN TIME: 13.7 SECONDS (ref 11.7–14.2)
SODIUM BLD-SCNC: 137 MMOL/L (ref 136–145)

## 2017-01-24 PROCEDURE — 82962 GLUCOSE BLOOD TEST: CPT

## 2017-01-24 PROCEDURE — 63710000001 INSULIN ASPART PER 5 UNITS: Performed by: NURSE PRACTITIONER

## 2017-01-24 PROCEDURE — 99232 SBSQ HOSP IP/OBS MODERATE 35: CPT | Performed by: INTERNAL MEDICINE

## 2017-01-24 PROCEDURE — 94799 UNLISTED PULMONARY SVC/PX: CPT

## 2017-01-24 PROCEDURE — 83880 ASSAY OF NATRIURETIC PEPTIDE: CPT | Performed by: NURSE PRACTITIONER

## 2017-01-24 PROCEDURE — 93005 ELECTROCARDIOGRAM TRACING: CPT | Performed by: THORACIC SURGERY (CARDIOTHORACIC VASCULAR SURGERY)

## 2017-01-24 PROCEDURE — 99024 POSTOP FOLLOW-UP VISIT: CPT | Performed by: PHYSICIAN ASSISTANT

## 2017-01-24 PROCEDURE — 84132 ASSAY OF SERUM POTASSIUM: CPT | Performed by: THORACIC SURGERY (CARDIOTHORACIC VASCULAR SURGERY)

## 2017-01-24 PROCEDURE — 25010000002 ENOXAPARIN PER 10 MG: Performed by: THORACIC SURGERY (CARDIOTHORACIC VASCULAR SURGERY)

## 2017-01-24 PROCEDURE — 94640 AIRWAY INHALATION TREATMENT: CPT

## 2017-01-24 PROCEDURE — 80048 BASIC METABOLIC PNL TOTAL CA: CPT | Performed by: NURSE PRACTITIONER

## 2017-01-24 PROCEDURE — 85610 PROTHROMBIN TIME: CPT | Performed by: NURSE PRACTITIONER

## 2017-01-24 PROCEDURE — 71010 HC CHEST PA OR AP: CPT

## 2017-01-24 PROCEDURE — 93010 ELECTROCARDIOGRAM REPORT: CPT | Performed by: INTERNAL MEDICINE

## 2017-01-24 RX ADMIN — ENOXAPARIN SODIUM 40 MG: 40 INJECTION SUBCUTANEOUS at 11:17

## 2017-01-24 RX ADMIN — GUAIFENESIN 1200 MG: 600 TABLET, EXTENDED RELEASE ORAL at 11:17

## 2017-01-24 RX ADMIN — FUROSEMIDE 40 MG: 40 TABLET ORAL at 11:17

## 2017-01-24 RX ADMIN — BUDESONIDE AND FORMOTEROL FUMARATE DIHYDRATE 2 PUFF: 160; 4.5 AEROSOL RESPIRATORY (INHALATION) at 20:46

## 2017-01-24 RX ADMIN — METOPROLOL TARTRATE 25 MG: 25 TABLET ORAL at 11:18

## 2017-01-24 RX ADMIN — ATORVASTATIN CALCIUM 40 MG: 40 TABLET, FILM COATED ORAL at 20:59

## 2017-01-24 RX ADMIN — PANTOPRAZOLE SODIUM 40 MG: 40 TABLET, DELAYED RELEASE ORAL at 05:44

## 2017-01-24 RX ADMIN — TIOTROPIUM BROMIDE 1 CAPSULE: 18 CAPSULE ORAL; RESPIRATORY (INHALATION) at 08:25

## 2017-01-24 RX ADMIN — POTASSIUM CHLORIDE 20 MEQ: 750 CAPSULE, EXTENDED RELEASE ORAL at 11:18

## 2017-01-24 RX ADMIN — BUDESONIDE AND FORMOTEROL FUMARATE DIHYDRATE 2 PUFF: 160; 4.5 AEROSOL RESPIRATORY (INHALATION) at 08:25

## 2017-01-24 RX ADMIN — GUAIFENESIN 1200 MG: 600 TABLET, EXTENDED RELEASE ORAL at 18:37

## 2017-01-24 RX ADMIN — INSULIN ASPART 4 UNITS: 100 INJECTION, SOLUTION INTRAVENOUS; SUBCUTANEOUS at 21:00

## 2017-01-24 RX ADMIN — ASPIRIN 81 MG: 81 TABLET ORAL at 11:18

## 2017-01-24 RX ADMIN — MUPIROCIN 1 APPLICATION: 20 OINTMENT TOPICAL at 11:17

## 2017-01-24 RX ADMIN — PANTOPRAZOLE SODIUM 40 MG: 40 TABLET, DELAYED RELEASE ORAL at 11:22

## 2017-01-24 RX ADMIN — ASPIRIN 81 MG: 81 TABLET ORAL at 11:22

## 2017-01-24 RX ADMIN — METOPROLOL TARTRATE 25 MG: 25 TABLET ORAL at 20:59

## 2017-01-24 RX ADMIN — PANTOPRAZOLE SODIUM 40 MG: 40 TABLET, DELAYED RELEASE ORAL at 18:37

## 2017-01-24 NOTE — PROGRESS NOTES
LOS: 6 days   Patient Care Team:  Jonathan García MD as PCP - General (Internal Medicine)        Subjective: Not felt as well today just tired and worn out not short of breath no chest pain other than a little soreness    Objective     Vital Signs  Temp:  [98.4 °F (36.9 °C)-99 °F (37.2 °C)] 99 °F (37.2 °C)  Heart Rate:  [68-95] 68  Resp:  [16-18] 16  BP: (123-133)/(60-70) 123/60  Body mass index is 33 kg/(m^2).    Intake/Output Summary (Last 24 hours) at 01/23/17 2153  Last data filed at 01/23/17 1814   Gross per 24 hour   Intake    240 ml   Output    110 ml   Net    130 ml          Physical Exam:  General Appearance: White male resting comfortably in bed does not appear in any acute distress  Eyes: Conjunctiva clear and anicteric  ENT: This membranes are moist no erythema or exudates Mallampati type I airway, nasal septum midline  Neck: Adenopathy or thyromegaly no jugular venous distention trachea midline  Lungs: Clear no wheezes rales or rhonchi he is nonlabored chest expansion is symmetric chest tube is clamped  Cardiac: Regular rhythm no murmur  Abdomen: Palpable organomegaly  : Not examined  Musc/Skel: Normal  Skin: No jaundice no petechiae  Neuro: Oriented cooperative grossly intact  Extremities/P Vascular: No clubbing or cyanosis trace ankle edema bilaterally palpable radial dorsalis pedis pulses  MSE: Very flat depressed appearing today        Labs:  WBC No results found for: WBCS   HGB HEMOGLOBIN   Date Value Ref Range Status   01/22/2017 13.5 (L) 13.7 - 17.6 g/dL Final      HCT HEMATOCRIT   Date Value Ref Range Status   01/22/2017 41.5 40.4 - 52.2 % Final      Platlets No results found for: LABPLAT     PT/INR:    PROTIME   Date Value Ref Range Status   01/23/2017 14.8 (H) 11.7 - 14.2 Seconds Final   01/22/2017 14.5 (H) 11.7 - 14.2 Seconds Final   01/21/2017 12.7 11.7 - 14.2 Seconds Final   /  INR   Date Value Ref Range Status   01/23/2017 1.20 (H) 0.90 - 1.10 Final   01/22/2017 1.17 (H) 0.90 -  1.10 Final   01/21/2017 0.99 0.90 - 1.10 Final       Sodium SODIUM   Date Value Ref Range Status   01/23/2017 136 136 - 145 mmol/L Final   01/22/2017 137 136 - 145 mmol/L Final   01/21/2017 138 136 - 145 mmol/L Final      Potassium POTASSIUM   Date Value Ref Range Status   01/23/2017 4.1 3.5 - 5.2 mmol/L Final   01/22/2017 4.0 3.5 - 5.2 mmol/L Final   01/22/2017 3.6 3.5 - 5.2 mmol/L Final   01/21/2017 4.1 3.5 - 5.2 mmol/L Final      Chloride CHLORIDE   Date Value Ref Range Status   01/23/2017 97 (L) 98 - 107 mmol/L Final   01/22/2017 99 98 - 107 mmol/L Final   01/21/2017 99 98 - 107 mmol/L Final      Bicarbonate No results found for: PLASMABICARB   BUN BUN   Date Value Ref Range Status   01/23/2017 21 8 - 23 mg/dL Final   01/22/2017 23 8 - 23 mg/dL Final   01/21/2017 26 (H) 8 - 23 mg/dL Final      Creatinine CREATININE   Date Value Ref Range Status   01/23/2017 1.06 0.76 - 1.27 mg/dL Final   01/22/2017 1.11 0.76 - 1.27 mg/dL Final   01/21/2017 1.12 0.76 - 1.27 mg/dL Final      Calcium CALCIUM   Date Value Ref Range Status   01/23/2017 8.6 8.6 - 10.5 mg/dL Final   01/22/2017 8.3 (L) 8.6 - 10.5 mg/dL Final   01/21/2017 8.7 8.6 - 10.5 mg/dL Final      Magnesium No results found for: MG         pH No results found for: PHART   pO2 No results found for: PO2ART   pCO2 No results found for: [id number]   HCO3 No results found for: MUR1KKA       aspirin 81 mg Oral Daily   atorvastatin 40 mg Oral Nightly   budesonide-formoterol 2 puff Inhalation BID - RT   enoxaparin 40 mg Subcutaneous Daily   [START ON 1/24/2017] furosemide 40 mg Oral Daily   guaiFENesin 1,200 mg Oral BID   insulin aspart 0-9 Units Subcutaneous 4x Daily AC & at Bedtime   metoprolol tartrate 25 mg Oral Q12H   mupirocin 1 application Each Nare Daily   pantoprazole 40 mg Oral BID AC   [START ON 1/24/2017] potassium chloride 20 mEq Oral Daily   sennosides-docusate sodium 2 tablet Oral Nightly   tiotropium 1 capsule Inhalation Daily - RT           Diagnostics:  Imaging Results (last 24 hours)     Procedure Component Value Units Date/Time    XR Chest 1 View [[id number]] Collected:  01/21/17 1556     Updated:  01/21/17 1602    Narrative:       XR CHEST 1 VW-     HISTORY: Male who is 71 years-old,  pneumothorax     TECHNIQUE: Frontal view of the chest     COMPARISON: 01/20/2017     FINDINGS: Left chest tube remains in place. Heart, mediastinum and  pulmonary vasculature are unremarkable. Persistent right basilar opacity  is noted. No pleural effusion. Left apical pneumothorax is mildly  larger, measured at 10 mm, previously measured at 2 to 3 mm. Pulmonary  hyperinflation suggests COPD. No acute osseous process.       Impression:       Small left apical pneumothorax measures larger since the  prior exam, continued follow-up advised.        Discussed by telephone with the patient's nurse, Ilana, at time of  interpretation, 1600 1/21/2017.        This report was finalized on 1/21/2017 3:59 PM by Dr. Yuniel Murillo MD.             Chest x-ray reviewed by basilar atelectasis much improved chest tube on the left present small left apical pneumothorax this morning that was unchanged this afternoon after the tube being clamped for several hours.  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   • Essential hypertension I10   • Renal cyst N28.1   • Acute posthemorrhagic anemia D62   • Chronic coronary artery disease I25.10   • Abnormal result of cardiovascular function study R94.30   • Cholelithiasis K80.20   • Chronic obstructive bronchitis J44.9   • Current smoker F17.200   • AAA (abdominal aortic aneurysm) I71.4   • Angina of effort I20.8   • Coronary artery disease involving native coronary artery of native heart I25.10       Impression #1 status post 1/17/17 coronary bypass graft ×4 vessel with LIMA and AER  #2 severe COPD doing well should  go home on a combination long-acting beta agonist long-acting anticholinergic agent such as Stiolto or Anoro    # tobacco abuse we discussed cessation and options.  Again.  #3 small left apical pneumothorax decreased today and looks like stable with chest tube clamped hopefully chest tube will come out tomorrow  #4 paroxysmal A. fib now in sinus rhythm    #5 peripheral arterial disease  #6 anemia expected postop blood loss  #7 thrombocytopenia expected postop line #8 atelectasis continue pulmonary hygiene  #8 right lung base density probably atelectasis but should be followed up to ensure it clears    Plan for disposition:    Will Hanley MD  01/23/17  9:53 PM    Time:

## 2017-01-24 NOTE — PROGRESS NOTES
[facility]  Cardiology Progress Note    Patient Identification:  Name: Conor Faustin  Age: 71 y.o.  Sex: male  :  1945  MRN: [id number]                 Follow Up / Chief Complaint: Postop follow-up for the coronary artery disease    Interval History:  71 years old white male with known history of the coronary artery disease with a status post coronary artery bypass Surgery here for the follow-up denies any chest pains on     Subjective:  Minimal incisional discomfort.  Denies palpitations, dizziness or shortness of breath.  Appetite is good    Objective:  Maintaining sinus rhythm.  Coumadin was started today by cardiothoracic surgery.  Still has one chest tube.  Blood pressure is stable.    Past Medical History:  Past Medical History   Diagnosis Date   • AAA (abdominal aortic aneurysm)    • Benign essential hypertension    • COPD (chronic obstructive pulmonary disease)    • Coronary artery disease    • Renal cyst      RIGHT   • Smoker      1 PPD     Past Surgical History:  Past Surgical History   Procedure Laterality Date   • Cardiac catheterization     • Peripheral arterial stent graft     • Coronary angioplasty     • Cardiac catheterization N/A 2016     Procedure: Left Heart Cath;  Surgeon: Irena Hale MD;  Location: [facility] INVASIVE LOCATION;  Service:    • Cardiac catheterization     • Coronary artery bypass graft N/A 2017     Procedure: INTRAOPERATIVE THIAGO, MIDLINE STERNOTOMY, CORONARY ARTERY BYPASS GRAFTING X 4 UTILIZING LEFT INTERNAL MAMMARY ARTERY AND RIGHT ENDOSCOPICALLY HARVESTED GREATER SAPHENOUS VEIN, AORTIC VALVE REPLACEMENT;  Surgeon: Jassi De Jesus MD;  Location: [facility] OR;  Service:         Social History:   Social History   Substance Use Topics   • Smoking status: Current Every Day Smoker     Packs/day: 1.00     Years: 64.00   • Smokeless tobacco: Never Used   • Alcohol use No      Family History:  Family History   Problem Relation Age of  Onset   • No Known Problems Mother    • No Known Problems Father    • No Known Problems Sister    • No Known Problems Brother    • No Known Problems Maternal Aunt    • No Known Problems Maternal Uncle    • No Known Problems Paternal Aunt    • No Known Problems Paternal Uncle    • No Known Problems Maternal Grandmother    • No Known Problems Maternal Grandfather    • Heart disease Paternal Grandmother    • Stroke Paternal Grandmother    • No Known Problems Paternal Grandfather           Allergies:  Allergies   Allergen Reactions   • Bisoprolol Angioedema   • Clarithromycin Swelling     Scheduled Meds:    aspirin 81 mg Daily   atorvastatin 40 mg Nightly   budesonide-formoterol 2 puff BID - RT   enoxaparin 40 mg Daily   furosemide 40 mg Daily   guaiFENesin 1,200 mg BID   insulin aspart 0-9 Units 4x Daily AC & at Bedtime   metoprolol tartrate 25 mg Q12H   mupirocin 1 application Daily   pantoprazole 40 mg BID AC   potassium chloride 20 mEq Daily   sennosides-docusate sodium 2 tablet Nightly   tiotropium 1 capsule Daily - RT           INTAKE AND OUTPUT:    Intake/Output Summary (Last 24 hours) at 01/24/17 1453  Last data filed at 01/24/17 0431   Gross per 24 hour   Intake    720 ml   Output    110 ml   Net    610 ml       Review of Systems:   GI: No nausea vomiting  Cardiac: No chest pain or palpitations  Pulmonary: No increased work of breathing at rest    Constitutional:  Temp:  [97.2 °F (36.2 °C)-99 °F (37.2 °C)] 98 °F (36.7 °C)  Heart Rate:  [68-79] 78  Resp:  [16-18] 16  BP: (112-155)/(7-71) 112/71    Physical Exam by Irena Hale MD  General:  Sitting up at bedside Appears in no acute distress  Eyes: PERTL,  HEENT:  . Thyroid not visibly enlarged. No mucosal  cyanosis  Respiratory: Respirations regular and unlabored at rest. BBS with interest air entry in L>R bases. No crackles or wheezes auscultated  CT-> gravity  Cardiovascular: S1S2 Regular rate and rhythm. rub or gallop.  No pretibial pitting edema  with TEDs  Gastrointestinal: Abdomen soft, fnon tender. Bowel sounds present.   Musculoskeletal: MANNING x4. No abnormal movements  Extremities: No digital c cyanosis  Skin: Color pink. Skin warm and dry to touch. No rashes    Neuro: AAO x3 CN II-XII grossly intact  Psych: Mood and affect normal, pleasant and cooperative          Cardiographics  Telemetry:     ECG 1-23-17:       Echocardiogram :   · All left ventricular wall segments contract normally.  · Left ventricular diastolic dysfunction (grade I) consistent with impaired relaxation.  · Left atrial cavity size is borderline dilated.  · Mild tricuspid valve regurgitation is present.  · Left Ventricle: Calculated EF = 64.3%  · There is no evidence of pericardial effusion.      Lab Review       Results from last 7 days  Lab Units 01/24/17  0324   SODIUM mmol/L 137   POTASSIUM mmol/L 3.7   BUN mg/dL 19   CREATININE mg/dL 1.04   CALCIUM mg/dL 8.6       Results from last 7 days  Lab Units 01/24/17  0324 01/23/17  0411 01/22/17  0349   INR  1.10 1.20* 1.17*         Assessment:  - CAD  - s/p 4v CABG -> CTS  - left apical pneumothorax -> CTS  - post op PAF  - EF 60-65%  - HTN  - COPD      Plan:  - s/p 4v CABG  - chest tube remains due to small left apical pneumothorax    - post op PAF -> SR. on beta blocker . Coumadin started today per cardiothoracic surgery    - HTN - controlled on beta blocker      Coumadin as per cardiothoracic surgery.  Start low-dose Lasix and potassium replacement.  Encouraged incentive spirometry with cough and deep breathing.  BNP and BMP ordered for a.m.    I reviewed the patient's new clinical results and treatment plan   I personally viewed and interpreted the patient's EKG/Telemetry data    )1/24/2017  Irena Hale MD      EMR Dragon/Transcription disclaimer:   Much of this encounter note is an electronic transcription/translation of spoken language to printed text. The electronic translation of spoken language may permit  erroneous, or at times, nonsensical words or phrases to be inadvertently transcribed; Although I have reviewed the note for such errors, some may still exist.

## 2017-01-24 NOTE — PROGRESS NOTES
" LOS: 7 days   Patient Care Team:  Jonathan García MD as PCP - General (Internal Medicine)    Chief Complaint: F/u CABG    Subjective     Doing better, incisional pain with coughing       Vital Signs  Temp:  [97.2 °F (36.2 °C)-99 °F (37.2 °C)] 97.7 °F (36.5 °C)  Heart Rate:  [68-77] 77  Resp:  [16-18] 16  BP: (123-155)/(60-70) 155/62  Body mass index is 32.57 kg/(m^2).    Intake/Output Summary (Last 24 hours) at 01/24/17 1047  Last data filed at 01/24/17 0431   Gross per 24 hour   Intake    720 ml   Output    110 ml   Net    610 ml        Chest tube drainage last 12 hours: 60cc no air leak, CXR with stable apical pneumo     Last 3 weights    01/22/17  0700 01/23/17  0408 01/24/17  0437   Weight: 228 lb 6.4 oz (104 kg) 230 lb (104 kg) 227 lb (103 kg)         Objective:  General Appearance:  Comfortable and in no acute distress.    Vital signs: (most recent): Blood pressure 155/62, pulse 77, temperature 97.7 °F (36.5 °C), temperature source Oral, resp. rate 16, height 70\" (177.8 cm), weight 227 lb (103 kg), SpO2 95 %.  Vital signs are normal.    Lungs:  Normal respiratory rate and normal effort.  There are decreased breath sounds.    Heart: Normal rate.  Regular rhythm.  S1 normal and S2 normal.  (Tele: SR)  Abdomen: Abdomen is soft.    Extremities: Normal range of motion.    Neurological: Patient is alert and oriented to person, place and time.    Skin:  Warm and dry.  (Incision wnl)          Results Review:    WBC WBC   Date Value Ref Range Status   01/22/2017 11.01 (H) 4.50 - 10.70 10*3/mm3 Final      HGB HEMOGLOBIN   Date Value Ref Range Status   01/22/2017 13.5 (L) 13.7 - 17.6 g/dL Final      HCT HEMATOCRIT   Date Value Ref Range Status   01/22/2017 41.5 40.4 - 52.2 % Final      Platelets PLATELETS   Date Value Ref Range Status   01/22/2017 166 140 - 500 10*3/mm3 Final        PT/INR:    PROTIME   Date Value Ref Range Status   01/24/2017 13.7 11.7 - 14.2 Seconds Final   01/23/2017 14.8 (H) 11.7 - 14.2 Seconds " Final   01/22/2017 14.5 (H) 11.7 - 14.2 Seconds Final   /  INR   Date Value Ref Range Status   01/24/2017 1.10 0.90 - 1.10 Final   01/23/2017 1.20 (H) 0.90 - 1.10 Final   01/22/2017 1.17 (H) 0.90 - 1.10 Final       Sodium SODIUM   Date Value Ref Range Status   01/24/2017 137 136 - 145 mmol/L Final   01/23/2017 136 136 - 145 mmol/L Final   01/22/2017 137 136 - 145 mmol/L Final      Potassium POTASSIUM   Date Value Ref Range Status   01/24/2017 3.7 3.5 - 5.2 mmol/L Final   01/23/2017 4.1 3.5 - 5.2 mmol/L Final   01/22/2017 4.0 3.5 - 5.2 mmol/L Final   01/22/2017 3.6 3.5 - 5.2 mmol/L Final      Chloride CHLORIDE   Date Value Ref Range Status   01/24/2017 98 98 - 107 mmol/L Final   01/23/2017 97 (L) 98 - 107 mmol/L Final   01/22/2017 99 98 - 107 mmol/L Final      Bicarbonate CO2   Date Value Ref Range Status   01/24/2017 27.0 22.0 - 29.0 mmol/L Final   01/23/2017 26.5 22.0 - 29.0 mmol/L Final   01/22/2017 28.8 22.0 - 29.0 mmol/L Final      BUN BUN   Date Value Ref Range Status   01/24/2017 19 8 - 23 mg/dL Final   01/23/2017 21 8 - 23 mg/dL Final   01/22/2017 23 8 - 23 mg/dL Final      Creatinine CREATININE   Date Value Ref Range Status   01/24/2017 1.04 0.76 - 1.27 mg/dL Final   01/23/2017 1.06 0.76 - 1.27 mg/dL Final   01/22/2017 1.11 0.76 - 1.27 mg/dL Final      Calcium CALCIUM   Date Value Ref Range Status   01/24/2017 8.6 8.6 - 10.5 mg/dL Final   01/23/2017 8.6 8.6 - 10.5 mg/dL Final   01/22/2017 8.3 (L) 8.6 - 10.5 mg/dL Final      Magnesium No results found for: MG         aspirin 81 mg Oral Daily   atorvastatin 40 mg Oral Nightly   budesonide-formoterol 2 puff Inhalation BID - RT   enoxaparin 40 mg Subcutaneous Daily   furosemide 40 mg Oral Daily   guaiFENesin 1,200 mg Oral BID   insulin aspart 0-9 Units Subcutaneous 4x Daily AC & at Bedtime   metoprolol tartrate 25 mg Oral Q12H   mupirocin 1 application Each Nare Daily   pantoprazole 40 mg Oral BID AC   potassium chloride 20 mEq Oral Daily   sennosides-docusate  sodium 2 tablet Oral Nightly   tiotropium 1 capsule Inhalation Daily - RT              Patient Active Problem List   Diagnosis Code   • 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   • Essential hypertension I10   • Renal cyst N28.1   • Acute posthemorrhagic anemia D62   • Chronic coronary artery disease I25.10   • Abnormal result of cardiovascular function study R94.30   • Cholelithiasis K80.20   • Chronic obstructive bronchitis J44.9   • Current smoker F17.200   • AAA (abdominal aortic aneurysm) I71.4   • Angina of effort I20.8   • Coronary artery disease involving native coronary artery of native heart I25.10       Assessment & Plan     -Severe multivessel CAD  -NL LV systolic function, EF 64%  -POD#7 CABG x 4 w/LIMA & AVR (pericardial)  -Expected post op acute blood loss anemia, stable  -Thrombocytopenia, stable  -HTN-stable  -COPD->1/12/17 PFT's showed severe airway obstruction -> pulmonary following  -Persistent tobacco abuse  -AAA  -PAD, s/p stent to lower extremity  -Mildly elevated creatinine, improved; good urine output, will monitor  -Post op PAF, QTC prolonged with amio>d'cd>coumadin started 1/20, currently SR no more PAF last 24 hours, will dose coumadin daily       -CXR with stable pneumo after chest tube removal this am  -INR 1.1  -SR this am with HR 78, no PAF reported for 48 hours  -will discuss coumadin dose with Dr. De Jesus  Encourage IS and ambulation  Home soon    HOME Nathan  01/24/17  10:47 AM

## 2017-01-24 NOTE — PLAN OF CARE
Problem: Patient Care Overview (Adult)  Goal: Plan of Care Review  Outcome: Ongoing (interventions implemented as appropriate)    01/24/17 0323   Coping/Psychosocial Response Interventions   Plan Of Care Reviewed With patient;spouse   Patient Care Overview   Progress improving   Outcome Evaluation   Outcome Summary/Follow up Plan Patient on RA and in SR. Continue to monitor chest tube and small pneumo. Encourage IS.        Goal: Adult Individualization and Mutuality  Outcome: Ongoing (interventions implemented as appropriate)  Goal: Discharge Needs Assessment  Outcome: Ongoing (interventions implemented as appropriate)    Problem: Cardiac Surgery (Adult)  Goal: Signs and Symptoms of Listed Potential Problems Will be Absent or Manageable (Cardiac Surgery)  Outcome: Ongoing (interventions implemented as appropriate)    01/24/17 0323   Cardiac Surgery   Problems Assessed (Cardiac Surgery) all   Problems Present (Cardiac Surgery) situational response         Problem: Fall Risk (Adult)  Goal: Absence of Falls  Outcome: Ongoing (interventions implemented as appropriate)    01/24/17 0323   Fall Risk (Adult)   Absence of Falls making progress toward outcome         Problem: Skin Integrity Impairment, Risk/Actual (Adult)  Goal: Skin Integrity/Wound Healing  Outcome: Ongoing (interventions implemented as appropriate)    01/24/17 0323   Skin Integrity Impairment, Risk/Actual (Adult)   Skin Integrity/Wound Healing making progress toward outcome

## 2017-01-25 ENCOUNTER — APPOINTMENT (OUTPATIENT)
Dept: GENERAL RADIOLOGY | Facility: HOSPITAL | Age: 72
End: 2017-01-25

## 2017-01-25 VITALS
TEMPERATURE: 98 F | DIASTOLIC BLOOD PRESSURE: 70 MMHG | BODY MASS INDEX: 32.47 KG/M2 | RESPIRATION RATE: 18 BRPM | HEART RATE: 68 BPM | WEIGHT: 226.8 LBS | HEIGHT: 70 IN | OXYGEN SATURATION: 95 % | SYSTOLIC BLOOD PRESSURE: 128 MMHG

## 2017-01-25 LAB
ANION GAP SERPL CALCULATED.3IONS-SCNC: 12.8 MMOL/L
BUN BLD-MCNC: 18 MG/DL (ref 8–23)
BUN/CREAT SERPL: 14.6 (ref 7–25)
CALCIUM SPEC-SCNC: 8.1 MG/DL (ref 8.6–10.5)
CHLORIDE SERPL-SCNC: 102 MMOL/L (ref 98–107)
CO2 SERPL-SCNC: 24.2 MMOL/L (ref 22–29)
CREAT BLD-MCNC: 1.23 MG/DL (ref 0.76–1.27)
GFR SERPL CREATININE-BSD FRML MDRD: 58 ML/MIN/1.73
GLUCOSE BLD-MCNC: 143 MG/DL (ref 65–99)
GLUCOSE BLDC GLUCOMTR-MCNC: 112 MG/DL (ref 70–130)
GLUCOSE BLDC GLUCOMTR-MCNC: 136 MG/DL (ref 70–130)
INR PPP: 1.21 (ref 0.9–1.1)
POTASSIUM BLD-SCNC: 3.6 MMOL/L (ref 3.5–5.2)
POTASSIUM BLD-SCNC: 3.7 MMOL/L (ref 3.5–5.2)
POTASSIUM BLD-SCNC: 3.8 MMOL/L (ref 3.5–5.2)
PROTHROMBIN TIME: 14.9 SECONDS (ref 11.7–14.2)
SODIUM BLD-SCNC: 139 MMOL/L (ref 136–145)

## 2017-01-25 PROCEDURE — 94640 AIRWAY INHALATION TREATMENT: CPT

## 2017-01-25 PROCEDURE — 80048 BASIC METABOLIC PNL TOTAL CA: CPT | Performed by: NURSE PRACTITIONER

## 2017-01-25 PROCEDURE — 82962 GLUCOSE BLOOD TEST: CPT

## 2017-01-25 PROCEDURE — 85610 PROTHROMBIN TIME: CPT | Performed by: NURSE PRACTITIONER

## 2017-01-25 PROCEDURE — 25010000002 ENOXAPARIN PER 10 MG: Performed by: THORACIC SURGERY (CARDIOTHORACIC VASCULAR SURGERY)

## 2017-01-25 PROCEDURE — 84132 ASSAY OF SERUM POTASSIUM: CPT | Performed by: THORACIC SURGERY (CARDIOTHORACIC VASCULAR SURGERY)

## 2017-01-25 PROCEDURE — 71020 HC CHEST PA AND LATERAL: CPT

## 2017-01-25 PROCEDURE — 93005 ELECTROCARDIOGRAM TRACING: CPT | Performed by: THORACIC SURGERY (CARDIOTHORACIC VASCULAR SURGERY)

## 2017-01-25 PROCEDURE — 93010 ELECTROCARDIOGRAM REPORT: CPT | Performed by: INTERNAL MEDICINE

## 2017-01-25 RX ORDER — ACETAMINOPHEN 325 MG/1
650 TABLET ORAL EVERY 4 HOURS PRN
Refills: 0 | COMMUNITY
Start: 2017-01-25 | End: 2017-02-27

## 2017-01-25 RX ORDER — ATORVASTATIN CALCIUM 40 MG/1
TABLET, FILM COATED ORAL
Qty: 90 TABLET | Refills: 0 | OUTPATIENT
Start: 2017-01-25

## 2017-01-25 RX ORDER — ASPIRIN 81 MG/1
81 TABLET ORAL DAILY
COMMUNITY
Start: 2017-01-25 | End: 2017-03-22 | Stop reason: SDUPTHER

## 2017-01-25 RX ORDER — HYDROCODONE BITARTRATE AND ACETAMINOPHEN 5; 325 MG/1; MG/1
2 TABLET ORAL EVERY 4 HOURS PRN
Qty: 90 TABLET | Refills: 0 | Status: SHIPPED | OUTPATIENT
Start: 2017-01-25 | End: 2017-01-27

## 2017-01-25 RX ORDER — ATORVASTATIN CALCIUM 40 MG/1
40 TABLET, FILM COATED ORAL NIGHTLY
Qty: 30 TABLET | Refills: 0 | Status: SHIPPED | OUTPATIENT
Start: 2017-01-25 | End: 2017-03-22 | Stop reason: SDUPTHER

## 2017-01-25 RX ADMIN — MUPIROCIN 1 APPLICATION: 20 OINTMENT TOPICAL at 08:46

## 2017-01-25 RX ADMIN — POTASSIUM CHLORIDE 20 MEQ: 750 CAPSULE, EXTENDED RELEASE ORAL at 00:14

## 2017-01-25 RX ADMIN — ASPIRIN 81 MG: 81 TABLET ORAL at 08:46

## 2017-01-25 RX ADMIN — POTASSIUM CHLORIDE 20 MEQ: 750 CAPSULE, EXTENDED RELEASE ORAL at 08:46

## 2017-01-25 RX ADMIN — FUROSEMIDE 40 MG: 40 TABLET ORAL at 08:46

## 2017-01-25 RX ADMIN — ENOXAPARIN SODIUM 40 MG: 40 INJECTION SUBCUTANEOUS at 08:45

## 2017-01-25 RX ADMIN — GUAIFENESIN 1200 MG: 600 TABLET, EXTENDED RELEASE ORAL at 08:46

## 2017-01-25 RX ADMIN — METOPROLOL TARTRATE 25 MG: 25 TABLET ORAL at 08:46

## 2017-01-25 RX ADMIN — POTASSIUM CHLORIDE 20 MEQ: 750 CAPSULE, EXTENDED RELEASE ORAL at 04:08

## 2017-01-25 RX ADMIN — BUDESONIDE AND FORMOTEROL FUMARATE DIHYDRATE 2 PUFF: 160; 4.5 AEROSOL RESPIRATORY (INHALATION) at 11:32

## 2017-01-25 RX ADMIN — PANTOPRAZOLE SODIUM 40 MG: 40 TABLET, DELAYED RELEASE ORAL at 06:47

## 2017-01-25 NOTE — PLAN OF CARE
Problem: Patient Care Overview (Adult)  Goal: Plan of Care Review  Outcome: Ongoing (interventions implemented as appropriate)  Goal: Adult Individualization and Mutuality  Outcome: Ongoing (interventions implemented as appropriate)  Goal: Discharge Needs Assessment  Outcome: Ongoing (interventions implemented as appropriate)    Problem: Cardiac Surgery (Adult)  Goal: Signs and Symptoms of Listed Potential Problems Will be Absent or Manageable (Cardiac Surgery)  Outcome: Ongoing (interventions implemented as appropriate)    Problem: Fall Risk (Adult)  Goal: Absence of Falls  Outcome: Ongoing (interventions implemented as appropriate)    Problem: Skin Integrity Impairment, Risk/Actual (Adult)  Goal: Identify Related Risk Factors and Signs and Symptoms  Outcome: Ongoing (interventions implemented as appropriate)  Goal: Skin Integrity/Wound Healing  Outcome: Ongoing (interventions implemented as appropriate)

## 2017-01-25 NOTE — PROGRESS NOTES
LOS: 7 days   Patient Care Team:  Jonathan García MD as PCP - General (Internal Medicine)        Subjective: Feeling much better today than yesterday he is joking and reactive looks better metabolic complaints today    Objective     Vital Signs  Temp:  [97.2 °F (36.2 °C)-98.3 °F (36.8 °C)] 98 °F (36.7 °C)  Heart Rate:  [69-79] 72  Resp:  [16-18] 16  BP: (112-155)/(7-71) 135/66  Body mass index is 32.57 kg/(m^2).    Intake/Output Summary (Last 24 hours) at 01/24/17 2121  Last data filed at 01/24/17 1730   Gross per 24 hour   Intake    840 ml   Output     60 ml   Net    780 ml          Physical Exam:  General Appearance: White male resting comfortably in bed does not appear in any acute distress  Eyes: Conjunctiva clear and anicteric  ENT: This membranes are moist no erythema or exudates Mallampati type I airway, nasal septum midline  Neck: Adenopathy or thyromegaly no jugular venous distention trachea midline  Lungs: Clear no wheezes rales or rhonchi he is nonlabored chest expansion is symmetric chest tube is clamped  Cardiac: Regular rhythm no murmur  Abdomen: Palpable organomegaly  : Not examined  Musc/Skel: Normal  Skin: No jaundice no petechiae  Neuro: Oriented cooperative grossly intact  Extremities/P Vascular: No clubbing or cyanosis trace ankle edema bilaterally palpable radial dorsalis pedis pulses  MSE: Very flat depressed appearing today        Labs:  WBC No results found for: WBCS   HGB HEMOGLOBIN   Date Value Ref Range Status   01/22/2017 13.5 (L) 13.7 - 17.6 g/dL Final      HCT HEMATOCRIT   Date Value Ref Range Status   01/22/2017 41.5 40.4 - 52.2 % Final      Platlets No results found for: LABPLAT     PT/INR:    PROTIME   Date Value Ref Range Status   01/24/2017 13.7 11.7 - 14.2 Seconds Final   01/23/2017 14.8 (H) 11.7 - 14.2 Seconds Final   01/22/2017 14.5 (H) 11.7 - 14.2 Seconds Final   /  INR   Date Value Ref Range Status   01/24/2017 1.10 0.90 - 1.10 Final   01/23/2017 1.20 (H) 0.90 - 1.10  Final   01/22/2017 1.17 (H) 0.90 - 1.10 Final       Sodium SODIUM   Date Value Ref Range Status   01/24/2017 137 136 - 145 mmol/L Final   01/23/2017 136 136 - 145 mmol/L Final   01/22/2017 137 136 - 145 mmol/L Final      Potassium POTASSIUM   Date Value Ref Range Status   01/24/2017 3.7 3.5 - 5.2 mmol/L Final   01/23/2017 4.1 3.5 - 5.2 mmol/L Final   01/22/2017 4.0 3.5 - 5.2 mmol/L Final   01/22/2017 3.6 3.5 - 5.2 mmol/L Final      Chloride CHLORIDE   Date Value Ref Range Status   01/24/2017 98 98 - 107 mmol/L Final   01/23/2017 97 (L) 98 - 107 mmol/L Final   01/22/2017 99 98 - 107 mmol/L Final      Bicarbonate No results found for: PLASMABICARB   BUN BUN   Date Value Ref Range Status   01/24/2017 19 8 - 23 mg/dL Final   01/23/2017 21 8 - 23 mg/dL Final   01/22/2017 23 8 - 23 mg/dL Final      Creatinine CREATININE   Date Value Ref Range Status   01/24/2017 1.04 0.76 - 1.27 mg/dL Final   01/23/2017 1.06 0.76 - 1.27 mg/dL Final   01/22/2017 1.11 0.76 - 1.27 mg/dL Final      Calcium CALCIUM   Date Value Ref Range Status   01/24/2017 8.6 8.6 - 10.5 mg/dL Final   01/23/2017 8.6 8.6 - 10.5 mg/dL Final   01/22/2017 8.3 (L) 8.6 - 10.5 mg/dL Final      Magnesium No results found for: MG         pH No results found for: PHART   pO2 No results found for: PO2ART   pCO2 No results found for: JXH5XBV   HCO3 No results found for: FLE9REJ       aspirin 81 mg Oral Daily   atorvastatin 40 mg Oral Nightly   budesonide-formoterol 2 puff Inhalation BID - RT   enoxaparin 40 mg Subcutaneous Daily   furosemide 40 mg Oral Daily   guaiFENesin 1,200 mg Oral BID   insulin aspart 0-9 Units Subcutaneous 4x Daily AC & at Bedtime   metoprolol tartrate 25 mg Oral Q12H   mupirocin 1 application Each Nare Daily   pantoprazole 40 mg Oral BID AC   potassium chloride 20 mEq Oral Daily   sennosides-docusate sodium 2 tablet Oral Nightly   tiotropium 1 capsule Inhalation Daily - RT          Diagnostics:  Imaging Results (last 24 hours)     Procedure  Component Value Units Date/Time    XR Chest 1 View [[id number]] Collected:  01/21/17 1556     Updated:  01/21/17 1602    Narrative:       XR CHEST 1 VW-     HISTORY: Male who is 71 years-old,  pneumothorax     TECHNIQUE: Frontal view of the chest     COMPARISON: 01/20/2017     FINDINGS: Left chest tube remains in place. Heart, mediastinum and  pulmonary vasculature are unremarkable. Persistent right basilar opacity  is noted. No pleural effusion. Left apical pneumothorax is mildly  larger, measured at 10 mm, previously measured at 2 to 3 mm. Pulmonary  hyperinflation suggests COPD. No acute osseous process.       Impression:       Small left apical pneumothorax measures larger since the  prior exam, continued follow-up advised.        Discussed by telephone with the patient's nurse, Ilana, at time of  interpretation, 1600 1/21/2017.        This report was finalized on 1/21/2017 3:59 PM by Dr. Yuniel Murillo MD.             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   • Essential hypertension I10   • Renal cyst N28.1   • Acute posthemorrhagic anemia D62   • Chronic coronary artery disease I25.10   • Abnormal result of cardiovascular function study R94.30   • Cholelithiasis K80.20   • Chronic obstructive bronchitis J44.9   • Current smoker F17.200   • AAA (abdominal aortic aneurysm) I71.4   • Angina of effort I20.8   • Coronary artery disease involving native coronary artery of native heart I25.10       Impression #1 status post 1/17/17 coronary bypass graft ×4 vessel with LIMA and AER  #2 severe COPD doing well should go home on a combination long-acting beta agonist long-acting anticholinergic agent such as Stiolto or Anoro ordered for discharge   # tobacco abuse we discussed cessation and options.  Again.  #3 small left apical pneumothorax  decreasing in size   #5 peripheral arterial disease  #6 anemia expected postop blood loss  #7 thrombocytopenia expected postop l   #8 atelectasis continue pulmonary hygiene improved  #8 right lung base density probably atelectasis but should be followed up to ensure it clears to have a chest x-ray in about 4-6 weeks.  He can do routine cardiac follow-up R his primary care physician are certainly is welcome to come be seen in our office.    Plan for disposition:    Will Hanley MD  01/24/17  9:21 PM    Time:

## 2017-01-25 NOTE — PLAN OF CARE
Problem: Patient Care Overview (Adult)  Goal: Plan of Care Review  Outcome: Ongoing (interventions implemented as appropriate)    Problem: Cardiac Surgery (Adult)  Goal: Signs and Symptoms of Listed Potential Problems Will be Absent or Manageable (Cardiac Surgery)  Outcome: Ongoing (interventions implemented as appropriate)    Problem: Fall Risk (Adult)  Goal: Absence of Falls  Outcome: Ongoing (interventions implemented as appropriate)

## 2017-01-25 NOTE — DISCHARGE SUMMARY
Date of Admission:   Date of Discharge:  1/25/2017    Discharge Diagnosis: CAD, moderate to sever aortic valve regurgitaition,  Post  CABG/AVR    Presenting Problem/History of Present Illness  Coronary artery disease involving native coronary artery of native heart, angina presence unspecified [I25.10]  Coronary artery disease involving native coronary artery of native heart, angina presence unspecified [I25.10]     Procedures Performed  1. Elective coronary artery bypass graft x4 with left internal mammary artery to left anterior descending and reverse individual saphenous vein graft to the 1st diagonal obtuse marginal 1 and posterior descending artery.  2. Aortic valve replacement with a 25 mm Magna pericardial prosthesis.  3. Endoscopic vein harvest of the right lower extremity.    Brief History:   He is a 71 y.o. male with a history of HTN, AAA and COPD who has developed progressive angina of exertion, moderate, substernal, on minimal ambulation and improves with SL NTG. No diaphoresis or nausea. He denies syncope, presyncope, TIA, orthopnea, palpitations or LE edema. He had a positive stress test and a cath following showed a 100% LAD occlusion, D1 50%, OM 90 % and mid RCA 90%. Normal LV gram. A 2 D echo showed and EF or 64 % and valve disease. He still smokes 1 pack per day and has chronic bronchitis and suspect COPD. PFTs were done today and showed a FEV1 of 40 % to 47% with bronchodilators.    Hospital Course:  Mr. Faustin underwent a CABGx4/ tissue AVR on 1/17/17 by Dr. De Jesus. He tolerated the procedure well and was recovered in the cardiac ICU. He was extubated later same day of surgery. POD 1 he was doing well and was transferred to the tele floor. On POD 3 he had episode of Afib which converted to SR with amiodarone. However he had a prolonged Qtc and the amio was discontinued. He has maintained SR on lopressor. His chest left chest tube was not removed until POD 6 due to a persistent left apical  pneumothorax. His CXR the following day show a persistent left apical pneumo but it was smaller than prior to removing the chest tube. He had a mostly uneventful post op course and was felt stable for discharge home on POD 7      Consults:   Consults     Date and Time Order Name Status Description    1/21/2017 0943 Inpatient Consult to Cardiology      1/19/2017 1731 Inpatient Consult to Pulmonology            Pertinent Test Results:    Lab Results   Component Value Date    WBC 11.01 (H) 01/22/2017    HGB 13.5 (L) 01/22/2017    HCT 41.5 01/22/2017    MCV 91.6 01/22/2017     01/22/2017      Lab Results   Component Value Date    GLUCOSE 143 (H) 01/25/2017    CALCIUM 8.1 (L) 01/25/2017     01/25/2017    K 3.8 01/25/2017    CO2 24.2 01/25/2017     01/25/2017    BUN 18 01/25/2017    CREATININE 1.23 01/25/2017    EGFRIFNONA 58 (L) 01/25/2017    BCR 14.6 01/25/2017    ANIONGAP 12.8 01/25/2017     Lab Results   Component Value Date    INR 1.21 (H) 01/25/2017    PROTIME 14.9 (H) 01/25/2017         Condition on Discharge:  Stable in SR    Vital Signs  Temp:  [97.7 °F (36.5 °C)-98.3 °F (36.8 °C)] 97.7 °F (36.5 °C)  Heart Rate:  [68-79] 72  Resp:  [16] 16  BP: (112-135)/(7-71) 129/66    Physical Exam:  CV: RRR without m/r/g  Lungs: clear anteriorly  Abd: soft , NT,ND  Ext: no edema    Discharge Disposition  Home or Self Care    Discharge Medications   Conor Faustin   Home Medication Instructions NOREEN:[id number]    Printed on:01/25/17 1100   Medication Information                      acetaminophen (TYLENOL) 325 MG tablet  Take 2 tablets by mouth Every 4 (Four) Hours As Needed for mild pain (1-3).             aspirin 81 MG EC tablet  Take 1 tablet by mouth Daily.             atorvastatin (LIPITOR) 40 MG tablet  Take 1 tablet by mouth Every Night.             Coenzyme Q10 (COQ10) 100 MG capsule  Take 400 mg by mouth Daily.             HYDROcodone-acetaminophen (NORCO) 5-325 MG per tablet  Take 2 tablets by  mouth Every 4 (Four) Hours As Needed for moderate pain (4-6) for up to 2 days.             metoprolol tartrate (LOPRESSOR) 25 MG tablet  Take 1 tablet by mouth Every 12 (Twelve) Hours.             MULTIPLE VITAMINS ESSENTIAL PO  Take  by mouth.             nitroglycerin (NITROSTAT) 0.4 MG SL tablet  Place 0.4 mg under the tongue Every 5 (Five) Minutes As Needed for chest pain. Take no more than 3 doses in 15 minutes.             Tiotropium Bromide-Olodaterol (STIOLTO RESPIMAT) 2.5-2.5 MCG/ACT aerosol solution  Inhale 2 inhalers Daily.                 Discharge Diet:     Activity at Discharge:   Activity Instructions     Activity as Tolerated                     Follow-up Appointments  No future appointments.  Referrals and Follow-ups to Schedule     Follow-Up    As directed    F/u with Dr. De Jesus 4-6 weeks   Follow Up Details:  Dr. De Jesus       Referral to Home Health    As directed    Face to Face Visit Date:  1/25/2017   Follow-up Provider for Plan of Care?:  I treated the patient in an acute care facility and will not continue treatment after discharge.   Follow-up Provider:  YAIMA DE JESUS   Reason/Clinical Findings:  postop cabg   Describe mobility limitations that make leaving home difficult:  post op cabg   Nursing/Therapeutic Services Requested:  Skilled Nursing   Skilled nursing orders:  Post CABG care   Frequency:  1 Week 1                 Test Results Pending at Discharge       HOME Nathan  01/25/17  11:02 AM

## 2017-01-26 LAB
ABO + RH BLD: NORMAL
ABO + RH BLD: NORMAL
BH BB BLOOD EXPIRATION DATE: NORMAL
BH BB BLOOD EXPIRATION DATE: NORMAL
BH BB BLOOD TYPE BARCODE: 5100
BH BB BLOOD TYPE BARCODE: 5100
BH BB DISPENSE STATUS: NORMAL
BH BB DISPENSE STATUS: NORMAL
BH BB PRODUCT CODE: NORMAL
BH BB PRODUCT CODE: NORMAL
BH BB UNIT NUMBER: NORMAL
BH BB UNIT NUMBER: NORMAL
CROSSMATCH INTERPRETATION: NORMAL
CROSSMATCH INTERPRETATION: NORMAL
UNIT  ABO: NORMAL
UNIT  ABO: NORMAL
UNIT  RH: NORMAL
UNIT  RH: NORMAL

## 2017-02-11 ENCOUNTER — DOCUMENTATION (OUTPATIENT)
Dept: CARDIOLOGY | Facility: HOSPITAL | Age: 72
End: 2017-02-11

## 2017-02-11 NOTE — PROGRESS NOTES
Received phone call from Mr. Faustin's wife through answering service.  Phoned patient back and spoke with wife.  She stated pt has been having runny nose and sneezing for the past few days. She was wanting advise as to what OTC allergy medication to give.  She denies her  is having any fever, chills, SHOB, or cough.  She was unable to get in touch with her PCP.    Pt. Has Chlorpheniramine (1st generation) antihistamine OTC from [employer] at home.      Instructed pt that she may use above stated antihistamine but cautioned her that it may cause some drowsiness and that if this was significant she should use zyrtec (cetirizine) OTC 10 mg once daily as it is less sedating. She was informed not give pt any decongestant such as sudaphed  d/t pt's hx of Afib.     Pt was instructed to contact PCP on Monday if symptoms not improved.  She was instructed should any fever, chills, shob, fast heart rates, or other cardiovascular issues should arise she should contact our office again.  IF any emergency issues should arise she is to call 911.    Wife verbalized understanding. And reported that [facility] was following pt as well.      PASTOR Gerardo

## 2017-02-21 RX ORDER — ATORVASTATIN CALCIUM 40 MG/1
TABLET, FILM COATED ORAL
Qty: 30 TABLET | Refills: 0 | OUTPATIENT
Start: 2017-02-21

## 2017-02-23 RX ORDER — ATORVASTATIN CALCIUM 40 MG/1
TABLET, FILM COATED ORAL
Qty: 90 TABLET | Refills: 11 | Status: SHIPPED | OUTPATIENT
Start: 2017-02-23 | End: 2017-02-27

## 2017-02-23 RX ORDER — ATORVASTATIN CALCIUM 40 MG/1
40 TABLET, FILM COATED ORAL DAILY
Qty: 30 TABLET | Refills: 11 | Status: SHIPPED | OUTPATIENT
Start: 2017-02-23 | End: 2017-02-23 | Stop reason: SDUPTHER

## 2017-02-27 ENCOUNTER — OFFICE VISIT (OUTPATIENT)
Dept: CARDIOLOGY | Facility: CLINIC | Age: 72
End: 2017-02-27

## 2017-02-27 VITALS
DIASTOLIC BLOOD PRESSURE: 65 MMHG | BODY MASS INDEX: 32.78 KG/M2 | HEIGHT: 70 IN | SYSTOLIC BLOOD PRESSURE: 123 MMHG | WEIGHT: 229 LBS | HEART RATE: 67 BPM

## 2017-02-27 DIAGNOSIS — I71.40 ABDOMINAL AORTIC ANEURYSM (AAA) WITHOUT RUPTURE (HCC): ICD-10-CM

## 2017-02-27 DIAGNOSIS — I25.10 CHRONIC CORONARY ARTERY DISEASE: ICD-10-CM

## 2017-02-27 DIAGNOSIS — I10 ESSENTIAL HYPERTENSION: Primary | ICD-10-CM

## 2017-02-27 PROCEDURE — 99213 OFFICE O/P EST LOW 20 MIN: CPT | Performed by: INTERNAL MEDICINE

## 2017-02-27 NOTE — PROGRESS NOTES
Subjective:       Conor Faustin is a 71 y.o. male who here for follow up    CC  Post CABG follow-up  HPI  71 years old white male with known history of the coronary artery disease, was having significant unstable angina, underwent coronary artery bypass graft surgery last month and is here for the follow-up, complains of chest soreness but getting better, shortness of breath on moderate exertion but getting better     Problem List Items Addressed This Visit        Cardiovascular and Mediastinum    Essential hypertension - Primary    Relevant Orders    Lipid Panel    Comprehensive Metabolic Panel    TSH    Treadmill Stress Test    Adult Transthoracic Echo Complete    Chronic coronary artery disease    Relevant Orders    Lipid Panel    Comprehensive Metabolic Panel    TSH    Treadmill Stress Test    Adult Transthoracic Echo Complete    AAA (abdominal aortic aneurysm)    Relevant Orders    Lipid Panel    Comprehensive Metabolic Panel    TSH    Treadmill Stress Test    Adult Transthoracic Echo Complete        Previous treatments/evaluations include: ASA and beta blocker. Cardiac risk factors: advanced age (older than 55 for men, 65 for women) and hypertension.    The following portions of the patient's history were reviewed and updated as appropriate: allergies, current medications, past family history, past medical history, past social history, past surgical history and problem list.    Past Medical History   Diagnosis Date   • AAA (abdominal aortic aneurysm)    • Benign essential hypertension    • COPD (chronic obstructive pulmonary disease)    • Coronary artery disease    • Renal cyst      RIGHT   • Smoker      1 PPD    reports that he has been smoking.  He has a 64.00 pack-year smoking history. He has never used smokeless tobacco. He reports that he does not drink alcohol or use illicit drugs.  Family History   Problem Relation Age of Onset   • No Known Problems Mother    • No Known Problems Father    • No  "Known Problems Sister    • No Known Problems Brother    • No Known Problems Maternal Aunt    • No Known Problems Maternal Uncle    • No Known Problems Paternal Aunt    • No Known Problems Paternal Uncle    • No Known Problems Maternal Grandmother    • No Known Problems Maternal Grandfather    • Heart disease Paternal Grandmother    • Stroke Paternal Grandmother    • No Known Problems Paternal Grandfather        Review of Systems  Constitutional: No wt loss, fever, fatigue  Gastrointestinal: No nausea, abdominal pain  Behavioral/Psych: No insomnia or anxiety   Cardiovascular chest soreness and tightness in chest and shortness of breath  Objective:       Physical Exam             Physical Exam  Visit Vitals   • /65 (BP Location: Left arm, Patient Position: Sitting)   • Pulse 67   • Ht 70\" (177.8 cm)   • Wt 229 lb (104 kg)   • BMI 32.86 kg/m2       General appearance: NAD, conversant   Eyes: anicteric sclerae, moist conjunctivae; no lid-lag; PERRLA   HENT: Atraumatic; oropharynx clear with moist mucous membranes and no mucosal ulcerations;  normal hard and soft palate   Neck: Trachea midline; FROM, supple, no thyromegaly or lymphadenopathy   Lungs: CTA, with normal respiratory effort and no intercostal retractions   CV: S1-S2 regular, sys murmurs, no rub, no gallop   Abdomen: Soft, non-tender; no masses or HSM   Extremities: No peripheral edema or extremity lymphadenopathy  Skin: Normal temperature, turgor and texture; no rash, ulcers or subcutaneous nodules   Psych: Appropriate affect, alert and oriented to person, place and time           Cardiographics  @Procedures    Echocardiogram:        Current Outpatient Prescriptions:   •  aspirin 81 MG EC tablet, Take 1 tablet by mouth Daily., Disp: , Rfl:   •  atorvastatin (LIPITOR) 40 MG tablet, Take 1 tablet by mouth Every Night., Disp: 30 tablet, Rfl: 0  •  Coenzyme Q10 (COQ10) 100 MG capsule, Take 400 mg by mouth Daily., Disp: , Rfl:   •  metoprolol tartrate " (LOPRESSOR) 25 MG tablet, Take 1 tablet by mouth Every 12 (Twelve) Hours., Disp: 60 tablet, Rfl: 0  •  MULTIPLE VITAMINS ESSENTIAL PO, Take  by mouth., Disp: , Rfl:   •  nitroglycerin (NITROSTAT) 0.4 MG SL tablet, Place 0.4 mg under the tongue Every 5 (Five) Minutes As Needed for chest pain. Take no more than 3 doses in 15 minutes., Disp: , Rfl:   •  Tiotropium Bromide-Olodaterol (STIOLTO RESPIMAT) 2.5-2.5 MCG/ACT aerosol solution, Inhale 2 inhalers Daily., Disp: 1 inhaler, Rfl: 11   Assessment:        Patient Active Problem List   Diagnosis   • Aneurysm of aortic arch   • Essential hypertension   • Renal cyst   • Acute posthemorrhagic anemia   • Chronic coronary artery disease   • Abnormal result of cardiovascular function study   • Cholelithiasis   • Chronic obstructive bronchitis   • Current smoker   • AAA (abdominal aortic aneurysm)   • Angina of effort   • Coronary artery disease involving native coronary artery of native heart               Plan:            ICD-10-CM ICD-9-CM   1. Essential hypertension I10 401.9   2. Chronic coronary artery disease I25.10 414.00   3. Abdominal aortic aneurysm (AAA) without rupture I71.4 441.4   POST CABG STABLE    Post Cabg, instructions explained    Chest soreness upto 6 months are normal    Afib/ Palpitations upto 30% upto 3 months are common    Importance of Cardiac rehab has been explained    Importance of exercise, Cholesterol management, BP and other  risk management for long term patency has been explained      FLP/ CMP/ ETT/ ECHO    SEE US 1 MONTH    COUNSELING:    Conor Zamudio was given to patient for the following topics: diagnostic results, risk factor reductions, impressions, risks and benefits of treatment options and importance of treatment compliance .       SMOKING COUNSELING:    Ready to quit: Not Answered  Counseling given: Not Answered      EMR Dragon/Transcription disclaimer:   Much of this encounter note is an electronic  transcription/translation of spoken language to printed text. The electronic translation of spoken language may permit erroneous, or at times, nonsensical words or phrases to be inadvertently transcribed; Although I have reviewed the note for such errors, some may still exist.

## 2017-03-02 ENCOUNTER — OFFICE VISIT (OUTPATIENT)
Dept: CARDIAC SURGERY | Facility: CLINIC | Age: 72
End: 2017-03-02

## 2017-03-02 VITALS
TEMPERATURE: 97.6 F | HEIGHT: 70 IN | RESPIRATION RATE: 20 BRPM | SYSTOLIC BLOOD PRESSURE: 135 MMHG | HEART RATE: 72 BPM | OXYGEN SATURATION: 99 % | WEIGHT: 223 LBS | DIASTOLIC BLOOD PRESSURE: 83 MMHG | BODY MASS INDEX: 31.92 KG/M2

## 2017-03-02 DIAGNOSIS — I25.118 CORONARY ARTERY DISEASE OF NATIVE ARTERY OF NATIVE HEART WITH STABLE ANGINA PECTORIS (HCC): Primary | ICD-10-CM

## 2017-03-02 PROCEDURE — 99024 POSTOP FOLLOW-UP VISIT: CPT | Performed by: NURSE PRACTITIONER

## 2017-03-02 NOTE — PROGRESS NOTES
3/2/2017        Subjective:      Jonathan García MD    Chief Complaint of No chief complaint on file.           Dear Dr. Jonathan García MD and Colleagues,    It was nice to see Conor Faustin in follow up today. He is status post CABG x 4 and AVR (Magna pericardial prosthesis), on 1/17/17.  He reports feeling very well since d/c from hospital. He has seen Dr. Butler in f/u (2/27/17). He has decided not to participate in cardiac rehab, stating he is exercising at home under the instruction of Dr. Butler. States, he is smoking very little since his surgery.    Patient Active Problem List   Diagnosis   • Aneurysm of aortic arch   • Essential hypertension   • Renal cyst   • Acute posthemorrhagic anemia   • Chronic coronary artery disease   • Abnormal result of cardiovascular function study   • Cholelithiasis   • Chronic obstructive bronchitis   • Current smoker   • AAA (abdominal aortic aneurysm)   • Angina of effort   • Coronary artery disease involving native coronary artery of native heart       Past Medical History   Diagnosis Date   • AAA (abdominal aortic aneurysm)    • Benign essential hypertension    • COPD (chronic obstructive pulmonary disease)    • Coronary artery disease    • Renal cyst      RIGHT   • Smoker      1 PPD       Past Surgical History   Procedure Laterality Date   • Cardiac catheterization     • Peripheral arterial stent graft     • Coronary angioplasty     • Cardiac catheterization N/A 12/12/2016     Procedure: Left Heart Cath;  Surgeon: Irena Hale MD;  Location: [facility] INVASIVE LOCATION;  Service:    • Cardiac catheterization     • Coronary artery bypass graft N/A 1/17/2017     Procedure: INTRAOPERATIVE THIAGO, MIDLINE STERNOTOMY, CORONARY ARTERY BYPASS GRAFTING X 4 UTILIZING LEFT INTERNAL MAMMARY ARTERY AND RIGHT ENDOSCOPICALLY HARVESTED GREATER SAPHENOUS VEIN, AORTIC VALVE REPLACEMENT;  Surgeon: Jassi De Jesus MD;  Location: [facility] OR;  Service:         Allergies   Allergen Reactions   • Bisoprolol Angioedema   • Clarithromycin Swelling       Review of Systems   Constitutional: Negative.    HENT: Negative.    Eyes: Negative.    Respiratory: Negative.    Cardiovascular: Negative.    Gastrointestinal: Negative.    Endocrine: Negative.    Genitourinary: Negative.    Musculoskeletal: Negative.    Skin: Negative.    Allergic/Immunologic: Negative.    Neurological: Negative.    Psychiatric/Behavioral: Negative.          Current Outpatient Prescriptions:   •  aspirin 81 MG EC tablet, Take 1 tablet by mouth Daily., Disp: , Rfl:   •  atorvastatin (LIPITOR) 40 MG tablet, Take 1 tablet by mouth Every Night., Disp: 30 tablet, Rfl: 0  •  Coenzyme Q10 (COQ10) 100 MG capsule, Take 400 mg by mouth Daily., Disp: , Rfl:   •  metoprolol tartrate (LOPRESSOR) 25 MG tablet, Take 1 tablet by mouth Every 12 (Twelve) Hours., Disp: 60 tablet, Rfl: 0  •  MULTIPLE VITAMINS ESSENTIAL PO, Take  by mouth., Disp: , Rfl:   •  nitroglycerin (NITROSTAT) 0.4 MG SL tablet, Place 0.4 mg under the tongue Every 5 (Five) Minutes As Needed for chest pain. Take no more than 3 doses in 15 minutes., Disp: , Rfl:     Social History     Social History   • Marital status:      Spouse name: N/A   • Number of children: N/A   • Years of education: N/A     Occupational History   • Not on file.     Social History Main Topics   • Smoking status: Former Smoker     Packs/day: 1.00     Years: 64.00   • Smokeless tobacco: Never Used   • Alcohol use No   • Drug use: No   • Sexual activity: Defer     Other Topics Concern   • Not on file     Social History Narrative       Family History   Problem Relation Age of Onset   • No Known Problems Mother    • No Known Problems Father    • No Known Problems Sister    • No Known Problems Brother    • No Known Problems Maternal Aunt    • No Known Problems Maternal Uncle    • No Known Problems Paternal Aunt    • No Known Problems Paternal Uncle    • No Known Problems  Maternal Grandmother    • No Known Problems Maternal Grandfather    • Heart disease Paternal Grandmother    • Stroke Paternal Grandmother    • No Known Problems Paternal Grandfather            Vital Signs:  Weight: 223 lb (101 kg)   Body mass index is 32 kg/(m^2).  Temp: 97.6 °F (36.4 °C)   Heart Rate: 72   BP: 135/83     Physical Exam   Constitutional: He is oriented to person, place, and time. He appears well-developed and well-nourished. No distress.   HENT:   Head: Normocephalic and atraumatic.   Mouth/Throat: Oropharynx is clear and moist.   Eyes: Pupils are equal, round, and reactive to light.   Neck: Normal range of motion. No JVD present.   Cardiovascular: Normal rate, regular rhythm and intact distal pulses.    Pulmonary/Chest: Effort normal and breath sounds normal. No respiratory distress. He has no wheezes.   No sternal instability noted   Abdominal: Soft. Bowel sounds are normal. There is no tenderness.   Musculoskeletal: Normal range of motion.   Neurological: He is alert and oriented to person, place, and time.   Skin: Skin is warm and dry.   Surgical incisions healing well   Psychiatric: He has a normal mood and affect. His behavior is normal. Judgment and thought content normal.        Recommendation/Plan:     Conor Faustin was seen for post operative follow up. He is doing well from a surgical standpoint. His incisions are healing well. I have released him to resume normal daily activities without restrictions. So long as he continues to follow up with Dr. Butler, he can see Dr. De Jesus on a prn basis.    Thank you for allowing me to participate in his care.    Sincerely,    PASTOR Nick

## 2017-03-13 ENCOUNTER — HOSPITAL ENCOUNTER (OUTPATIENT)
Dept: CARDIOLOGY | Facility: HOSPITAL | Age: 72
Discharge: HOME | End: 2017-03-13

## 2017-03-13 ENCOUNTER — HOSPITAL ENCOUNTER (OUTPATIENT)
Dept: CARDIOLOGY | Facility: HOSPITAL | Age: 72
Discharge: HOME | End: 2017-03-13
Attending: INTERNAL MEDICINE | Admitting: INTERNAL MEDICINE

## 2017-03-13 VITALS
HEART RATE: 81 BPM | SYSTOLIC BLOOD PRESSURE: 149 MMHG | BODY MASS INDEX: 32.13 KG/M2 | WEIGHT: 224.4 LBS | DIASTOLIC BLOOD PRESSURE: 83 MMHG | HEIGHT: 70 IN

## 2017-03-13 DIAGNOSIS — I25.118 CORONARY ARTERY DISEASE OF NATIVE ARTERY OF NATIVE HEART WITH STABLE ANGINA PECTORIS (HCC): Primary | ICD-10-CM

## 2017-03-13 DIAGNOSIS — F17.200 CURRENT SMOKER: ICD-10-CM

## 2017-03-13 DIAGNOSIS — I71.40 ABDOMINAL AORTIC ANEURYSM (AAA) WITHOUT RUPTURE (HCC): ICD-10-CM

## 2017-03-13 DIAGNOSIS — I25.10 CHRONIC CORONARY ARTERY DISEASE: ICD-10-CM

## 2017-03-13 DIAGNOSIS — I25.118 CORONARY ARTERY DISEASE OF NATIVE ARTERY OF NATIVE HEART WITH STABLE ANGINA PECTORIS (HCC): ICD-10-CM

## 2017-03-13 DIAGNOSIS — J44.9 CHRONIC OBSTRUCTIVE BRONCHITIS (HCC): ICD-10-CM

## 2017-03-13 DIAGNOSIS — R94.30 ABNORMAL RESULT OF CARDIOVASCULAR FUNCTION STUDY: ICD-10-CM

## 2017-03-13 DIAGNOSIS — I10 ESSENTIAL HYPERTENSION: ICD-10-CM

## 2017-03-13 LAB
ALBUMIN SERPL-MCNC: 4 G/DL (ref 3.5–5.2)
ALBUMIN/GLOB SERPL: 1.2 G/DL
ALP SERPL-CCNC: 102 U/L (ref 39–117)
ALT SERPL W P-5'-P-CCNC: 17 U/L (ref 1–41)
ANION GAP SERPL CALCULATED.3IONS-SCNC: 16.7 MMOL/L
AST SERPL-CCNC: 20 U/L (ref 1–40)
BH CV ECHO MEAS - ACS: 1.7 CM
BH CV ECHO MEAS - AO MAX PG (FULL): 8.7 MMHG
BH CV ECHO MEAS - AO MAX PG: 15.5 MMHG
BH CV ECHO MEAS - AO MEAN PG (FULL): 6 MMHG
BH CV ECHO MEAS - AO MEAN PG: 10 MMHG
BH CV ECHO MEAS - AO V2 MAX: 197 CM/SEC
BH CV ECHO MEAS - AO V2 MEAN: 152 CM/SEC
BH CV ECHO MEAS - AO V2 VTI: 42.1 CM
BH CV ECHO MEAS - AVA(I,A): 2.1 CM^2
BH CV ECHO MEAS - AVA(I,D): 2.1 CM^2
BH CV ECHO MEAS - AVA(V,A): 2.1 CM^2
BH CV ECHO MEAS - AVA(V,D): 2.1 CM^2
BH CV ECHO MEAS - BSA(HAYCOCK): 2.3 M^2
BH CV ECHO MEAS - BSA: 2.2 M^2
BH CV ECHO MEAS - BZI_BMI: 32.2 KILOGRAMS/M^2
BH CV ECHO MEAS - BZI_DIASTOLIC_PRESSURE: 83 MMHG
BH CV ECHO MEAS - BZI_HEART_RATE: 81 BPM
BH CV ECHO MEAS - BZI_METRIC_HEIGHT: 177.8 CM
BH CV ECHO MEAS - BZI_METRIC_WEIGHT: 101.7 KG
BH CV ECHO MEAS - BZI_SYSTOLIC_PRESSURE: 149 MMHG
BH CV ECHO MEAS - CONTRAST EF 4CH: 46.9 ML/M^2
BH CV ECHO MEAS - EDV(CUBED): 110.6 ML
BH CV ECHO MEAS - EDV(MOD-SP4): 81 ML
BH CV ECHO MEAS - EDV(TEICH): 107.5 ML
BH CV ECHO MEAS - EF(CUBED): 45.1 %
BH CV ECHO MEAS - EF(MOD-SP4): 50 %
BH CV ECHO MEAS - EF(TEICH): 37.6 %
BH CV ECHO MEAS - ESV(CUBED): 60.7 ML
BH CV ECHO MEAS - ESV(MOD-SP4): 43 ML
BH CV ECHO MEAS - ESV(TEICH): 67.1 ML
BH CV ECHO MEAS - FS: 18.1 %
BH CV ECHO MEAS - IVS/LVPW: 1.1
BH CV ECHO MEAS - IVSD: 1.1 CM
BH CV ECHO MEAS - LA DIMENSION: 2.9 CM
BH CV ECHO MEAS - LAT PEAK E' VEL: 6 CM/SEC
BH CV ECHO MEAS - LV DIASTOLIC VOL/BSA (35-75): 37 ML/M^2
BH CV ECHO MEAS - LV MASS(C)D: 181.9 GRAMS
BH CV ECHO MEAS - LV MASS(C)DI: 83 GRAMS/M^2
BH CV ECHO MEAS - LV MAX PG: 6.9 MMHG
BH CV ECHO MEAS - LV MEAN PG: 4 MMHG
BH CV ECHO MEAS - LV SYSTOLIC VOL/BSA (12-30): 19.6 ML/M^2
BH CV ECHO MEAS - LV V1 MAX: 131 CM/SEC
BH CV ECHO MEAS - LV V1 MEAN: 99.5 CM/SEC
BH CV ECHO MEAS - LV V1 VTI: 28.4 CM
BH CV ECHO MEAS - LVIDD: 4.8 CM
BH CV ECHO MEAS - LVIDS: 3.9 CM
BH CV ECHO MEAS - LVLD AP4: 9 CM
BH CV ECHO MEAS - LVLS AP4: 8.1 CM
BH CV ECHO MEAS - LVOT AREA (M): 3.1 CM^2
BH CV ECHO MEAS - LVOT AREA: 3.1 CM^2
BH CV ECHO MEAS - LVOT DIAM: 2 CM
BH CV ECHO MEAS - LVPWD: 1 CM
BH CV ECHO MEAS - MED PEAK E' VEL: 4 CM/SEC
BH CV ECHO MEAS - MV A DUR: 0.14 SEC
BH CV ECHO MEAS - MV A MAX VEL: 119 CM/SEC
BH CV ECHO MEAS - MV DEC SLOPE: 342 CM/SEC^2
BH CV ECHO MEAS - MV DEC TIME: 0.25 SEC
BH CV ECHO MEAS - MV E MAX VEL: 76.5 CM/SEC
BH CV ECHO MEAS - MV E/A: 0.64
BH CV ECHO MEAS - MV MAX PG: 6.1 MMHG
BH CV ECHO MEAS - MV MEAN PG: 3 MMHG
BH CV ECHO MEAS - MV P1/2T MAX VEL: 83.4 CM/SEC
BH CV ECHO MEAS - MV P1/2T: 71.4 MSEC
BH CV ECHO MEAS - MV V2 MAX: 123 CM/SEC
BH CV ECHO MEAS - MV V2 MEAN: 82.5 CM/SEC
BH CV ECHO MEAS - MV V2 VTI: 28.7 CM
BH CV ECHO MEAS - MVA P1/2T LCG: 2.6 CM^2
BH CV ECHO MEAS - MVA(P1/2T): 3.1 CM^2
BH CV ECHO MEAS - MVA(VTI): 3.1 CM^2
BH CV ECHO MEAS - PULM A REVS DUR: 0.13 SEC
BH CV ECHO MEAS - PULM A REVS VEL: 36.4 CM/SEC
BH CV ECHO MEAS - PULM DIAS VEL: 52 CM/SEC
BH CV ECHO MEAS - PULM S/D: 1.1
BH CV ECHO MEAS - PULM SYS VEL: 56.7 CM/SEC
BH CV ECHO MEAS - SI(CUBED): 22.8 ML/M^2
BH CV ECHO MEAS - SI(LVOT): 40.7 ML/M^2
BH CV ECHO MEAS - SI(MOD-SP4): 17.3 ML/M^2
BH CV ECHO MEAS - SI(TEICH): 18.4 ML/M^2
BH CV ECHO MEAS - SV(CUBED): 49.9 ML
BH CV ECHO MEAS - SV(LVOT): 89.2 ML
BH CV ECHO MEAS - SV(MOD-SP4): 38 ML
BH CV ECHO MEAS - SV(TEICH): 40.4 ML
BH CV ECHO MEAS - TAPSE (>1.6): 1.2 CM2
BH CV XLRA - RV BASE: 1.8 CM
BH CV XLRA - RV LENGTH: 8.4 CM
BH CV XLRA - RV MID: 1.7 CM
BILIRUB SERPL-MCNC: 0.2 MG/DL (ref 0.1–1.2)
BUN BLD-MCNC: 13 MG/DL (ref 8–23)
BUN/CREAT SERPL: 12.5 (ref 7–25)
CALCIUM SPEC-SCNC: 9.3 MG/DL (ref 8.6–10.5)
CHLORIDE SERPL-SCNC: 100 MMOL/L (ref 98–107)
CHOLEST SERPL-MCNC: 159 MG/DL (ref 0–200)
CO2 SERPL-SCNC: 22.3 MMOL/L (ref 22–29)
CREAT BLD-MCNC: 1.04 MG/DL (ref 0.76–1.27)
E/E' RATIO: 17
GFR SERPL CREATININE-BSD FRML MDRD: 71 ML/MIN/1.73
GLOBULIN UR ELPH-MCNC: 3.4 GM/DL
GLUCOSE BLD-MCNC: 159 MG/DL (ref 65–99)
HDLC SERPL-MCNC: 40 MG/DL (ref 40–60)
LDLC SERPL CALC-MCNC: 82 MG/DL (ref 0–100)
LDLC/HDLC SERPL: 2.05 {RATIO}
LEFT ATRIUM VOLUME INDEX: 21 ML/M2
POTASSIUM BLD-SCNC: 4.2 MMOL/L (ref 3.5–5.2)
PROT SERPL-MCNC: 7.4 G/DL (ref 6–8.5)
SODIUM BLD-SCNC: 139 MMOL/L (ref 136–145)
T-UPTAKE NFR SERPL: 0.98 TBI (ref 0.8–1.3)
T4 SERPL-MCNC: 7 MCG/DL (ref 4.5–11.7)
TRIGL SERPL-MCNC: 185 MG/DL (ref 0–150)
TSH SERPL DL<=0.05 MIU/L-ACNC: 1.91 MIU/ML (ref 0.27–4.2)
VLDLC SERPL-MCNC: 37 MG/DL (ref 5–40)

## 2017-03-13 PROCEDURE — 84443 ASSAY THYROID STIM HORMONE: CPT | Performed by: INTERNAL MEDICINE

## 2017-03-13 PROCEDURE — 80053 COMPREHEN METABOLIC PANEL: CPT | Performed by: INTERNAL MEDICINE

## 2017-03-13 PROCEDURE — 36415 COLL VENOUS BLD VENIPUNCTURE: CPT | Performed by: INTERNAL MEDICINE

## 2017-03-13 PROCEDURE — 84436 ASSAY OF TOTAL THYROXINE: CPT | Performed by: INTERNAL MEDICINE

## 2017-03-13 PROCEDURE — 84479 ASSAY OF THYROID (T3 OR T4): CPT | Performed by: INTERNAL MEDICINE

## 2017-03-13 PROCEDURE — 93306 TTE W/DOPPLER COMPLETE: CPT

## 2017-03-13 PROCEDURE — 80061 LIPID PANEL: CPT | Performed by: INTERNAL MEDICINE

## 2017-03-13 PROCEDURE — 93306 TTE W/DOPPLER COMPLETE: CPT | Performed by: INTERNAL MEDICINE

## 2017-03-13 PROCEDURE — 85610 PROTHROMBIN TIME: CPT | Performed by: INTERNAL MEDICINE

## 2017-03-22 ENCOUNTER — OFFICE VISIT (OUTPATIENT)
Dept: CARDIOLOGY | Facility: CLINIC | Age: 72
End: 2017-03-22

## 2017-03-22 ENCOUNTER — HOSPITAL ENCOUNTER (OUTPATIENT)
Dept: CARDIOLOGY | Facility: HOSPITAL | Age: 72
Discharge: HOME | End: 2017-03-22
Attending: INTERNAL MEDICINE | Admitting: INTERNAL MEDICINE

## 2017-03-22 VITALS
RESPIRATION RATE: 18 BRPM | BODY MASS INDEX: 32.21 KG/M2 | DIASTOLIC BLOOD PRESSURE: 68 MMHG | OXYGEN SATURATION: 97 % | HEIGHT: 70 IN | SYSTOLIC BLOOD PRESSURE: 120 MMHG | WEIGHT: 225 LBS | HEART RATE: 73 BPM

## 2017-03-22 VITALS
WEIGHT: 225 LBS | HEIGHT: 70 IN | HEART RATE: 73 BPM | DIASTOLIC BLOOD PRESSURE: 68 MMHG | BODY MASS INDEX: 32.21 KG/M2 | SYSTOLIC BLOOD PRESSURE: 120 MMHG

## 2017-03-22 DIAGNOSIS — I71.40 ABDOMINAL AORTIC ANEURYSM (AAA) WITHOUT RUPTURE (HCC): ICD-10-CM

## 2017-03-22 DIAGNOSIS — I25.10 CHRONIC CORONARY ARTERY DISEASE: ICD-10-CM

## 2017-03-22 DIAGNOSIS — I25.10 CORONARY ARTERY DISEASE INVOLVING NATIVE CORONARY ARTERY OF NATIVE HEART WITHOUT ANGINA PECTORIS: Primary | ICD-10-CM

## 2017-03-22 DIAGNOSIS — I10 ESSENTIAL HYPERTENSION: ICD-10-CM

## 2017-03-22 DIAGNOSIS — Z95.1 S/P CABG (CORONARY ARTERY BYPASS GRAFT): ICD-10-CM

## 2017-03-22 LAB
BH CV STRESS BP STAGE 1: NORMAL
BH CV STRESS BP STAGE 2: NORMAL
BH CV STRESS BP STAGE 3: NORMAL
BH CV STRESS BP STAGE 4: NORMAL
BH CV STRESS DURATION MIN STAGE 1: 3
BH CV STRESS DURATION MIN STAGE 2: 3
BH CV STRESS DURATION MIN STAGE 3: 3
BH CV STRESS DURATION MIN STAGE 4: 1
BH CV STRESS DURATION SEC STAGE 1: 0
BH CV STRESS DURATION SEC STAGE 2: 0
BH CV STRESS DURATION SEC STAGE 3: 0
BH CV STRESS DURATION SEC STAGE 4: 1
BH CV STRESS GRADE STAGE 1: 0
BH CV STRESS GRADE STAGE 2: 5
BH CV STRESS GRADE STAGE 3: 10
BH CV STRESS GRADE STAGE 4: 12
BH CV STRESS HR STAGE 1: 88
BH CV STRESS HR STAGE 2: 97
BH CV STRESS HR STAGE 3: 108
BH CV STRESS HR STAGE 4: 114
BH CV STRESS METS STAGE 1: 2.3
BH CV STRESS METS STAGE 2: 3.4
BH CV STRESS METS STAGE 3: 4.6
BH CV STRESS METS STAGE 4: 7
BH CV STRESS O2 STAGE 4: 99
BH CV STRESS PROTOCOL 1: NORMAL
BH CV STRESS RECOVERY BP: NORMAL MMHG
BH CV STRESS RECOVERY HR: 80 BPM
BH CV STRESS RECOVERY O2: 99 %
BH CV STRESS SPEED STAGE 1: 1.7
BH CV STRESS SPEED STAGE 2: 1.7
BH CV STRESS SPEED STAGE 3: 1.7
BH CV STRESS SPEED STAGE 4: 2.5
BH CV STRESS STAGE 1: 1
BH CV STRESS STAGE 2: NORMAL
BH CV STRESS STAGE 3: 1
BH CV STRESS STAGE 4: 2
MAXIMAL PREDICTED HEART RATE: 150 BPM
PERCENT MAX PREDICTED HR: 76 %
STRESS BASELINE BP: NORMAL MMHG
STRESS BASELINE HR: 81 BPM
STRESS O2 SAT REST: 97 %
STRESS PERCENT HR: 89 %
STRESS POST ESTIMATED WORKLOAD: 7 METS
STRESS POST EXERCISE DUR MIN: 10 MIN
STRESS POST EXERCISE DUR SEC: 1 SEC
STRESS POST O2 SAT PEAK: 99 %
STRESS POST PEAK BP: NORMAL MMHG
STRESS POST PEAK HR: 114 BPM
STRESS TARGET HR: 128 BPM

## 2017-03-22 PROCEDURE — 93000 ELECTROCARDIOGRAM COMPLETE: CPT | Performed by: INTERNAL MEDICINE

## 2017-03-22 PROCEDURE — 93017 CV STRESS TEST TRACING ONLY: CPT

## 2017-03-22 PROCEDURE — 93018 CV STRESS TEST I&R ONLY: CPT | Performed by: INTERNAL MEDICINE

## 2017-03-22 PROCEDURE — 99213 OFFICE O/P EST LOW 20 MIN: CPT | Performed by: INTERNAL MEDICINE

## 2017-03-22 PROCEDURE — 93016 CV STRESS TEST SUPVJ ONLY: CPT | Performed by: INTERNAL MEDICINE

## 2017-03-22 RX ORDER — ATORVASTATIN CALCIUM 40 MG/1
40 TABLET, FILM COATED ORAL DAILY
COMMUNITY
End: 2018-02-08 | Stop reason: ALTCHOICE

## 2017-03-22 NOTE — PROGRESS NOTES
Subjective:       Conor Faustin is a 71 y.o. male who here for follow up    CC  6 months follow-up with him hypertension and coronary artery disease  HPI  71 years old white male with known history of benign essential arterial hypertension as well as coronary artery disease for the several years, recently underwent coronary artery bypass graft surgery is here for the follow-up with no complaints of chest pains or tightness in the chest, patient does have soreness post surgical     Problem List Items Addressed This Visit        Cardiovascular and Mediastinum    Essential hypertension    Coronary artery disease involving native coronary artery of native heart - Primary    Relevant Orders    ECG 12 Lead       Other    S/P CABG (coronary artery bypass graft)        Previous treatments/evaluations include: ASA and beta blocker. Cardiac risk factors: advanced age (older than 55 for men, 65 for women).    The following portions of the patient's history were reviewed and updated as appropriate: allergies, current medications, past family history, past medical history, past social history, past surgical history and problem list.    Past Medical History:   Diagnosis Date   • AAA (abdominal aortic aneurysm)    • Benign essential hypertension    • COPD (chronic obstructive pulmonary disease)    • Coronary artery disease    • Renal cyst     RIGHT   • Smoker     1 PPD    reports that he has been smoking Cigarettes.  He has been smoking about 0.00 packs per day for the past 64.00 years. He has never used smokeless tobacco. He reports that he does not drink alcohol or use illicit drugs.  Family History   Problem Relation Age of Onset   • No Known Problems Mother    • No Known Problems Father    • No Known Problems Sister    • No Known Problems Brother    • No Known Problems Maternal Aunt    • No Known Problems Maternal Uncle    • No Known Problems Paternal Aunt    • No Known Problems Paternal Uncle    • No Known Problems  "Maternal Grandmother    • No Known Problems Maternal Grandfather    • Heart disease Paternal Grandmother    • Stroke Paternal Grandmother    • No Known Problems Paternal Grandfather        Review of Systems  Constitutional: No wt loss, fever, fatigue  Gastrointestinal: No nausea, abdominal pain  Behavioral/Psych: No insomnia or anxiety   Cardiovascular no chest pains or tightness in the chest  Objective:       Physical Exam           Physical Exam  /68  Pulse 73  Ht 70\" (177.8 cm)  Wt 225 lb (102 kg)  BMI 32.28 kg/m2    General appearance: NAD, conversant   Eyes: anicteric sclerae, moist conjunctivae; no lid-lag; PERRLA   HENT: Atraumatic; oropharynx clear with moist mucous membranes and no mucosal ulcerations;  normal hard and soft palate   Neck: Trachea midline; FROM, supple, no thyromegaly or lymphadenopathy   Lungs: CTA, with normal respiratory effort and no intercostal retractions   CV: S1-S2 regular, no murmurs, no rub, no gallop   Abdomen: Soft, non-tender; no masses or HSM   Extremities: No peripheral edema or extremity lymphadenopathy  Skin: Normal temperature, turgor and texture; no rash, ulcers or subcutaneous nodules   Psych: Appropriate affect, alert and oriented to person, place and time           Cardiographics  @  ECG 12 Lead  Date/Time: 3/22/2017 10:21 AM  Performed by: JUDD BARTLETT  Authorized by: JUDD BARTLETT   Comparison: compared with previous ECG   Similar to previous ECG  Rhythm: sinus rhythm  Clinical impression: normal ECG            Echocardiogram:        Current Outpatient Prescriptions:   •  aspirin 81 MG tablet, Take 81 mg by mouth Daily., Disp: , Rfl:   •  atorvastatin (LIPITOR) 40 MG tablet, Take 40 mg by mouth Daily., Disp: , Rfl:   •  Coenzyme Q10 (COQ10) 100 MG capsule, Take 400 mg by mouth Daily., Disp: , Rfl:   •  metoprolol tartrate (LOPRESSOR) 25 MG tablet, Take 1 tablet by mouth Every 12 (Twelve) Hours., Disp: 60 tablet, Rfl: 0  •  MULTIPLE VITAMINS " ESSENTIAL PO, Take  by mouth., Disp: , Rfl:   •  nitroglycerin (NITROSTAT) 0.4 MG SL tablet, Place 0.4 mg under the tongue Every 5 (Five) Minutes As Needed for chest pain. Take no more than 3 doses in 15 minutes., Disp: , Rfl:    Assessment:        Patient Active Problem List   Diagnosis   • Aneurysm of aortic arch   • Essential hypertension   • Renal cyst   • Acute posthemorrhagic anemia   • Chronic coronary artery disease   • Abnormal result of cardiovascular function study   • Cholelithiasis   • Chronic obstructive bronchitis   • Current smoker   • AAA (abdominal aortic aneurysm)   • Angina of effort   • Coronary artery disease involving native coronary artery of native heart               Plan:       Post Cabg, instructions explained    Chest soreness upto 6 months are normal    Afib/ Palpitations upto 30% upto 3 months are common    Importance of Cardiac rehab has been explained    Importance of exercise, Cholesterol management, BP and other  risk management for long term patency has been explained          ICD-10-CM ICD-9-CM   1. Coronary artery disease involving native coronary artery of native heart without angina pectoris I25.10 414.01   2. Essential hypertension I10 401.9   3. S/P CABG (coronary artery bypass graft) Z95.1 V45.81     Blood pressure well-controlled    SEE US 6 MONTHS  COUNSELING:    Conor Zamudio was given to patient for the following topics: diagnostic results, risk factor reductions, impressions, risks and benefits of treatment options and importance of treatment compliance .       SMOKING COUNSELING:    Ready to quit: Yes  Counseling given: Yes      EMR Dragon/Transcription disclaimer:   Much of this encounter note is an electronic transcription/translation of spoken language to printed text. The electronic translation of spoken language may permit erroneous, or at times, nonsensical words or phrases to be inadvertently transcribed; Although I have reviewed the note for such  errors, some may still exist.

## 2017-03-27 PROBLEM — Z95.1 S/P CABG (CORONARY ARTERY BYPASS GRAFT): Status: ACTIVE | Noted: 2017-03-27

## 2017-09-20 ENCOUNTER — OFFICE VISIT (OUTPATIENT)
Dept: CARDIOLOGY | Facility: CLINIC | Age: 72
End: 2017-09-20

## 2017-09-20 VITALS
BODY MASS INDEX: 30.24 KG/M2 | HEIGHT: 71 IN | HEART RATE: 82 BPM | DIASTOLIC BLOOD PRESSURE: 99 MMHG | WEIGHT: 216 LBS | SYSTOLIC BLOOD PRESSURE: 179 MMHG

## 2017-09-20 DIAGNOSIS — I10 ESSENTIAL HYPERTENSION: Primary | ICD-10-CM

## 2017-09-20 DIAGNOSIS — Z72.0 TOBACCO ABUSE: ICD-10-CM

## 2017-09-20 DIAGNOSIS — Z95.1 S/P CABG (CORONARY ARTERY BYPASS GRAFT): ICD-10-CM

## 2017-09-20 DIAGNOSIS — I25.10 CHRONIC CORONARY ARTERY DISEASE: ICD-10-CM

## 2017-09-20 PROCEDURE — 99213 OFFICE O/P EST LOW 20 MIN: CPT | Performed by: INTERNAL MEDICINE

## 2017-09-20 NOTE — PROGRESS NOTES
Subjective:       Conor Faustin is a 72 y.o. male who here for follow up    CC  Follow up for htn, cad  HPI  72 yr old w/m here for follow up for benign, essential arterial HTN, cad     Conor Faustin  here for follow up with no complaints of chest pain, sob, palpitation, syncope, near syncope  No side effects with current meds  No pnd, orthopnea       Problem List Items Addressed This Visit        Cardiovascular and Mediastinum    Essential hypertension - Primary    Chronic coronary artery disease       Other    S/P CABG (coronary artery bypass graft)        .    The following portions of the patient's history were reviewed and updated as appropriate: allergies, current medications, past family history, past medical history, past social history, past surgical history and problem list.    Past Medical History:   Diagnosis Date   • AAA (abdominal aortic aneurysm)    • Benign essential hypertension    • COPD (chronic obstructive pulmonary disease)    • Coronary artery disease    • Renal cyst     RIGHT   • Smoker     1 PPD    reports that he has been smoking Cigarettes.  He has been smoking about 0.00 packs per day for the past 64.00 years. He has never used smokeless tobacco. He reports that he does not drink alcohol or use illicit drugs.  Family History   Problem Relation Age of Onset   • No Known Problems Mother    • No Known Problems Father    • No Known Problems Sister    • No Known Problems Brother    • No Known Problems Maternal Aunt    • No Known Problems Maternal Uncle    • No Known Problems Paternal Aunt    • No Known Problems Paternal Uncle    • No Known Problems Maternal Grandmother    • No Known Problems Maternal Grandfather    • Heart disease Paternal Grandmother    • Stroke Paternal Grandmother    • No Known Problems Paternal Grandfather        Review of Systems  Constitutional: No wt loss, fever, fatigue  Gastrointestinal: No nausea, abdominal pain  Behavioral/Psych: No insomnia or anxiety    "Cardiovascular no cp  Objective:       Physical Exam             Physical Exam  /99  Pulse 82  Ht 71\" (180.3 cm)  Wt 216 lb (98 kg)  BMI 30.13 kg/m2    General appearance: NAD, conversant   Eyes: anicteric sclerae, moist conjunctivae; no lid-lag; PERRLA   HENT: Atraumatic; oropharynx clear with moist mucous membranes and no mucosal ulcerations;  normal hard and soft palate   Neck: Trachea midline; FROM, supple, no thyromegaly or lymphadenopathy   Lungs: CTA, with normal respiratory effort and no intercostal retractions   CV: S1-S2 regular, no murmurs, no rub, no gallop   Abdomen: Soft, non-tender; no masses or HSM   Extremities: No peripheral edema or extremity lymphadenopathy  Skin: Normal temperature, turgor and texture; no rash, ulcers or subcutaneous nodules   Psych: Appropriate affect, alert and oriented to person, place and time           Cardiographics  @Procedures    Echocardiogram:        Current Outpatient Prescriptions:   •  aspirin 81 MG tablet, Take 81 mg by mouth Daily., Disp: , Rfl:   •  atorvastatin (LIPITOR) 40 MG tablet, Take 40 mg by mouth Daily., Disp: , Rfl:   •  Coenzyme Q10 (COQ10) 100 MG capsule, Take 400 mg by mouth Daily., Disp: , Rfl:   •  metoprolol tartrate (LOPRESSOR) 25 MG tablet, Take 1 tablet by mouth Every 12 (Twelve) Hours., Disp: 60 tablet, Rfl: 0  •  MULTIPLE VITAMINS ESSENTIAL PO, Take  by mouth., Disp: , Rfl:   •  nitroglycerin (NITROSTAT) 0.4 MG SL tablet, Place 0.4 mg under the tongue Every 5 (Five) Minutes As Needed for chest pain. Take no more than 3 doses in 15 minutes., Disp: , Rfl:    Assessment:        Patient Active Problem List   Diagnosis   • Aneurysm of aortic arch   • Essential hypertension   • Renal cyst   • Acute posthemorrhagic anemia   • Chronic coronary artery disease   • Abnormal result of cardiovascular function study   • Cholelithiasis   • Chronic obstructive bronchitis   • Current smoker   • AAA (abdominal aortic aneurysm)   • Angina of effort "   • Coronary artery disease involving native coronary artery of native heart   • S/P CABG (coronary artery bypass graft)            chol 101   Plan:            ICD-10-CM ICD-9-CM   1. Essential hypertension I10 401.9   2. Chronic coronary artery disease I25.10 414.00   3. S/P CABG (coronary artery bypass graft) Z95.1 V45.81     1. Essential hypertension  Importance of controlling hypertension and blood pressure  checkup on the regular basis has been explained  Hypertension as a silent killer has been discussed  Risk reduction of the weight and regular exercises to control the hypertension has been explained      2. Chronic coronary artery disease  Conor Faustin with coronary artery disease has no angina pectoris    Risk reduction for the coronary artery disease, controlling the blood pressure, blood sugar management, cholesterol management, exercise, stress management, and proper compliance with medications and follow-up has been discussed      3. S/P CABG (coronary artery bypass graft)     KEEP AN EYE ON BP    SEE US 1 YR  COUNSELING:    Conor RASTA Zamudio was given to patient for the following topics: diagnostic results, risk factor reductions, impressions, risks and benefits of treatment options and importance of treatment compliance .       SMOKING COUNSELING:    Ready to quit: Yes  Counseling given: Yes      EMR Dragon/Transcription disclaimer:   Much of this encounter note is an electronic transcription/translation of spoken language to printed text. The electronic translation of spoken language may permit erroneous, or at times, nonsensical words or phrases to be inadvertently transcribed; Although I have reviewed the note for such errors, some may still exist.

## 2018-01-24 ENCOUNTER — TELEPHONE (OUTPATIENT)
Dept: CARDIAC SURGERY | Facility: CLINIC | Age: 73
End: 2018-01-24

## 2018-01-24 NOTE — TELEPHONE ENCOUNTER
Mr Faustin called concerned about having a tooth pulled. I let him know due to his valve replacement he should be treated beforehand with an antibiotic. He has noticed an increase in his blood pressure and is having increased swelling. He will move up his appointment with Dr Hale to have these issues addressed

## 2018-01-25 ENCOUNTER — OFFICE VISIT (OUTPATIENT)
Dept: CARDIOLOGY | Facility: CLINIC | Age: 73
End: 2018-01-25

## 2018-01-25 VITALS
WEIGHT: 211 LBS | HEART RATE: 80 BPM | HEIGHT: 72 IN | DIASTOLIC BLOOD PRESSURE: 105 MMHG | SYSTOLIC BLOOD PRESSURE: 179 MMHG | BODY MASS INDEX: 28.58 KG/M2

## 2018-01-25 DIAGNOSIS — I10 ESSENTIAL HYPERTENSION: Primary | ICD-10-CM

## 2018-01-25 DIAGNOSIS — R06.02 SOB (SHORTNESS OF BREATH): ICD-10-CM

## 2018-01-25 DIAGNOSIS — M79.89 LEG SWELLING: ICD-10-CM

## 2018-01-25 DIAGNOSIS — I25.10 CHRONIC CORONARY ARTERY DISEASE: ICD-10-CM

## 2018-01-25 PROCEDURE — 99214 OFFICE O/P EST MOD 30 MIN: CPT | Performed by: INTERNAL MEDICINE

## 2018-01-25 RX ORDER — NITROGLYCERIN 0.4 MG/1
0.4 TABLET SUBLINGUAL
Qty: 25 TABLET | Refills: 3 | Status: SHIPPED | OUTPATIENT
Start: 2018-01-25 | End: 2018-01-25 | Stop reason: SDUPTHER

## 2018-01-25 RX ORDER — NITROGLYCERIN 0.4 MG/1
TABLET SUBLINGUAL
Qty: 275 TABLET | Refills: 3 | Status: SHIPPED | OUTPATIENT
Start: 2018-01-25 | End: 2018-09-19 | Stop reason: SDUPTHER

## 2018-01-25 RX ORDER — AMLODIPINE BESYLATE AND BENAZEPRIL HYDROCHLORIDE 5; 20 MG/1; MG/1
1 CAPSULE ORAL DAILY
Qty: 30 CAPSULE | Refills: 6 | Status: SHIPPED | OUTPATIENT
Start: 2018-01-25 | End: 2018-08-20 | Stop reason: SDUPTHER

## 2018-01-25 RX ORDER — NITROGLYCERIN 0.4 MG/1
TABLET SUBLINGUAL
Qty: 450 TABLET | Refills: 3 | Status: SHIPPED | OUTPATIENT
Start: 2018-01-25 | End: 2018-02-08 | Stop reason: ALTCHOICE

## 2018-01-25 NOTE — PROGRESS NOTES
Subjective:       Conor Faustin is a 72 y.o. male who here for follow up    CC  BILETERAL LEG SWELLING    DEPRESSED    BP VERY HIGH    Leg swelling  HPI  72-year-old male with known history of coronary artery disease coronary artery bypass graft surgery has been complaining of the bilateral leg swelling, as well as the blood pressure has been running high as well as patient has been significantly depressed, complains of the shortness of breath on exertion    Conor Faustin has been complaining of the shortness of breath mild-to-moderate in intensity with mild-to-moderate usually relieved with rest associated with anxiety and fatigue       Problem List Items Addressed This Visit        Cardiovascular and Mediastinum    Essential hypertension - Primary    Relevant Medications    amLODIPine-benazepril (LOTREL) 5-20 MG per capsule    triamterene-hydrochlorothiazide (DYAZIDE) 50-25 MG per capsule    Other Relevant Orders    Adult Transthoracic Echo Complete W/ Cont if Necessary Per Protocol    Basic Metabolic Panel    Chronic coronary artery disease    Relevant Orders    Adult Transthoracic Echo Complete W/ Cont if Necessary Per Protocol    Basic Metabolic Panel      Other Visit Diagnoses     SOB (shortness of breath)        Relevant Orders    Adult Transthoracic Echo Complete W/ Cont if Necessary Per Protocol    Basic Metabolic Panel    Leg swelling        Relevant Orders    Adult Transthoracic Echo Complete W/ Cont if Necessary Per Protocol    Basic Metabolic Panel        .    The following portions of the patient's history were reviewed and updated as appropriate: allergies, current medications, past family history, past medical history, past social history, past surgical history and problem list.    Past Medical History:   Diagnosis Date   • AAA (abdominal aortic aneurysm)    • Benign essential hypertension    • COPD (chronic obstructive pulmonary disease)    • Coronary artery disease    • Renal cyst      "RIGHT   • Smoker     1 PPD    reports that he has been smoking Cigarettes.  He has been smoking about 0.00 packs per day for the past 64.00 years. He has never used smokeless tobacco. He reports that he does not drink alcohol or use illicit drugs.  Family History   Problem Relation Age of Onset   • No Known Problems Mother    • No Known Problems Father    • No Known Problems Sister    • No Known Problems Brother    • No Known Problems Maternal Aunt    • No Known Problems Maternal Uncle    • No Known Problems Paternal Aunt    • No Known Problems Paternal Uncle    • No Known Problems Maternal Grandmother    • No Known Problems Maternal Grandfather    • Heart disease Paternal Grandmother    • Stroke Paternal Grandmother    • No Known Problems Paternal Grandfather        Review of Systems  Constitutional: No wt loss, fever, fatigue  Gastrointestinal: No nausea, abdominal pain  Behavioral/Psych: No insomnia or anxiety   Cardiovascular Shortness of breath  Objective:       Physical Exam             Physical Exam  BP (!) 179/105  Pulse 80  Ht 181.6 cm (71.5\")  Wt 95.7 kg (211 lb)  BMI 29.02 kg/m2    General appearance: NAD, conversant   Eyes: anicteric sclerae, moist conjunctivae; no lid-lag; PERRLA   HENT: Atraumatic; oropharynx clear with moist mucous membranes and no mucosal ulcerations;  normal hard and soft palate   Neck: Trachea midline; FROM, supple, no thyromegaly or lymphadenopathy   Lungs: CTA, with normal respiratory effort and no intercostal retractions   CV: S1-S2 regular, no murmurs, no rub, no gallop   Abdomen: Soft, non-tender; no masses or HSM   Extremities: No peripheral edema or extremity lymphadenopathy  Skin: Normal temperature, turgor and texture; no rash, ulcers or subcutaneous nodules   Psych: Appropriate affect, alert and oriented to person, place and time           Cardiographics  @Procedures    Echocardiogram:        Current Outpatient Prescriptions:   •  aspirin 81 MG tablet, Take 325 mg by " mouth Daily., Disp: , Rfl:   •  atorvastatin (LIPITOR) 40 MG tablet, Take 40 mg by mouth Daily., Disp: , Rfl:   •  Coenzyme Q10 (COQ10) 100 MG capsule, Take 400 mg by mouth Daily., Disp: , Rfl:   •  metoprolol tartrate (LOPRESSOR) 25 MG tablet, Take 1 tablet by mouth Every 12 (Twelve) Hours., Disp: 60 tablet, Rfl: 0  •  MULTIPLE VITAMINS ESSENTIAL PO, Take  by mouth., Disp: , Rfl:   •  nitroglycerin (NITROSTAT) 0.4 MG SL tablet, Place 0.4 mg under the tongue Every 5 (Five) Minutes As Needed for chest pain. Take no more than 3 doses in 15 minutes., Disp: , Rfl:    Assessment:        Patient Active Problem List   Diagnosis   • Aneurysm of aortic arch   • Essential hypertension   • Renal cyst   • Acute posthemorrhagic anemia   • Chronic coronary artery disease   • Abnormal result of cardiovascular function study   • Cholelithiasis   • Chronic obstructive bronchitis   • Current smoker   • AAA (abdominal aortic aneurysm)   • Angina of effort   • Coronary artery disease involving native coronary artery of native heart   • S/P CABG (coronary artery bypass graft)   • Tobacco abuse               Plan:            ICD-10-CM ICD-9-CM   1. Essential hypertension I10 401.9   2. Chronic coronary artery disease I25.10 414.00   3. SOB (shortness of breath) R06.02 786.05   4. Leg swelling M79.89 729.81     1. Essential hypertension  Considering the blood pressures are extremely high we will add Lotrel  - Adult Transthoracic Echo Complete W/ Cont if Necessary Per Protocol  - Basic Metabolic Panel    2. Chronic coronary artery disease  No chest pain  - Adult Transthoracic Echo Complete W/ Cont if Necessary Per Protocol  - Basic Metabolic Panel    3. SOB (shortness of breath)  Considering patient's medical condition as well as the risk factors, patient will require echocardiogram for further evaluation for the LV function, four-chamber evaluation, including the pressures, valvular function and  pericardial disease and pericardial  effusion    - Adult Transthoracic Echo Complete W/ Cont if Necessary Per Protocol  - Basic Metabolic Panel    4. Leg swelling  Will add the diuretics  - Adult Transthoracic Echo Complete W/ Cont if Necessary Per Protocol  - Basic Metabolic Panel     ADD LOTERL     ADD DYAZIDE    Pros and cons of this new medication / change medication has been explained to  the patient    Possible side effects has been explained    Associated need of the blood  Work has been explained    Need for the compliance of the medication has been explained      WILL NEED ECHO IN 2 WKS  BMP    COUNSELING:    Conor Zamudio was given to patient for the following topics: diagnostic results, risk factor reductions, impressions, risks and benefits of treatment options and importance of treatment compliance .       SMOKING COUNSELING:    Ready to quit: Yes  Counseling given: Yes      EMR Dragon/Transcription disclaimer:   Much of this encounter note is an electronic transcription/translation of spoken language to printed text. The electronic translation of spoken language may permit erroneous, or at times, nonsensical words or phrases to be inadvertently transcribed; Although I have reviewed the note for such errors, some may still exist.

## 2018-01-26 NOTE — TELEPHONE ENCOUNTER
Waldo - pharmacist from [facility] called.  Triamterene/HCTZ has been discontinued.    Maxzide 37.5/25 mg has been used in its place.

## 2018-02-02 ENCOUNTER — HOSPITAL ENCOUNTER (OUTPATIENT)
Dept: CARDIOLOGY | Facility: HOSPITAL | Age: 73
Discharge: HOME | End: 2018-02-02
Admitting: INTERNAL MEDICINE

## 2018-02-02 LAB
ANION GAP SERPL CALCULATED.3IONS-SCNC: 12.2 MMOL/L
BUN BLD-MCNC: 39 MG/DL (ref 8–23)
BUN/CREAT SERPL: 17.2 (ref 7–25)
CALCIUM SPEC-SCNC: 9.1 MG/DL (ref 8.6–10.5)
CHLORIDE SERPL-SCNC: 94 MMOL/L (ref 98–107)
CO2 SERPL-SCNC: 27.8 MMOL/L (ref 22–29)
CREAT BLD-MCNC: 2.27 MG/DL (ref 0.76–1.27)
GFR SERPL CREATININE-BSD FRML MDRD: 29 ML/MIN/1.73
GLUCOSE BLD-MCNC: 151 MG/DL (ref 65–99)
POTASSIUM BLD-SCNC: 5 MMOL/L (ref 3.5–5.2)
SODIUM BLD-SCNC: 134 MMOL/L (ref 136–145)

## 2018-02-02 PROCEDURE — 36415 COLL VENOUS BLD VENIPUNCTURE: CPT | Performed by: INTERNAL MEDICINE

## 2018-02-02 PROCEDURE — 80048 BASIC METABOLIC PNL TOTAL CA: CPT | Performed by: INTERNAL MEDICINE

## 2018-02-08 ENCOUNTER — OFFICE VISIT (OUTPATIENT)
Dept: CARDIOLOGY | Facility: CLINIC | Age: 73
End: 2018-02-08

## 2018-02-08 ENCOUNTER — HOSPITAL ENCOUNTER (OUTPATIENT)
Dept: CARDIOLOGY | Facility: HOSPITAL | Age: 73
Discharge: HOME | End: 2018-02-08
Attending: INTERNAL MEDICINE | Admitting: INTERNAL MEDICINE

## 2018-02-08 VITALS
BODY MASS INDEX: 27.92 KG/M2 | SYSTOLIC BLOOD PRESSURE: 151 MMHG | HEART RATE: 68 BPM | DIASTOLIC BLOOD PRESSURE: 81 MMHG | WEIGHT: 203 LBS

## 2018-02-08 VITALS — DIASTOLIC BLOOD PRESSURE: 78 MMHG | SYSTOLIC BLOOD PRESSURE: 147 MMHG

## 2018-02-08 DIAGNOSIS — I10 ESSENTIAL HYPERTENSION: Primary | ICD-10-CM

## 2018-02-08 DIAGNOSIS — Z72.0 TOBACCO ABUSE: ICD-10-CM

## 2018-02-08 DIAGNOSIS — M79.89 LEG SWELLING: ICD-10-CM

## 2018-02-08 DIAGNOSIS — I25.10 CHRONIC CORONARY ARTERY DISEASE: ICD-10-CM

## 2018-02-08 PROCEDURE — 93306 TTE W/DOPPLER COMPLETE: CPT

## 2018-02-08 PROCEDURE — 99213 OFFICE O/P EST LOW 20 MIN: CPT | Performed by: INTERNAL MEDICINE

## 2018-02-08 PROCEDURE — 25010000002 PERFLUTREN (DEFINITY) 8.476 MG IN SODIUM CHLORIDE 0.9 % 10 ML INJECTION: Performed by: INTERNAL MEDICINE

## 2018-02-08 PROCEDURE — 93000 ELECTROCARDIOGRAM COMPLETE: CPT | Performed by: INTERNAL MEDICINE

## 2018-02-08 PROCEDURE — 93306 TTE W/DOPPLER COMPLETE: CPT | Performed by: INTERNAL MEDICINE

## 2018-02-08 RX ORDER — CLONAZEPAM 1 MG/1
TABLET ORAL
Refills: 0 | COMMUNITY
Start: 2018-02-05 | End: 2019-09-18

## 2018-02-08 RX ADMIN — SODIUM CHLORIDE 3 ML: 9 INJECTION INTRAMUSCULAR; INTRAVENOUS; SUBCUTANEOUS at 10:43

## 2018-02-08 NOTE — PROGRESS NOTES
Subjective:       Conor Faustin is a 72 y.o. male who here for follow up    CC  Follow up after starting dyazide, lotrel and off lopressor  HPI  The follow-up after the change in medications for the last month  for the blood pressure as well as a leg swelling has significantly improved shortness of breath has markedly improved but patient has no side effects     Problem List Items Addressed This Visit        Cardiovascular and Mediastinum    Essential hypertension - Primary    Chronic coronary artery disease       Other    Tobacco abuse        .    The following portions of the patient's history were reviewed and updated as appropriate: allergies, current medications, past family history, past medical history, past social history, past surgical history and problem list.    Past Medical History:   Diagnosis Date   • AAA (abdominal aortic aneurysm)    • Benign essential hypertension    • COPD (chronic obstructive pulmonary disease)    • Coronary artery disease    • Renal cyst     RIGHT   • Smoker     1 PPD    reports that he has been smoking Cigarettes.  He has been smoking about 0.00 packs per day for the past 64.00 years. He has never used smokeless tobacco. He reports that he does not drink alcohol or use illicit drugs.  Family History   Problem Relation Age of Onset   • No Known Problems Mother    • No Known Problems Father    • No Known Problems Sister    • No Known Problems Brother    • No Known Problems Maternal Aunt    • No Known Problems Maternal Uncle    • No Known Problems Paternal Aunt    • No Known Problems Paternal Uncle    • No Known Problems Maternal Grandmother    • No Known Problems Maternal Grandfather    • Heart disease Paternal Grandmother    • Stroke Paternal Grandmother    • No Known Problems Paternal Grandfather        Review of Systems  Constitutional: No wt loss, fever, fatigue  Gastrointestinal: No nausea, abdominal pain  Behavioral/Psych: No insomnia or anxiety   Cardiovascular Leg  swelling and shortness of breath  Objective:       Physical Exam           Physical Exam  /81  Pulse 68  Wt 92.1 kg (203 lb)  BMI 27.92 kg/m2    General appearance: NAD, conversant   Eyes: anicteric sclerae, moist conjunctivae; no lid-lag; PERRLA   HENT: Atraumatic; oropharynx clear with moist mucous membranes and no mucosal ulcerations;  normal hard and soft palate   Neck: Trachea midline; FROM, supple, no thyromegaly or lymphadenopathy   Lungs: CTA, with normal respiratory effort and no intercostal retractions   CV: S1-S2 regular, no murmurs, no rub, no gallop   Abdomen: Soft, non-tender; no masses or HSM   Extremities: 1+ peripheral edema or extremity lymphadenopathy  Skin: Normal temperature, turgor and texture; no rash, ulcers or subcutaneous nodules   Psych: Appropriate affect, alert and oriented to person, place and time           Cardiographics  @  ECG 12 Lead  Date/Time: 2/8/2018 11:25 AM  Performed by: JUDD BARTLETT  Authorized by: JUDD BARTLETT   Comparison: compared with previous ECG   Similar to previous ECG  Rhythm: sinus rhythm  Other findings: LVH with strain  Clinical impression: abnormal ECG            Echocardiogram:        Current Outpatient Prescriptions:   •  amLODIPine-benazepril (LOTREL) 5-20 MG per capsule, Take 1 capsule by mouth Daily., Disp: 30 capsule, Rfl: 6  •  aspirin 81 MG tablet, Take 325 mg by mouth Daily., Disp: , Rfl:   •  clonazePAM (KlonoPIN) 1 MG tablet, TK 1 T PO  BID PRA, Disp: , Rfl: 0  •  Coenzyme Q10 (COQ10) 100 MG capsule, Take 400 mg by mouth Daily., Disp: , Rfl:   •  MULTIPLE VITAMINS ESSENTIAL PO, Take  by mouth., Disp: , Rfl:   •  nitroglycerin (NITROSTAT) 0.4 MG SL tablet, PLACE 1 TABLET BY MOUTH UNDER THE TONGUE EVERY 5 MINUTES AS NEEDED FOR CHEST PAIN(NO MORE THAN 3 DOSES IN 15 MINUTES), Disp: 275 tablet, Rfl: 3  •  triamterene-hydrochlorothiazide (DYAZIDE) 50-25 MG per capsule, Take 1 capsule by mouth Every Morning., Disp: 30 capsule,  Rfl: 11  No current facility-administered medications for this visit.    Assessment:        Patient Active Problem List   Diagnosis   • Aneurysm of aortic arch   • Essential hypertension   • Renal cyst   • Acute posthemorrhagic anemia   • Chronic coronary artery disease   • Abnormal result of cardiovascular function study   • Cholelithiasis   • Chronic obstructive bronchitis   • Current smoker   • AAA (abdominal aortic aneurysm)   • Angina of effort   • Coronary artery disease involving native coronary artery of native heart   • S/P CABG (coronary artery bypass graft)   • Tobacco abuse               Plan:            ICD-10-CM ICD-9-CM   1. Essential hypertension I10 401.9   2. Chronic coronary artery disease I25.10 414.00   3. Tobacco abuse Z72.0 305.1   4. Leg swelling M79.89 729.81     1. Essential hypertension  Blood pressure well-controlled with change in medications  - ECG 12 Lead    2. Chronic coronary artery disease  Angina pectoris under control shortness of breath better    3. Tobacco abuse  Counseling has been done    4. Leg swelling  Markedly improved with the change in medications  much better, see us 6 months  COUNSELING:    Conor Zamudio was given to patient for the following topics: diagnostic results, risk factor reductions, impressions, risks and benefits of treatment options and importance of treatment compliance .       SMOKING COUNSELING:    Ready to quit: Not Answered  Counseling given: Yes      EMR Dragon/Transcription disclaimer:   Much of this encounter note is an electronic transcription/translation of spoken language to printed text. The electronic translation of spoken language may permit erroneous, or at times, nonsensical words or phrases to be inadvertently transcribed; Although I have reviewed the note for such errors, some may still exist.

## 2018-02-09 LAB
BH CV ECHO MEAS - AO MAX PG (FULL): 13.9 MMHG
BH CV ECHO MEAS - AO MAX PG: 21.7 MMHG
BH CV ECHO MEAS - AO MEAN PG (FULL): 9 MMHG
BH CV ECHO MEAS - AO MEAN PG: 13 MMHG
BH CV ECHO MEAS - AO ROOT AREA (BSA CORRECTED): 1.1
BH CV ECHO MEAS - AO ROOT AREA: 4.5 CM^2
BH CV ECHO MEAS - AO ROOT DIAM: 2.4 CM
BH CV ECHO MEAS - AO V2 MAX: 233 CM/SEC
BH CV ECHO MEAS - AO V2 MEAN: 173 CM/SEC
BH CV ECHO MEAS - AO V2 VTI: 49.7 CM
BH CV ECHO MEAS - AVA(I,A): 1.8 CM^2
BH CV ECHO MEAS - AVA(I,D): 1.8 CM^2
BH CV ECHO MEAS - AVA(V,A): 1.9 CM^2
BH CV ECHO MEAS - AVA(V,D): 1.9 CM^2
BH CV ECHO MEAS - BSA(HAYCOCK): 2.2 M^2
BH CV ECHO MEAS - BSA: 2.2 M^2
BH CV ECHO MEAS - BZI_BMI: 29.4 KILOGRAMS/M^2
BH CV ECHO MEAS - BZI_METRIC_HEIGHT: 180.3 CM
BH CV ECHO MEAS - BZI_METRIC_WEIGHT: 95.7 KG
BH CV ECHO MEAS - CONTRAST EF (2CH): 51.2 ML/M^2
BH CV ECHO MEAS - CONTRAST EF 4CH: 52.5 ML/M^2
BH CV ECHO MEAS - EDV(CUBED): 157.5 ML
BH CV ECHO MEAS - EDV(MOD-SP2): 84 ML
BH CV ECHO MEAS - EDV(MOD-SP4): 80 ML
BH CV ECHO MEAS - EDV(TEICH): 141.3 ML
BH CV ECHO MEAS - EF(CUBED): 65.2 %
BH CV ECHO MEAS - EF(MOD-SP2): 51.2 %
BH CV ECHO MEAS - EF(MOD-SP4): 52.5 %
BH CV ECHO MEAS - EF(TEICH): 56.2 %
BH CV ECHO MEAS - ESV(CUBED): 54.9 ML
BH CV ECHO MEAS - ESV(MOD-SP2): 41 ML
BH CV ECHO MEAS - ESV(MOD-SP4): 38 ML
BH CV ECHO MEAS - ESV(TEICH): 62 ML
BH CV ECHO MEAS - FS: 29.6 %
BH CV ECHO MEAS - IVS/LVPW: 0.91
BH CV ECHO MEAS - IVSD: 1 CM
BH CV ECHO MEAS - LAT PEAK E' VEL: 9 CM/SEC
BH CV ECHO MEAS - LV DIASTOLIC VOL/BSA (35-75): 37.1 ML/M^2
BH CV ECHO MEAS - LV MASS(C)D: 220.6 GRAMS
BH CV ECHO MEAS - LV MASS(C)DI: 102.2 GRAMS/M^2
BH CV ECHO MEAS - LV MAX PG: 7.8 MMHG
BH CV ECHO MEAS - LV MEAN PG: 4 MMHG
BH CV ECHO MEAS - LV SYSTOLIC VOL/BSA (12-30): 17.6 ML/M^2
BH CV ECHO MEAS - LV V1 MAX: 140 CM/SEC
BH CV ECHO MEAS - LV V1 MEAN: 85.4 CM/SEC
BH CV ECHO MEAS - LV V1 VTI: 28.3 CM
BH CV ECHO MEAS - LVIDD: 5.4 CM
BH CV ECHO MEAS - LVIDS: 3.8 CM
BH CV ECHO MEAS - LVLD AP2: 8.4 CM
BH CV ECHO MEAS - LVLD AP4: 7.8 CM
BH CV ECHO MEAS - LVLS AP2: 6.9 CM
BH CV ECHO MEAS - LVLS AP4: 7.4 CM
BH CV ECHO MEAS - LVOT AREA (M): 3.1 CM^2
BH CV ECHO MEAS - LVOT AREA: 3.1 CM^2
BH CV ECHO MEAS - LVOT DIAM: 2 CM
BH CV ECHO MEAS - LVPWD: 1.1 CM
BH CV ECHO MEAS - MED PEAK E' VEL: 7 CM/SEC
BH CV ECHO MEAS - MV A DUR: 0.14 SEC
BH CV ECHO MEAS - MV A MAX VEL: 125 CM/SEC
BH CV ECHO MEAS - MV DEC SLOPE: 988 CM/SEC^2
BH CV ECHO MEAS - MV DEC TIME: 0.22 SEC
BH CV ECHO MEAS - MV E MAX VEL: 134 CM/SEC
BH CV ECHO MEAS - MV E/A: 1.1
BH CV ECHO MEAS - MV MAX PG: 9.1 MMHG
BH CV ECHO MEAS - MV MEAN PG: 4 MMHG
BH CV ECHO MEAS - MV P1/2T MAX VEL: 144 CM/SEC
BH CV ECHO MEAS - MV P1/2T: 42.7 MSEC
BH CV ECHO MEAS - MV V2 MAX: 151 CM/SEC
BH CV ECHO MEAS - MV V2 MEAN: 89.8 CM/SEC
BH CV ECHO MEAS - MV V2 VTI: 37.5 CM
BH CV ECHO MEAS - MVA P1/2T LCG: 1.5 CM^2
BH CV ECHO MEAS - MVA(P1/2T): 5.2 CM^2
BH CV ECHO MEAS - MVA(VTI): 2.4 CM^2
BH CV ECHO MEAS - RAP SYSTOLE: 3 MMHG
BH CV ECHO MEAS - SI(AO): 104.2 ML/M^2
BH CV ECHO MEAS - SI(CUBED): 47.6 ML/M^2
BH CV ECHO MEAS - SI(LVOT): 41.2 ML/M^2
BH CV ECHO MEAS - SI(MOD-SP2): 19.9 ML/M^2
BH CV ECHO MEAS - SI(MOD-SP4): 19.5 ML/M^2
BH CV ECHO MEAS - SI(TEICH): 36.8 ML/M^2
BH CV ECHO MEAS - SV(AO): 224.8 ML
BH CV ECHO MEAS - SV(CUBED): 102.6 ML
BH CV ECHO MEAS - SV(LVOT): 88.9 ML
BH CV ECHO MEAS - SV(MOD-SP2): 43 ML
BH CV ECHO MEAS - SV(MOD-SP4): 42 ML
BH CV ECHO MEAS - SV(TEICH): 79.4 ML
BH CV ECHO MEAS - TAPSE (>1.6): 2 CM2
BH CV VAS BP RIGHT ARM: NORMAL MMHG
BH CV XLRA - RV BASE: 3 CM
BH CV XLRA - TDI S': 11 CM/SEC
E/E' RATIO: 18
LEFT ATRIUM VOLUME INDEX: 28 ML/M2
MAXIMAL PREDICTED HEART RATE: 149 BPM
STRESS TARGET HR: 127 BPM

## 2018-02-10 PROBLEM — M79.89 LEG SWELLING: Status: ACTIVE | Noted: 2018-02-10

## 2018-02-21 ENCOUNTER — TELEPHONE (OUTPATIENT)
Dept: CARDIOLOGY | Facility: CLINIC | Age: 73
End: 2018-02-21

## 2018-02-21 NOTE — TELEPHONE ENCOUNTER
----- Message from Gabrielle Giles sent at 2/20/2018  2:33 PM EST -----  Regarding: STOPPING ASA PRIOR TO DENTAL PROCEDURE  [facility] [phone] [facility] [facility]   HAVING 3 TEETH REMOVED ON 3/1       Will discuss with dr rogers

## 2018-02-22 ENCOUNTER — TELEPHONE (OUTPATIENT)
Dept: CARDIOLOGY | Facility: CLINIC | Age: 73
End: 2018-02-22

## 2018-02-22 NOTE — TELEPHONE ENCOUNTER
----- Message from Jaquelin Barbosa sent at 2/21/2018 10:10 AM EST -----  PLEASE CALL [facility] AT [facility]  [phone]  OR FAX REALEASE FOR PATIENT TO BE OFF BABY ASPIRIN 10 DAYS PRIOR TO ORAL SURGERY SCHEDULED FOR MARCH 1ST  [phone]      Faxed letter ok to hold ASA 5/7 days prior to dental procedure to [phone]

## 2018-08-20 RX ORDER — AMLODIPINE BESYLATE AND BENAZEPRIL HYDROCHLORIDE 5; 20 MG/1; MG/1
1 CAPSULE ORAL DAILY
Qty: 30 CAPSULE | Refills: 0 | Status: SHIPPED | OUTPATIENT
Start: 2018-08-20 | End: 2018-09-25 | Stop reason: SDUPTHER

## 2018-09-19 ENCOUNTER — OFFICE VISIT (OUTPATIENT)
Dept: CARDIOLOGY | Facility: CLINIC | Age: 73
End: 2018-09-19

## 2018-09-19 VITALS
HEART RATE: 71 BPM | BODY MASS INDEX: 27.36 KG/M2 | WEIGHT: 202 LBS | HEIGHT: 72 IN | DIASTOLIC BLOOD PRESSURE: 76 MMHG | SYSTOLIC BLOOD PRESSURE: 149 MMHG

## 2018-09-19 DIAGNOSIS — I25.118 CORONARY ARTERY DISEASE OF NATIVE ARTERY OF NATIVE HEART WITH STABLE ANGINA PECTORIS (HCC): ICD-10-CM

## 2018-09-19 DIAGNOSIS — I71.40 ABDOMINAL AORTIC ANEURYSM (AAA) WITHOUT RUPTURE (HCC): ICD-10-CM

## 2018-09-19 DIAGNOSIS — Z72.0 TOBACCO ABUSE: ICD-10-CM

## 2018-09-19 DIAGNOSIS — I10 ESSENTIAL HYPERTENSION: Primary | ICD-10-CM

## 2018-09-19 DIAGNOSIS — Z95.1 S/P CABG (CORONARY ARTERY BYPASS GRAFT): ICD-10-CM

## 2018-09-19 PROCEDURE — 99214 OFFICE O/P EST MOD 30 MIN: CPT | Performed by: INTERNAL MEDICINE

## 2018-09-19 RX ORDER — NITROGLYCERIN 0.4 MG/1
0.4 TABLET SUBLINGUAL
Qty: 275 TABLET | Refills: 3 | Status: SHIPPED | OUTPATIENT
Start: 2018-09-19 | End: 2020-06-01

## 2018-09-19 RX ORDER — NITROGLYCERIN 0.4 MG/1
0.4 TABLET SUBLINGUAL
Qty: 275 TABLET | Refills: 3 | Status: SHIPPED | OUTPATIENT
Start: 2018-09-19 | End: 2018-09-19 | Stop reason: SDUPTHER

## 2018-09-19 RX ORDER — TRIAMTERENE AND HYDROCHLOROTHIAZIDE 37.5; 25 MG/1; MG/1
1 CAPSULE ORAL EVERY MORNING
Qty: 30 CAPSULE | Refills: 6 | Status: SHIPPED | OUTPATIENT
Start: 2018-09-19 | End: 2019-09-18

## 2018-09-19 NOTE — PROGRESS NOTES
Subjective:       Conor Faustin is a 73 y.o. male who here for follow up    CC  Mild leg swelling    Off the diuretics  HPI  73-year-old male with known history of benign essential arterial hypertension, AAA, coronary artery disease with a status post CABG has been complaining of the mild leg swelling, patient has been off the diuretics by the primary care physician, swelling is for the last several days     Problem List Items Addressed This Visit        Cardiovascular and Mediastinum    Essential hypertension - Primary    AAA (abdominal aortic aneurysm) (CMS/HCC)    Coronary artery disease involving native coronary artery of native heart       Other    S/P CABG (coronary artery bypass graft)    Tobacco abuse        .    The following portions of the patient's history were reviewed and updated as appropriate: allergies, current medications, past family history, past medical history, past social history, past surgical history and problem list.    Past Medical History:   Diagnosis Date   • AAA (abdominal aortic aneurysm) (CMS/HCC)    • Benign essential hypertension    • COPD (chronic obstructive pulmonary disease) (CMS/[facility])    • Coronary artery disease    • Renal cyst     RIGHT   • Smoker     1 PPD    reports that he has been smoking Cigarettes.  He has been smoking about 0.00 packs per day for the past 64.00 years. He has never used smokeless tobacco. He reports that he does not drink alcohol or use drugs.  Family History   Problem Relation Age of Onset   • No Known Problems Mother    • No Known Problems Father    • No Known Problems Sister    • No Known Problems Brother    • No Known Problems Maternal Aunt    • No Known Problems Maternal Uncle    • No Known Problems Paternal Aunt    • No Known Problems Paternal Uncle    • No Known Problems Maternal Grandmother    • No Known Problems Maternal Grandfather    • Heart disease Paternal Grandmother    • Stroke Paternal Grandmother    • No Known Problems Paternal  "Grandfather        Review of Systems  Constitutional: No wt loss, fever, fatigue  Gastrointestinal: No nausea, abdominal pain  Behavioral/Psych: No insomnia or anxiety   Cardiovascular leg swelling  Objective:               Physical Exam  /76 (BP Location: Left arm, Patient Position: Sitting)   Pulse 71   Ht 181.6 cm (71.5\")   Wt 91.6 kg (202 lb)   BMI 27.78 kg/m²     General appearance: NAD, conversant   Eyes: anicteric sclerae, moist conjunctivae; no lid-lag; PERRLA   HENT: Atraumatic; oropharynx clear with moist mucous membranes and no mucosal ulcerations;  normal hard and soft palate   Neck: Trachea midline; FROM, supple, no thyromegaly or lymphadenopathy   Lungs: CTA, with normal respiratory effort and no intercostal retractions   CV: S1-S2 regular, no murmurs, no rub, no gallop   Abdomen: Soft, non-tender; no masses or HSM   Extremities: No peripheral edema or extremity lymphadenopathy  Skin: Normal temperature, turgor and texture; no rash, ulcers or subcutaneous nodules   Psych: Appropriate affect, alert and oriented to person, place and time           Cardiographics  @Procedures    Echocardiogram:        Current Outpatient Prescriptions:   •  amLODIPine-benazepril (LOTREL 5-20) 5-20 MG per capsule, TAKE 1 CAPSULE BY MOUTH DAILY, Disp: 30 capsule, Rfl: 0  •  aspirin 81 MG tablet, Take 325 mg by mouth Daily., Disp: , Rfl:   •  clonazePAM (KlonoPIN) 1 MG tablet, TK 1 T PO  BID PRA, Disp: , Rfl: 0  •  Coenzyme Q10 (COQ10) 100 MG capsule, Take 400 mg by mouth Daily., Disp: , Rfl:   •  MULTIPLE VITAMINS ESSENTIAL PO, Take  by mouth., Disp: , Rfl:   •  nitroglycerin (NITROSTAT) 0.4 MG SL tablet, Place 1 tablet under the tongue Every 5 (Five) Minutes As Needed for Chest Pain. Take no more than 3 doses in 15 minutes., Disp: 275 tablet, Rfl: 3   Assessment:        Patient Active Problem List   Diagnosis   • Aneurysm of aortic arch (CMS/HCC)   • Essential hypertension   • Renal cyst   • Acute posthemorrhagic " anemia   • Chronic coronary artery disease   • Abnormal result of cardiovascular function study   • Cholelithiasis   • Chronic obstructive bronchitis (CMS/HCC)   • Current smoker   • AAA (abdominal aortic aneurysm) (CMS/HCC)   • Angina of effort (CMS/HCC)   • Coronary artery disease involving native coronary artery of native heart   • S/P CABG (coronary artery bypass graft)   • Tobacco abuse   • Leg swelling               Plan:            ICD-10-CM ICD-9-CM   1. Essential hypertension I10 401.9   2. Coronary artery disease of native artery of native heart with stable angina pectoris (CMS/HCC) I25.118 414.01     413.9   3. S/P CABG (coronary artery bypass graft) Z95.1 V45.81   4. Tobacco abuse Z72.0 305.1   5. Abdominal aortic aneurysm (AAA) without rupture (CMS/HCC) I71.4 441.4     1. Essential hypertension  Blood pressures under control    2. Coronary artery disease of native artery of native heart with stable angina pectoris (CMS/HCC)  Conor Faustin with coronary artery disease has no angina pectoris    Risk reduction for the coronary artery disease, controlling the blood pressure, blood sugar management, cholesterol management, exercise, stress management, and proper compliance with medications and follow-up has been discussed      3. S/P CABG (coronary artery bypass graft)  No angina pectoris    4. Tobacco abuse  Conor Faustin has been explained that tobacco abuse is extremely harmful to the body including to the cardiovascular, it significantly increases the risk of atherosclerosis, myocardial infarction, strokes and peripheral vascular disease  Strongly advised to stop tobacco abuse  Secondhand smoking also has been explained    [unfilled]      5. Abdominal aortic aneurysm (AAA) without rupture (CMS/HCC)     6.  Leg swelling    Add dyazide    Pros and cons of this new medication / change medication has been explained to  the patient    Possible side effects has been explained    Associated need of the  blood  Work has been explained    Need for the compliance of the medication has been explained      See us 1 yr    COUNSELING:    Conor Zamudio was given to patient for the following topics: diagnostic results, risk factor reductions, impressions, risks and benefits of treatment options and importance of treatment compliance .       SMOKING COUNSELING:    Ready to quit: Not Answered  Counseling given: Yes      EMR Dragon/Transcription disclaimer:   Much of this encounter note is an electronic transcription/translation of spoken language to printed text. The electronic translation of spoken language may permit erroneous, or at times, nonsensical words or phrases to be inadvertently transcribed; Although I have reviewed the note for such errors, some may still exist.

## 2018-09-25 RX ORDER — AMLODIPINE BESYLATE AND BENAZEPRIL HYDROCHLORIDE 5; 20 MG/1; MG/1
1 CAPSULE ORAL DAILY
Qty: 30 CAPSULE | Refills: 0 | Status: SHIPPED | OUTPATIENT
Start: 2018-09-25 | End: 2018-10-22 | Stop reason: SDUPTHER

## 2018-10-22 RX ORDER — AMLODIPINE BESYLATE AND BENAZEPRIL HYDROCHLORIDE 5; 20 MG/1; MG/1
1 CAPSULE ORAL DAILY
Qty: 90 CAPSULE | Refills: 1 | Status: SHIPPED | OUTPATIENT
Start: 2018-10-22 | End: 2018-11-02 | Stop reason: SDUPTHER

## 2018-11-02 RX ORDER — AMLODIPINE BESYLATE AND BENAZEPRIL HYDROCHLORIDE 5; 20 MG/1; MG/1
1 CAPSULE ORAL DAILY
Qty: 30 CAPSULE | Refills: 2 | Status: SHIPPED | OUTPATIENT
Start: 2018-11-02 | End: 2019-02-21 | Stop reason: SDUPTHER

## 2019-02-22 RX ORDER — AMLODIPINE BESYLATE AND BENAZEPRIL HYDROCHLORIDE 5; 20 MG/1; MG/1
1 CAPSULE ORAL DAILY
Qty: 30 CAPSULE | Refills: 0 | Status: SHIPPED | OUTPATIENT
Start: 2019-02-22 | End: 2019-03-25 | Stop reason: SDUPTHER

## 2019-03-29 RX ORDER — AMLODIPINE BESYLATE AND BENAZEPRIL HYDROCHLORIDE 5; 20 MG/1; MG/1
1 CAPSULE ORAL DAILY
Qty: 30 CAPSULE | Refills: 3 | Status: SHIPPED | OUTPATIENT
Start: 2019-03-29 | End: 2019-03-29 | Stop reason: SDUPTHER

## 2019-03-29 RX ORDER — AMLODIPINE BESYLATE AND BENAZEPRIL HYDROCHLORIDE 5; 20 MG/1; MG/1
1 CAPSULE ORAL DAILY
Qty: 30 CAPSULE | Refills: 3 | Status: SHIPPED | OUTPATIENT
Start: 2019-03-29 | End: 2019-04-24 | Stop reason: SDUPTHER

## 2019-04-24 RX ORDER — AMLODIPINE BESYLATE AND BENAZEPRIL HYDROCHLORIDE 5; 20 MG/1; MG/1
1 CAPSULE ORAL DAILY
Qty: 90 CAPSULE | Refills: 1 | Status: SHIPPED | OUTPATIENT
Start: 2019-04-24 | End: 2020-04-29

## 2019-09-18 ENCOUNTER — OFFICE VISIT (OUTPATIENT)
Dept: CARDIOLOGY | Facility: CLINIC | Age: 74
End: 2019-09-18

## 2019-09-18 VITALS
WEIGHT: 207 LBS | SYSTOLIC BLOOD PRESSURE: 152 MMHG | HEART RATE: 73 BPM | BODY MASS INDEX: 28.98 KG/M2 | DIASTOLIC BLOOD PRESSURE: 84 MMHG | HEIGHT: 71 IN

## 2019-09-18 DIAGNOSIS — I44.1 AV BLOCK, MOBITZ II: Primary | ICD-10-CM

## 2019-09-18 DIAGNOSIS — I25.118 CORONARY ARTERY DISEASE OF NATIVE ARTERY OF NATIVE HEART WITH STABLE ANGINA PECTORIS (HCC): ICD-10-CM

## 2019-09-18 DIAGNOSIS — F17.200 SMOKER: ICD-10-CM

## 2019-09-18 DIAGNOSIS — I34.0 MITRAL VALVE INSUFFICIENCY, UNSPECIFIED ETIOLOGY: ICD-10-CM

## 2019-09-18 DIAGNOSIS — R94.31 ABNORMAL EKG: ICD-10-CM

## 2019-09-18 DIAGNOSIS — I10 ESSENTIAL HYPERTENSION: ICD-10-CM

## 2019-09-18 DIAGNOSIS — I71.40 ABDOMINAL AORTIC ANEURYSM (AAA) WITHOUT RUPTURE (HCC): ICD-10-CM

## 2019-09-18 PROCEDURE — 99214 OFFICE O/P EST MOD 30 MIN: CPT | Performed by: NURSE PRACTITIONER

## 2019-09-18 PROCEDURE — 99406 BEHAV CHNG SMOKING 3-10 MIN: CPT | Performed by: NURSE PRACTITIONER

## 2019-09-18 PROCEDURE — 93000 ELECTROCARDIOGRAM COMPLETE: CPT | Performed by: NURSE PRACTITIONER

## 2019-09-18 NOTE — PROGRESS NOTES
Subjective:        Conor Faustin is a 74 y.o. male who here for follow up    Chief Complaint   Patient presents with   • Hypertension     1 YR FOLLOW UP       HPI   Conor Faustin is a 74-year-old male, who is new to this provider.  He has a history of AAA, benign essential hypertension, COPD, CAD, renal cysts, and smoker. He states he has been doing well. Denie cp, shob, and palps.    Echo on 2/2018 showed EF 52.5%, LAC size is mild to moderately dilated, LV wall segments are hypokinetic: Mid anterior, and apical anterior, apical inferior and apex hypokinetic, RBC is borderline dilated, mild LV regurgitation is present, no evidence of pericardial effusion.  Stress test 2/2017 was negative for ischemia.       Last CT of abdomen with contrast showed 3.7 infrarenal abdominal aortic aneurysm and descending thoracic aorta measures 3.6 in the transverse dimension and this tapers smoothly to the level of the top of the aortic arch, see full report below.    The following portions of the patient's history were reviewed and updated as appropriate: allergies, current medications, past family history, past medical history, past social history, past surgical history and problem list.    Past Medical History:   Diagnosis Date   • AAA (abdominal aortic aneurysm) (CMS/[facility])    • Benign essential hypertension    • COPD (chronic obstructive pulmonary disease) (CMS/[facility])    • Coronary artery disease    • Renal cyst     RIGHT   • Smoker     1 PPD         reports that he has been smoking cigarettes.  He has a 64.00 pack-year smoking history. He has never used smokeless tobacco. He reports that he does not drink alcohol or use drugs.     Family History   Problem Relation Age of Onset   • No Known Problems Mother    • No Known Problems Father    • No Known Problems Sister    • No Known Problems Brother    • No Known Problems Maternal Aunt    • No Known Problems Maternal Uncle    • No Known Problems Paternal Aunt    • No Known  Problems Paternal Uncle    • No Known Problems Maternal Grandmother    • No Known Problems Maternal Grandfather    • Heart disease Paternal Grandmother    • Stroke Paternal Grandmother    • No Known Problems Paternal Grandfather        ROS     Review of Systems  Constitutional: No wt loss, fever, fatigue  Gastrointestinal: No nausea, abdominal pain  Behavioral/Psych: No insomnia or anxiety  Cardiovascular: denies chest pain and shortness of breath.      Objective:           Physical Exam   Constitutional: He is oriented to person, place, and time. He appears well-developed and well-nourished.   HENT:   Head: Normocephalic.   Right Ear: External ear normal.   Left Ear: External ear normal.   Eyes: EOM are normal.   Neck: Normal range of motion. No JVD present.   Cardiovascular: Normal rate, regular rhythm, normal heart sounds and intact distal pulses. Exam reveals no gallop and no friction rub.   No murmur heard.  Pulmonary/Chest: Effort normal and breath sounds normal. No stridor. No respiratory distress. He has no rales.   Abdominal: Soft. Bowel sounds are normal. He exhibits no distension. There is no tenderness. There is no guarding.   Musculoskeletal: Normal range of motion. He exhibits no edema or tenderness.   Neurological: He is alert and oriented to person, place, and time. He has normal reflexes.   Skin: Skin is warm.   Psychiatric: He has a normal mood and affect. Judgment normal.   Nursing note and vitals reviewed.        ECG 12 Lead  Date/Time: 9/18/2019 9:42 AM  Performed by: Cristy Cruz APRN  Authorized by: Cristy Cruz APRN   Comparison: compared with previous ECG from 9/18/2019  Rhythm: sinus rhythm  Conduction: 1st degree AV block             Echo 2/9/2018 interpretation Summary     · Left atrial cavity size is mild-to-moderately dilated.  · The following left ventricular wall segments are hypokinetic: mid anterior, apical anterior, apical inferior and apex hypokinetic.  · Right  ventricular cavity is borderline dilated.  · Mild mitral valve regurgitation is present  · Left Ventricle: Calculated EF = 52.5%.  · There is no evidence of pericardial effusion.          Stress test 2/27/2017 was negative for ischemia    Interpretation Summary        · Arrhythmias were not significant.  · No ECG evidence of myocardial ischemia.Negative clinical evidence of myocardial ischemia. Findings consistent with a normal ECG stress test.     Asymptomatic for chest pain. ECG is negative for ischemia.   Ectopy: unifocal PVC at peak exercise   B/P is appropriate. Exercise tolerance is good.   [place] treadmill score  3  Estimates an annual cardiovascular mortality of 1  %  And of 5-year survival of 93 % . Using the Duke score there is an intermediate  probability of angiographic coronary disease   Study Result     CT CHEST AND ABDOMEN WITH IV CONTRAST     HISTORY: 71-year-old male with abdominal aortic aneurysm. Shortness of  air. Coronary artery disease.     COMPARISON: CT abdomen with IV contrast 02/12/2016.     FINDINGS  CHEST: Atherosclerotic calcifications are present involving the thoracic  aorta and great vessel origins. There are also coronary arterial  calcifications. There is no pleural or pericardial effusion. The  ascending thoracic aorta measures 3.6 cm transverse dimension. The level  of the top of the aortic arch measures 2.6 cm in transverse dimension.  The descending thoracic aorta measures 2.6 cm in transverse dimension.  There is mild mural thrombus and possible calcific plaque involving the  descending thoracic aorta. Calcified nodules present in the right upper  lobe anteriorly. There is no suspicious pulmonary nodule and there is no  infiltrate or pleural effusion.     ABDOMEN:: Irregular mural thrombus is present involving the upper  abdominal aorta. The horizontal level of the SMA origin there is  moderate narrowing. There is also mild/moderate narrowing of the  abdominal aorta just below  the level of the renal artery origins. The  infrarenal abdominal aortic aneurysm is present measuring 3.7 x 3.7 cm  and this is without change from the previous examination. Fusiform  aneurysm extends to the bifurcation with approximate 9 cm craniocaudal  extension. There is calcified plaque involving the common iliac arteries  bilaterally.     Within the upper right adrenal gland there is a area of nodular  thickening measuring approximately 3.2 x 1.4 cm favored to represent an  adenoma. The left adrenal gland, spleen, pancreas, liver appear within  normal limits and are without change. Small periportal and  retroperitoneal nodes are present without change. Kidneys exhibit normal  change and there is a 4 cm right renal pelvic cyst.     IMPRESSION:  1. 3.7 cm infrarenal abdominal aortic aneurysm without change. Extensive  atherosclerotic disease with prominent noncalcified plaque within the  upper abdominal aorta similar to the previous exam.  2. No acute abnormality in the chest. Atherosclerotic calcifications are  present at the greater vessel origins. The descending thoracic aorta  measures 3.6 cm in transverse dimension and this tapers smoothly to the  level of the top of the aortic arch.     This report was finalized on 12/14/2016 8:35 PM by Dr. Scotty Sanford MD.            Current Outpatient Medications:   •  amLODIPine-benazepril (LOTREL 5-20) 5-20 MG per capsule, Take 1 capsule by mouth Daily., Disp: 90 capsule, Rfl: 1  •  aspirin 81 MG tablet, Take 325 mg by mouth Daily., Disp: , Rfl:   •  Coenzyme Q10 (COQ10) 100 MG capsule, Take 400 mg by mouth Daily., Disp: , Rfl:   •  MULTIPLE VITAMINS ESSENTIAL PO, Take  by mouth., Disp: , Rfl:   •  clonazePAM (KlonoPIN) 1 MG tablet, TK 1 T PO  BID PRA, Disp: , Rfl: 0  •  nitroglycerin (NITROSTAT) 0.4 MG SL tablet, Place 1 tablet under the tongue Every 5 (Five) Minutes As Needed for Chest Pain. Take no more than 3 doses in 15 minutes., Disp: 275 tablet, Rfl: 3  •   triamterene-hydrochlorothiazide (DYAZIDE) 37.5-25 MG per capsule, Take 1 capsule by mouth Every Morning., Disp: 30 capsule, Rfl: 6     Assessment:        Patient Active Problem List   Diagnosis   • Aneurysm of aortic arch (CMS/HCC)   • Essential hypertension   • Renal cyst   • Acute posthemorrhagic anemia   • Chronic coronary artery disease   • Abnormal result of cardiovascular function study   • Cholelithiasis   • Chronic obstructive bronchitis (CMS/HCC)   • Current smoker   • AAA (abdominal aortic aneurysm) (CMS/HCC)   • Angina of effort (CMS/HCC)   • Coronary artery disease involving native coronary artery of native heart   • S/P CABG (coronary artery bypass graft)   • Tobacco abuse   • Leg swelling       Plan:    1.  Benign essential hypertension: Today in the office his blood pressure is 152/84.  He states at home his blood pressure is a lot lower.  He will monitor it.    Educated patient on exercising for at least 30 minutes a day for 2 to 3 days a week. Importance of controlling hypertension and blood pressure checkup on the regular basis has been explained. Hypertension as a silent killer has been discussed. Risk reduction of the weight and regular exercises to control the hypertension has been explained.    2.  AAA: We will do a CT of abdomen and chest.    3. Smoker: He states he has been trying to quit he is working with his PCP, but he currently still smokes.  Cessation given.     It has been explained that tobacco abuse is extremely harmful to the body including to the cardiovascular, it significantly increases the risk of atherosclerosis, myocardial infarction, strokes and peripheral vascular disease. Strongly advised to stop tobacco abuse. Secondhand smoking also has been explained.    4. CAD: Last lipid profile on 6/20/2019 showed , HDL 33, LDL 66, and triglycerides 123.  Controlled with diet.    Risk reduction for the coronary artery disease, controlling the blood pressure, blood sugar  management, cholesterol management, exercise, stress management, and proper compliance with medications and follow-up has been discussed    5..  Abnormal EKG:  we will do a stress and echo.      It was explained to the patient that stress testing carries 85% specificity/sensitivity, and does not rule out future cardiac event.  Risks of the procedure were explained to the patient including shortness of breath, induction of myocardial infarction, and dizziness.  Patient is agreeable to proceeding with stress testing.     6. MV regurgitation: We will do echo    No diagnosis found.    There are no diagnoses linked to this encounter.    COUNSELING:    Conor Zamudio was given to patient for the following topics: diagnostic results, risk factor reductions, impressions, risks and benefits of treatment options and importance of treatment compliance .       SMOKING COUNSELING: Patient given cessation    Addendum: spent 3 min giving smoking cessation  We will do a echo, stress test, and CT of chest and abdomen.    Sincerely,   PASTOR Cuellar  [facility]  09/18/19  9:35 AM

## 2019-09-25 ENCOUNTER — HOSPITAL ENCOUNTER (OUTPATIENT)
Dept: CT IMAGING | Facility: HOSPITAL | Age: 74
Discharge: HOME | End: 2019-09-25
Admitting: NURSE PRACTITIONER

## 2019-09-25 LAB — CREAT BLDA-MCNC: 1.6 MG/DL (ref 0.6–1.3)

## 2019-09-25 PROCEDURE — 71260 CT THORAX DX C+: CPT

## 2019-09-25 PROCEDURE — 82565 ASSAY OF CREATININE: CPT

## 2019-09-25 PROCEDURE — 74160 CT ABDOMEN W/CONTRAST: CPT

## 2019-09-25 PROCEDURE — 25010000002 IOPAMIDOL 61 % SOLUTION: Performed by: NURSE PRACTITIONER

## 2019-09-25 RX ADMIN — IOPAMIDOL 85 ML: 612 INJECTION, SOLUTION INTRAVENOUS at 15:06

## 2019-09-26 ENCOUNTER — TELEPHONE (OUTPATIENT)
Dept: CARDIOLOGY | Facility: CLINIC | Age: 74
End: 2019-09-26

## 2019-09-26 NOTE — TELEPHONE ENCOUNTER
JOSEPHINE CHAMBERS LM FOR PT TO [facility]    PT CALLED BACK,  HE IS AWARE THAT HE NEEDS TO CONTACT PCP REGARDING CREATININE LEVEL.  HE STATED THAT HE WOULD CALL PCP TODAY.

## 2019-09-26 NOTE — TELEPHONE ENCOUNTER
----- Message from PASTOR Cuellar sent at 9/26/2019 10:30 AM EDT -----  Please have him follow up with his PCP regarding his creatinine.     Thanks Phillip

## 2019-09-27 ENCOUNTER — TELEPHONE (OUTPATIENT)
Dept: CARDIOLOGY | Facility: CLINIC | Age: 74
End: 2019-09-27

## 2019-09-27 NOTE — TELEPHONE ENCOUNTER
Per Phillip BAUMANN pt to follow up sooner with Dr Hale for CT results.       Pt has cardiolite and echo on 10/23/19 and follow up with Dr PLATA on 10/31.    Will discuss with Dr PLATA if pt needs to be seen sooner for AAA, if he should still do testing or if needs to see surgeon first.         Per Dr Hale pt to do  testing and follow up with him first.

## 2019-11-14 ENCOUNTER — HOSPITAL ENCOUNTER (OUTPATIENT)
Dept: CARDIOLOGY | Facility: HOSPITAL | Age: 74
Discharge: HOME | End: 2019-11-14

## 2019-11-14 ENCOUNTER — HOSPITAL ENCOUNTER (OUTPATIENT)
Dept: CARDIOLOGY | Facility: HOSPITAL | Age: 74
Discharge: HOME | End: 2019-11-14
Admitting: NURSE PRACTITIONER

## 2019-11-14 VITALS
BODY MASS INDEX: 28.98 KG/M2 | HEIGHT: 71 IN | DIASTOLIC BLOOD PRESSURE: 72 MMHG | HEART RATE: 76 BPM | WEIGHT: 207 LBS | SYSTOLIC BLOOD PRESSURE: 152 MMHG

## 2019-11-14 VITALS
WEIGHT: 207 LBS | OXYGEN SATURATION: 97 % | HEART RATE: 71 BPM | SYSTOLIC BLOOD PRESSURE: 174 MMHG | HEIGHT: 71 IN | DIASTOLIC BLOOD PRESSURE: 78 MMHG | BODY MASS INDEX: 28.98 KG/M2

## 2019-11-14 LAB
BH CV STRESS BP STAGE 1: NORMAL
BH CV STRESS COMMENTS STAGE 1: NORMAL
BH CV STRESS DOSE REGADENOSON STAGE 1: 0.4
BH CV STRESS DURATION MIN STAGE 1: 2
BH CV STRESS DURATION SEC STAGE 1: 31
BH CV STRESS GRADE STAGE 1: 0
BH CV STRESS HR STAGE 1: 102
BH CV STRESS METS STAGE 1: 1.6
BH CV STRESS PROTOCOL 1: NORMAL
BH CV STRESS RECOVERY BP: NORMAL MMHG
BH CV STRESS RECOVERY HR: 102 BPM
BH CV STRESS RECOVERY O2: 98 %
BH CV STRESS SPEED STAGE 1: 0.9
BH CV STRESS STAGE 1: 1
LV EF NUC BP: 45 %
MAXIMAL PREDICTED HEART RATE: 147 BPM
PERCENT MAX PREDICTED HR: 69.39 %
STRESS BASELINE BP: NORMAL MMHG
STRESS BASELINE HR: 75 BPM
STRESS O2 SAT REST: 97 %
STRESS PERCENT HR: 82 %
STRESS POST ESTIMATED WORKLOAD: 1.6 METS
STRESS POST EXERCISE DUR MIN: 2 MIN
STRESS POST EXERCISE DUR SEC: 31 SEC
STRESS POST PEAK BP: NORMAL MMHG
STRESS POST PEAK HR: 102 BPM
STRESS TARGET HR: 125 BPM

## 2019-11-14 PROCEDURE — 25010000002 REGADENOSON 0.4 MG/5ML SOLUTION: Performed by: INTERNAL MEDICINE

## 2019-11-14 PROCEDURE — 0 TECHNETIUM SESTAMIBI: Performed by: INTERNAL MEDICINE

## 2019-11-14 PROCEDURE — A9500 TC99M SESTAMIBI: HCPCS | Performed by: INTERNAL MEDICINE

## 2019-11-14 PROCEDURE — 78452 HT MUSCLE IMAGE SPECT MULT: CPT

## 2019-11-14 PROCEDURE — 93017 CV STRESS TEST TRACING ONLY: CPT

## 2019-11-14 PROCEDURE — 78452 HT MUSCLE IMAGE SPECT MULT: CPT | Performed by: INTERNAL MEDICINE

## 2019-11-14 PROCEDURE — 93306 TTE W/DOPPLER COMPLETE: CPT | Performed by: INTERNAL MEDICINE

## 2019-11-14 PROCEDURE — 25010000002 PERFLUTREN (DEFINITY) 8.476 MG IN SODIUM CHLORIDE (PF) 0.9 % 10 ML INJECTION: Performed by: NURSE PRACTITIONER

## 2019-11-14 PROCEDURE — 93306 TTE W/DOPPLER COMPLETE: CPT

## 2019-11-14 PROCEDURE — 93016 CV STRESS TEST SUPVJ ONLY: CPT | Performed by: INTERNAL MEDICINE

## 2019-11-14 PROCEDURE — 93018 CV STRESS TEST I&R ONLY: CPT | Performed by: INTERNAL MEDICINE

## 2019-11-14 RX ADMIN — REGADENOSON 0.4 MG: 0.08 INJECTION, SOLUTION INTRAVENOUS at 10:25

## 2019-11-14 RX ADMIN — SODIUM CHLORIDE 2 ML: 9 INJECTION INTRAMUSCULAR; INTRAVENOUS; SUBCUTANEOUS at 08:29

## 2019-11-14 RX ADMIN — TECHNETIUM TC 99M SESTAMIBI 1 DOSE: 1 INJECTION INTRAVENOUS at 10:25

## 2019-11-14 RX ADMIN — TECHNETIUM TC 99M SESTAMIBI 1 DOSE: 1 INJECTION INTRAVENOUS at 07:47

## 2019-11-15 LAB
BH CV ECHO MEAS - AO MAX PG (FULL): 27.8 MMHG
BH CV ECHO MEAS - AO MAX PG: 31.4 MMHG
BH CV ECHO MEAS - AO MEAN PG (FULL): 17 MMHG
BH CV ECHO MEAS - AO MEAN PG: 19 MMHG
BH CV ECHO MEAS - AO ROOT AREA (BSA CORRECTED): 1.5
BH CV ECHO MEAS - AO ROOT AREA: 8.6 CM^2
BH CV ECHO MEAS - AO ROOT DIAM: 3.3 CM
BH CV ECHO MEAS - AO V2 MAX: 280 CM/SEC
BH CV ECHO MEAS - AO V2 MEAN: 205 CM/SEC
BH CV ECHO MEAS - AO V2 VTI: 65.5 CM
BH CV ECHO MEAS - AVA(I,A): 1.5 CM^2
BH CV ECHO MEAS - AVA(I,D): 1.5 CM^2
BH CV ECHO MEAS - AVA(V,A): 1.5 CM^2
BH CV ECHO MEAS - AVA(V,D): 1.5 CM^2
BH CV ECHO MEAS - BSA(HAYCOCK): 2.2 M^2
BH CV ECHO MEAS - BSA: 2.1 M^2
BH CV ECHO MEAS - BZI_BMI: 28.9 KILOGRAMS/M^2
BH CV ECHO MEAS - BZI_METRIC_HEIGHT: 180.3 CM
BH CV ECHO MEAS - BZI_METRIC_WEIGHT: 93.9 KG
BH CV ECHO MEAS - EDV(CUBED): 125 ML
BH CV ECHO MEAS - EDV(MOD-SP2): 162 ML
BH CV ECHO MEAS - EDV(MOD-SP4): 193 ML
BH CV ECHO MEAS - EDV(TEICH): 118.2 ML
BH CV ECHO MEAS - EF(CUBED): 56.1 %
BH CV ECHO MEAS - EF(MOD-BP): 69 %
BH CV ECHO MEAS - EF(MOD-SP2): 64.8 %
BH CV ECHO MEAS - EF(MOD-SP4): 73.6 %
BH CV ECHO MEAS - EF(TEICH): 47.6 %
BH CV ECHO MEAS - ESV(CUBED): 54.9 ML
BH CV ECHO MEAS - ESV(MOD-SP2): 57 ML
BH CV ECHO MEAS - ESV(MOD-SP4): 51 ML
BH CV ECHO MEAS - ESV(TEICH): 62 ML
BH CV ECHO MEAS - FS: 24 %
BH CV ECHO MEAS - IVS/LVPW: 1
BH CV ECHO MEAS - IVSD: 1.4 CM
BH CV ECHO MEAS - LA DIMENSION: 3.8 CM
BH CV ECHO MEAS - LA/AO: 1.2
BH CV ECHO MEAS - LAT PEAK E' VEL: 8.7 CM/SEC
BH CV ECHO MEAS - LV DIASTOLIC VOL/BSA (35-75): 90.2 ML/M^2
BH CV ECHO MEAS - LV MASS(C)D: 291.4 GRAMS
BH CV ECHO MEAS - LV MASS(C)DI: 136.2 GRAMS/M^2
BH CV ECHO MEAS - LV MAX PG: 3.6 MMHG
BH CV ECHO MEAS - LV MEAN PG: 2 MMHG
BH CV ECHO MEAS - LV SYSTOLIC VOL/BSA (12-30): 23.8 ML/M^2
BH CV ECHO MEAS - LV V1 MAX: 95 CM/SEC
BH CV ECHO MEAS - LV V1 MEAN: 64.3 CM/SEC
BH CV ECHO MEAS - LV V1 VTI: 22.4 CM
BH CV ECHO MEAS - LVIDD: 5 CM
BH CV ECHO MEAS - LVIDS: 3.8 CM
BH CV ECHO MEAS - LVLD AP2: 9.6 CM
BH CV ECHO MEAS - LVLD AP4: 8.8 CM
BH CV ECHO MEAS - LVLS AP2: 8.5 CM
BH CV ECHO MEAS - LVLS AP4: 7.3 CM
BH CV ECHO MEAS - LVOT AREA (M): 4.5 CM^2
BH CV ECHO MEAS - LVOT AREA: 4.5 CM^2
BH CV ECHO MEAS - LVOT DIAM: 2.4 CM
BH CV ECHO MEAS - LVPWD: 1.4 CM
BH CV ECHO MEAS - MED PEAK E' VEL: 8.3 CM/SEC
BH CV ECHO MEAS - MV A DUR: 0.2 SEC
BH CV ECHO MEAS - MV A MAX VEL: 145 CM/SEC
BH CV ECHO MEAS - MV DEC SLOPE: 602 CM/SEC^2
BH CV ECHO MEAS - MV DEC TIME: 0.21 SEC
BH CV ECHO MEAS - MV E MAX VEL: 137 CM/SEC
BH CV ECHO MEAS - MV E/A: 0.94
BH CV ECHO MEAS - MV MAX PG: 8.9 MMHG
BH CV ECHO MEAS - MV MEAN PG: 4 MMHG
BH CV ECHO MEAS - MV P1/2T MAX VEL: 129 CM/SEC
BH CV ECHO MEAS - MV P1/2T: 62.8 MSEC
BH CV ECHO MEAS - MV V2 MAX: 149 CM/SEC
BH CV ECHO MEAS - MV V2 MEAN: 93.9 CM/SEC
BH CV ECHO MEAS - MV V2 VTI: 37.4 CM
BH CV ECHO MEAS - MVA P1/2T LCG: 1.7 CM^2
BH CV ECHO MEAS - MVA(P1/2T): 3.5 CM^2
BH CV ECHO MEAS - MVA(VTI): 2.7 CM^2
BH CV ECHO MEAS - PA ACC TIME: 0.12 SEC
BH CV ECHO MEAS - PA MAX PG (FULL): 5.9 MMHG
BH CV ECHO MEAS - PA MAX PG: 8.2 MMHG
BH CV ECHO MEAS - PA PR(ACCEL): 26.8 MMHG
BH CV ECHO MEAS - PA V2 MAX: 143 CM/SEC
BH CV ECHO MEAS - PULM A REVS DUR: 0.12 SEC
BH CV ECHO MEAS - PULM A REVS VEL: 25.7 CM/SEC
BH CV ECHO MEAS - PULM DIAS VEL: 51.4 CM/SEC
BH CV ECHO MEAS - PULM S/D: 1
BH CV ECHO MEAS - PULM SYS VEL: 52.4 CM/SEC
BH CV ECHO MEAS - PVA(V,A): 1.5 CM^2
BH CV ECHO MEAS - PVA(V,D): 1.5 CM^2
BH CV ECHO MEAS - QP/QS: 0.46
BH CV ECHO MEAS - RAP SYSTOLE: 3 MMHG
BH CV ECHO MEAS - RV MAX PG: 2.3 MMHG
BH CV ECHO MEAS - RV MEAN PG: 1 MMHG
BH CV ECHO MEAS - RV V1 MAX: 75.4 CM/SEC
BH CV ECHO MEAS - RV V1 MEAN: 58.7 CM/SEC
BH CV ECHO MEAS - RV V1 VTI: 16.4 CM
BH CV ECHO MEAS - RVOT AREA: 2.8 CM^2
BH CV ECHO MEAS - RVOT DIAM: 1.9 CM
BH CV ECHO MEAS - RVSP: 18.1 MMHG
BH CV ECHO MEAS - SI(AO): 261.8 ML/M^2
BH CV ECHO MEAS - SI(CUBED): 32.8 ML/M^2
BH CV ECHO MEAS - SI(LVOT): 47.4 ML/M^2
BH CV ECHO MEAS - SI(MOD-SP2): 49.1 ML/M^2
BH CV ECHO MEAS - SI(MOD-SP4): 66.4 ML/M^2
BH CV ECHO MEAS - SI(TEICH): 26.3 ML/M^2
BH CV ECHO MEAS - SV(AO): 560.2 ML
BH CV ECHO MEAS - SV(CUBED): 70.1 ML
BH CV ECHO MEAS - SV(LVOT): 101.3 ML
BH CV ECHO MEAS - SV(MOD-SP2): 105 ML
BH CV ECHO MEAS - SV(MOD-SP4): 142 ML
BH CV ECHO MEAS - SV(RVOT): 46.5 ML
BH CV ECHO MEAS - SV(TEICH): 56.3 ML
BH CV ECHO MEAS - TAPSE (>1.6): 1.9 CM
BH CV ECHO MEAS - TR MAX VEL: 194 CM/SEC
BH CV ECHO MEASUREMENTS AVERAGE E/E' RATIO: 16.12
BH CV XLRA - RV BASE: 3 CM
BH CV XLRA - RV LENGTH: 7 CM
BH CV XLRA - RV MID: 3 CM
BH CV XLRA - TDI S': 13.2 CM/SEC
LEFT ATRIUM VOLUME INDEX: 30 ML/M2
MAXIMAL PREDICTED HEART RATE: 147 BPM
STRESS TARGET HR: 125 BPM

## 2019-11-25 ENCOUNTER — OFFICE VISIT (OUTPATIENT)
Dept: CARDIOLOGY | Facility: CLINIC | Age: 74
End: 2019-11-25

## 2019-11-25 VITALS
SYSTOLIC BLOOD PRESSURE: 162 MMHG | WEIGHT: 204 LBS | BODY MASS INDEX: 27.63 KG/M2 | DIASTOLIC BLOOD PRESSURE: 83 MMHG | HEART RATE: 68 BPM | HEIGHT: 72 IN

## 2019-11-25 DIAGNOSIS — I10 ESSENTIAL HYPERTENSION: ICD-10-CM

## 2019-11-25 DIAGNOSIS — Z95.1 S/P CABG (CORONARY ARTERY BYPASS GRAFT): ICD-10-CM

## 2019-11-25 DIAGNOSIS — I25.118 CORONARY ARTERY DISEASE OF NATIVE ARTERY OF NATIVE HEART WITH STABLE ANGINA PECTORIS (HCC): Primary | ICD-10-CM

## 2019-11-25 DIAGNOSIS — Z72.0 TOBACCO ABUSE: ICD-10-CM

## 2019-11-25 PROCEDURE — 99213 OFFICE O/P EST LOW 20 MIN: CPT | Performed by: INTERNAL MEDICINE

## 2020-04-29 RX ORDER — AMLODIPINE BESYLATE AND BENAZEPRIL HYDROCHLORIDE 5; 20 MG/1; MG/1
1 CAPSULE ORAL DAILY
Qty: 30 CAPSULE | Refills: 5 | Status: SHIPPED | OUTPATIENT
Start: 2020-04-29 | End: 2020-11-16

## 2020-06-01 RX ORDER — NITROGLYCERIN 0.4 MG/1
TABLET SUBLINGUAL
Qty: 25 TABLET | Refills: 3 | Status: SHIPPED | OUTPATIENT
Start: 2020-06-01 | End: 2022-03-08 | Stop reason: SDUPTHER

## 2020-11-16 RX ORDER — AMLODIPINE BESYLATE AND BENAZEPRIL HYDROCHLORIDE 5; 20 MG/1; MG/1
1 CAPSULE ORAL DAILY
Qty: 30 CAPSULE | Refills: 0 | Status: SHIPPED | OUTPATIENT
Start: 2020-11-16 | End: 2020-11-30 | Stop reason: SDUPTHER

## 2020-11-25 RX ORDER — BLOOD-GLUCOSE METER
EACH MISCELLANEOUS
COMMUNITY
Start: 2020-09-04

## 2020-11-25 RX ORDER — LANCETS
EACH MISCELLANEOUS
COMMUNITY
Start: 2020-09-04 | End: 2022-09-01 | Stop reason: SDUPTHER

## 2020-11-25 RX ORDER — BLOOD SUGAR DIAGNOSTIC
STRIP MISCELLANEOUS
COMMUNITY
Start: 2020-09-04 | End: 2022-09-07 | Stop reason: SDUPTHER

## 2020-11-30 ENCOUNTER — OFFICE VISIT (OUTPATIENT)
Dept: CARDIOLOGY | Facility: CLINIC | Age: 75
End: 2020-11-30

## 2020-11-30 VITALS
HEIGHT: 71 IN | DIASTOLIC BLOOD PRESSURE: 83 MMHG | HEART RATE: 71 BPM | SYSTOLIC BLOOD PRESSURE: 179 MMHG | BODY MASS INDEX: 29.68 KG/M2 | WEIGHT: 212 LBS

## 2020-11-30 DIAGNOSIS — I10 ESSENTIAL HYPERTENSION: ICD-10-CM

## 2020-11-30 DIAGNOSIS — I25.118 CORONARY ARTERY DISEASE OF NATIVE ARTERY OF NATIVE HEART WITH STABLE ANGINA PECTORIS (HCC): Primary | ICD-10-CM

## 2020-11-30 DIAGNOSIS — Z72.0 TOBACCO ABUSE: ICD-10-CM

## 2020-11-30 DIAGNOSIS — Z95.1 S/P CABG (CORONARY ARTERY BYPASS GRAFT): ICD-10-CM

## 2020-11-30 DIAGNOSIS — R94.31 ABNORMAL EKG: ICD-10-CM

## 2020-11-30 PROCEDURE — 93000 ELECTROCARDIOGRAM COMPLETE: CPT | Performed by: INTERNAL MEDICINE

## 2020-11-30 PROCEDURE — 99213 OFFICE O/P EST LOW 20 MIN: CPT | Performed by: INTERNAL MEDICINE

## 2020-11-30 RX ORDER — AMLODIPINE BESYLATE AND BENAZEPRIL HYDROCHLORIDE 5; 20 MG/1; MG/1
1 CAPSULE ORAL DAILY
Qty: 90 CAPSULE | Refills: 3 | Status: SHIPPED | OUTPATIENT
Start: 2020-11-30 | End: 2021-12-08

## 2021-10-06 ENCOUNTER — TELEPHONE (OUTPATIENT)
Dept: CARDIOLOGY | Facility: CLINIC | Age: 76
End: 2021-10-06

## 2021-10-06 NOTE — TELEPHONE ENCOUNTER
FEET IS SWELLING WANTS TO KNOW IF HE CAN GET A SCRIPT CARMEL RUIZ HE HAD TAKEN IT ONCE BEFORE AND IT HELPED PLEASE ADVISE [phone] WIFE JOCELYNE     [phone]

## 2021-10-08 RX ORDER — TRIAMTERENE AND HYDROCHLOROTHIAZIDE 37.5; 25 MG/1; MG/1
1 CAPSULE ORAL EVERY MORNING
Qty: 90 CAPSULE | Refills: 1 | Status: SHIPPED | OUTPATIENT
Start: 2021-10-08 | End: 2022-03-31

## 2021-10-08 RX ORDER — TRIAMTERENE AND HYDROCHLOROTHIAZIDE 37.5; 25 MG/1; MG/1
1 CAPSULE ORAL EVERY MORNING
Qty: 30 CAPSULE | Refills: 1 | Status: SHIPPED | OUTPATIENT
Start: 2021-10-08 | End: 2021-10-08

## 2021-11-29 ENCOUNTER — APPOINTMENT (OUTPATIENT)
Dept: CARDIOLOGY | Facility: HOSPITAL | Age: 76
End: 2021-11-29

## 2021-12-08 RX ORDER — AMLODIPINE BESYLATE AND BENAZEPRIL HYDROCHLORIDE 5; 20 MG/1; MG/1
1 CAPSULE ORAL DAILY
Qty: 90 CAPSULE | Refills: 1 | Status: SHIPPED | OUTPATIENT
Start: 2021-12-08 | End: 2022-03-31

## 2021-12-23 ENCOUNTER — HOSPITAL ENCOUNTER (OUTPATIENT)
Dept: CARDIOLOGY | Facility: HOSPITAL | Age: 76
Discharge: HOME | End: 2021-12-23
Admitting: INTERNAL MEDICINE

## 2021-12-23 ENCOUNTER — OFFICE VISIT (OUTPATIENT)
Dept: CARDIOLOGY | Facility: CLINIC | Age: 76
End: 2021-12-23

## 2021-12-23 VITALS
HEART RATE: 73 BPM | BODY MASS INDEX: 30.1 KG/M2 | DIASTOLIC BLOOD PRESSURE: 93 MMHG | HEIGHT: 71 IN | WEIGHT: 215 LBS | SYSTOLIC BLOOD PRESSURE: 171 MMHG

## 2021-12-23 VITALS
SYSTOLIC BLOOD PRESSURE: 169 MMHG | HEIGHT: 71 IN | HEART RATE: 86 BPM | DIASTOLIC BLOOD PRESSURE: 89 MMHG | WEIGHT: 212 LBS | BODY MASS INDEX: 29.68 KG/M2

## 2021-12-23 DIAGNOSIS — I25.10 CHRONIC CORONARY ARTERY DISEASE: ICD-10-CM

## 2021-12-23 DIAGNOSIS — Z95.1 S/P CABG (CORONARY ARTERY BYPASS GRAFT): ICD-10-CM

## 2021-12-23 DIAGNOSIS — I10 ESSENTIAL HYPERTENSION: ICD-10-CM

## 2021-12-23 DIAGNOSIS — I25.118 CORONARY ARTERY DISEASE OF NATIVE ARTERY OF NATIVE HEART WITH STABLE ANGINA PECTORIS (HCC): ICD-10-CM

## 2021-12-23 DIAGNOSIS — Z72.0 TOBACCO ABUSE: ICD-10-CM

## 2021-12-23 DIAGNOSIS — R94.31 ABNORMAL EKG: ICD-10-CM

## 2021-12-23 DIAGNOSIS — I10 ESSENTIAL HYPERTENSION: Primary | ICD-10-CM

## 2021-12-23 LAB
BH CV ECHO MEAS - AO MAX PG (FULL): 1.7 MMHG
BH CV ECHO MEAS - AO MAX PG: 5.2 MMHG
BH CV ECHO MEAS - AO MEAN PG (FULL): 0.75 MMHG
BH CV ECHO MEAS - AO MEAN PG: 3.2 MMHG
BH CV ECHO MEAS - AO V2 MAX: 114.5 CM/SEC
BH CV ECHO MEAS - AO V2 MEAN: 85.7 CM/SEC
BH CV ECHO MEAS - AO V2 VTI: 23.8 CM
BH CV ECHO MEAS - ASC AORTA: 2.3 CM
BH CV ECHO MEAS - AVA(I,A): 3 CM^2
BH CV ECHO MEAS - AVA(I,D): 3 CM^2
BH CV ECHO MEAS - AVA(V,A): 2.6 CM^2
BH CV ECHO MEAS - AVA(V,D): 2.6 CM^2
BH CV ECHO MEAS - BSA(HAYCOCK): 2.2 M^2
BH CV ECHO MEAS - BSA: 2.2 M^2
BH CV ECHO MEAS - BZI_BMI: 29.6 KILOGRAMS/M^2
BH CV ECHO MEAS - BZI_METRIC_HEIGHT: 180.3 CM
BH CV ECHO MEAS - BZI_METRIC_WEIGHT: 96.2 KG
BH CV ECHO MEAS - EDV(CUBED): 136.9 ML
BH CV ECHO MEAS - EDV(MOD-SP4): 29 ML
BH CV ECHO MEAS - EDV(TEICH): 126.8 ML
BH CV ECHO MEAS - EF(CUBED): 63.9 %
BH CV ECHO MEAS - EF(MOD-BP): 50 %
BH CV ECHO MEAS - EF(MOD-SP4): 55.2 %
BH CV ECHO MEAS - EF(TEICH): 55 %
BH CV ECHO MEAS - ESV(CUBED): 49.4 ML
BH CV ECHO MEAS - ESV(MOD-SP4): 13 ML
BH CV ECHO MEAS - ESV(TEICH): 57 ML
BH CV ECHO MEAS - FS: 28.8 %
BH CV ECHO MEAS - IVS/LVPW: 1.2
BH CV ECHO MEAS - IVSD: 1.2 CM
BH CV ECHO MEAS - LV DIASTOLIC VOL/BSA (35-75): 13.4 ML/M^2
BH CV ECHO MEAS - LV MASS(C)D: 221.8 GRAMS
BH CV ECHO MEAS - LV MASS(C)DI: 102.6 GRAMS/M^2
BH CV ECHO MEAS - LV MAX PG: 3.6 MMHG
BH CV ECHO MEAS - LV MEAN PG: 2.5 MMHG
BH CV ECHO MEAS - LV SYSTOLIC VOL/BSA (12-30): 6 ML/M^2
BH CV ECHO MEAS - LV V1 MAX: 94.3 CM/SEC
BH CV ECHO MEAS - LV V1 MEAN: 75.9 CM/SEC
BH CV ECHO MEAS - LV V1 VTI: 22.9 CM
BH CV ECHO MEAS - LVIDD: 5.2 CM
BH CV ECHO MEAS - LVIDS: 3.7 CM
BH CV ECHO MEAS - LVLD AP4: 7.3 CM
BH CV ECHO MEAS - LVLS AP4: 6.9 CM
BH CV ECHO MEAS - LVOT AREA (M): 3.1 CM^2
BH CV ECHO MEAS - LVOT AREA: 3.2 CM^2
BH CV ECHO MEAS - LVOT DIAM: 2 CM
BH CV ECHO MEAS - LVPWD: 1 CM
BH CV ECHO MEAS - MV A MAX VEL: 172.6 CM/SEC
BH CV ECHO MEAS - MV DEC SLOPE: 907.5 CM/SEC^2
BH CV ECHO MEAS - MV DEC TIME: 193 SEC
BH CV ECHO MEAS - MV E MAX VEL: 115.1 CM/SEC
BH CV ECHO MEAS - MV E/A: 0.67
BH CV ECHO MEAS - MV MAX PG: 8 MMHG
BH CV ECHO MEAS - MV MEAN PG: 4.2 MMHG
BH CV ECHO MEAS - MV P1/2T MAX VEL: 145.5 CM/SEC
BH CV ECHO MEAS - MV P1/2T: 47 MSEC
BH CV ECHO MEAS - MV V2 MAX: 141.2 CM/SEC
BH CV ECHO MEAS - MV V2 MEAN: 95.4 CM/SEC
BH CV ECHO MEAS - MV V2 VTI: 29.5 CM
BH CV ECHO MEAS - MVA P1/2T LCG: 1.5 CM^2
BH CV ECHO MEAS - MVA(P1/2T): 4.7 CM^2
BH CV ECHO MEAS - MVA(VTI): 2.4 CM^2
BH CV ECHO MEAS - PA ACC TIME: 0.13 SEC
BH CV ECHO MEAS - PA MAX PG (FULL): 6.2 MMHG
BH CV ECHO MEAS - PA MAX PG: 12.5 MMHG
BH CV ECHO MEAS - PA PR(ACCEL): 20.4 MMHG
BH CV ECHO MEAS - PA V2 MAX: 176.5 CM/SEC
BH CV ECHO MEAS - RV MAX PG: 6.2 MMHG
BH CV ECHO MEAS - RV MEAN PG: 3.5 MMHG
BH CV ECHO MEAS - RV V1 MAX: 124.7 CM/SEC
BH CV ECHO MEAS - RV V1 MEAN: 84.2 CM/SEC
BH CV ECHO MEAS - RV V1 VTI: 26.7 CM
BH CV ECHO MEAS - RVDD: 2.3 CM
BH CV ECHO MEAS - SI(CUBED): 40.4 ML/M^2
BH CV ECHO MEAS - SI(LVOT): 33.3 ML/M^2
BH CV ECHO MEAS - SI(MOD-SP4): 7.4 ML/M^2
BH CV ECHO MEAS - SI(TEICH): 32.3 ML/M^2
BH CV ECHO MEAS - SV(CUBED): 87.4 ML
BH CV ECHO MEAS - SV(LVOT): 72.1 ML
BH CV ECHO MEAS - SV(MOD-SP4): 16 ML
BH CV ECHO MEAS - SV(TEICH): 69.8 ML
BH CV ECHO MEAS - TAPSE (>1.6): 1.8 CM
BH CV XLRA - RV BASE: 3.1 CM
BH CV XLRA - RV LENGTH: 5.7 CM
BH CV XLRA - RV MID: 2.8 CM
LEFT ATRIUM VOLUME INDEX: 34.5 ML/M2

## 2021-12-23 PROCEDURE — 99213 OFFICE O/P EST LOW 20 MIN: CPT | Performed by: INTERNAL MEDICINE

## 2021-12-23 PROCEDURE — 93306 TTE W/DOPPLER COMPLETE: CPT | Performed by: INTERNAL MEDICINE

## 2021-12-23 PROCEDURE — 93000 ELECTROCARDIOGRAM COMPLETE: CPT | Performed by: INTERNAL MEDICINE

## 2021-12-23 PROCEDURE — 25010000002 PERFLUTREN (DEFINITY) 8.476 MG IN SODIUM CHLORIDE (PF) 0.9 % 10 ML INJECTION: Performed by: INTERNAL MEDICINE

## 2021-12-23 PROCEDURE — 93306 TTE W/DOPPLER COMPLETE: CPT

## 2021-12-23 RX ADMIN — SODIUM CHLORIDE 3 ML: 9 INJECTION INTRAMUSCULAR; INTRAVENOUS; SUBCUTANEOUS at 08:57

## 2022-01-01 ENCOUNTER — TELEPHONE (OUTPATIENT)
Dept: FAMILY MEDICINE CLINIC | Facility: CLINIC | Age: 77
End: 2022-01-01

## 2022-03-08 RX ORDER — NITROGLYCERIN 0.4 MG/1
0.4 TABLET SUBLINGUAL
Qty: 25 TABLET | Refills: 3 | Status: SHIPPED | OUTPATIENT
Start: 2022-03-08 | End: 2022-08-26 | Stop reason: SDUPTHER

## 2022-03-17 ENCOUNTER — TELEPHONE (OUTPATIENT)
Dept: CARDIOLOGY | Facility: CLINIC | Age: 77
End: 2022-03-17

## 2022-03-17 NOTE — TELEPHONE ENCOUNTER
----- Message from Gabrielle Giles sent at 3/16/2022  2:32 PM EDT -----  Regarding: WHITNEY HAD A STROKE AND IS IN REHAB  DONAL GUTIERREZ (DAUGHTER) [phone] CALLED

## 2022-03-31 ENCOUNTER — OFFICE VISIT (OUTPATIENT)
Dept: CARDIOLOGY | Facility: CLINIC | Age: 77
End: 2022-03-31

## 2022-03-31 VITALS
HEART RATE: 73 BPM | BODY MASS INDEX: 28.28 KG/M2 | WEIGHT: 202 LBS | DIASTOLIC BLOOD PRESSURE: 92 MMHG | HEIGHT: 71 IN | SYSTOLIC BLOOD PRESSURE: 127 MMHG

## 2022-03-31 DIAGNOSIS — I10 ESSENTIAL HYPERTENSION: ICD-10-CM

## 2022-03-31 DIAGNOSIS — Z79.01 CHRONIC ANTICOAGULATION: ICD-10-CM

## 2022-03-31 DIAGNOSIS — Z95.2 S/P AVR: Primary | ICD-10-CM

## 2022-03-31 DIAGNOSIS — I48.0 PAROXYSMAL ATRIAL FIBRILLATION: ICD-10-CM

## 2022-03-31 PROCEDURE — 99214 OFFICE O/P EST MOD 30 MIN: CPT | Performed by: INTERNAL MEDICINE

## 2022-03-31 PROCEDURE — 93000 ELECTROCARDIOGRAM COMPLETE: CPT | Performed by: INTERNAL MEDICINE

## 2022-03-31 RX ORDER — WARFARIN SODIUM 7.5 MG/1
TABLET ORAL
COMMUNITY
Start: 2022-03-24 | End: 2022-09-01 | Stop reason: SDUPTHER

## 2022-03-31 RX ORDER — BUSPIRONE HYDROCHLORIDE 5 MG/1
5 TABLET ORAL 2 TIMES DAILY
COMMUNITY
Start: 2022-03-24 | End: 2022-04-05

## 2022-03-31 RX ORDER — LISINOPRIL 10 MG/1
10 TABLET ORAL DAILY
COMMUNITY
Start: 2022-03-24 | End: 2022-04-29

## 2022-03-31 RX ORDER — NICOTINE 21 MG/24HR
PATCH, TRANSDERMAL 24 HOURS TRANSDERMAL
COMMUNITY
Start: 2022-03-24 | End: 2022-04-25 | Stop reason: SDUPTHER

## 2022-03-31 RX ORDER — METOPROLOL SUCCINATE 50 MG/1
50 TABLET, EXTENDED RELEASE ORAL DAILY
Qty: 30 TABLET | Refills: 11 | Status: SHIPPED | OUTPATIENT
Start: 2022-03-31 | End: 2022-04-29 | Stop reason: ALTCHOICE

## 2022-03-31 RX ORDER — BACLOFEN 10 MG/1
TABLET ORAL
COMMUNITY
Start: 2022-03-24 | End: 2022-04-29

## 2022-03-31 RX ORDER — FUROSEMIDE 40 MG/1
40 TABLET ORAL 2 TIMES DAILY
COMMUNITY
End: 2022-04-25 | Stop reason: SDUPTHER

## 2022-03-31 RX ORDER — ATORVASTATIN CALCIUM 40 MG/1
40 TABLET, FILM COATED ORAL DAILY
COMMUNITY
Start: 2022-03-24 | End: 2022-04-18 | Stop reason: SDUPTHER

## 2022-04-05 ENCOUNTER — OFFICE VISIT (OUTPATIENT)
Dept: FAMILY MEDICINE CLINIC | Facility: CLINIC | Age: 77
End: 2022-04-05

## 2022-04-05 VITALS
SYSTOLIC BLOOD PRESSURE: 112 MMHG | HEIGHT: 71 IN | TEMPERATURE: 97.9 F | DIASTOLIC BLOOD PRESSURE: 62 MMHG | OXYGEN SATURATION: 97 % | HEART RATE: 48 BPM | BODY MASS INDEX: 27.86 KG/M2 | WEIGHT: 199 LBS

## 2022-04-05 DIAGNOSIS — I71.40 ABDOMINAL AORTIC ANEURYSM (AAA) WITHOUT RUPTURE: ICD-10-CM

## 2022-04-05 DIAGNOSIS — I63.411 CEREBROVASCULAR ACCIDENT (CVA) DUE TO EMBOLISM OF RIGHT MIDDLE CEREBRAL ARTERY: ICD-10-CM

## 2022-04-05 DIAGNOSIS — I48.91 NEW ONSET ATRIAL FIBRILLATION: Primary | ICD-10-CM

## 2022-04-05 DIAGNOSIS — I25.5 ISCHEMIC CARDIOMYOPATHY: ICD-10-CM

## 2022-04-05 DIAGNOSIS — Z95.2 S/P AVR: ICD-10-CM

## 2022-04-05 DIAGNOSIS — I35.0 SEVERE AORTIC STENOSIS: ICD-10-CM

## 2022-04-05 DIAGNOSIS — E11.9 TYPE 2 DIABETES MELLITUS WITHOUT COMPLICATION, WITHOUT LONG-TERM CURRENT USE OF INSULIN: ICD-10-CM

## 2022-04-05 DIAGNOSIS — N18.30 STAGE 3 CHRONIC KIDNEY DISEASE, UNSPECIFIED WHETHER STAGE 3A OR 3B CKD: ICD-10-CM

## 2022-04-05 DIAGNOSIS — G47.00 INSOMNIA, UNSPECIFIED TYPE: ICD-10-CM

## 2022-04-05 PROBLEM — R79.89 ELEVATED TROPONIN: Status: ACTIVE | Noted: 2022-03-09

## 2022-04-05 PROBLEM — I67.2 ATHEROSCLEROTIC CEREBROVASCULAR DISEASE: Status: ACTIVE | Noted: 2022-03-09

## 2022-04-05 PROBLEM — R77.8 ELEVATED TROPONIN: Status: ACTIVE | Noted: 2022-03-09

## 2022-04-05 PROBLEM — I63.9 CVA (CEREBRAL VASCULAR ACCIDENT): Status: ACTIVE | Noted: 2022-03-09

## 2022-04-05 PROBLEM — I5A NON-ISCHEMIC MYOCARDIAL INJURY (NON-TRAUMATIC): Status: ACTIVE | Noted: 2022-03-31

## 2022-04-05 PROBLEM — I63.511 ACUTE ISCHEMIC RIGHT MCA STROKE (HCC): Status: ACTIVE | Noted: 2022-03-09

## 2022-04-05 LAB — INR PPP: 3.8 (ref 0.9–1.1)

## 2022-04-05 PROCEDURE — 99204 OFFICE O/P NEW MOD 45 MIN: CPT | Performed by: INTERNAL MEDICINE

## 2022-04-05 PROCEDURE — 85610 PROTHROMBIN TIME: CPT | Performed by: INTERNAL MEDICINE

## 2022-04-05 PROCEDURE — 36416 COLLJ CAPILLARY BLOOD SPEC: CPT | Performed by: INTERNAL MEDICINE

## 2022-04-05 RX ORDER — DOXEPIN HYDROCHLORIDE 10 MG/1
10 CAPSULE ORAL NIGHTLY
Qty: 30 CAPSULE | Refills: 1 | Status: SHIPPED | OUTPATIENT
Start: 2022-04-05 | End: 2022-04-29

## 2022-04-05 RX ORDER — WARFARIN SODIUM 5 MG/1
5 TABLET ORAL
COMMUNITY

## 2022-04-05 NOTE — PROGRESS NOTES
Subjective   Conor Faustin is a 76 y.o. male.     Chief Complaint   Patient presents with   • Cerebrovascular Accident     3/9/22       History of Present Illness   Daughter was present during the history-taking and subsequent discussion (and for part of the physical exam) with this patient.  Patient agrees to the presence of the individual during this visit.    This is a 76-year-old man new to the [facility] primary care group and our office.  Previously been seen within the [facility] system by Dr. Mandel for primary care.  He was admitted on 3/9/2020-3/12/2022 secondary to an acute ischemic right MCA stroke, new onset atrial fibrillation, elevated troponin, dehydration with hyponatremia, coronary disease, hypertension, stage III chronic kidney disease, history of CABG 5/1/2017 and AVR on 5/1/2017 and ischemic cardiomyopathy.  Patient presented to the hospital with aphasia and drooling from left side of his face and left-sided weakness.  He was previously on aspirin.  Initial presentation demonstrate a BUN of 14 creatinine 1.8 sodium 124 and hemoglobin of 18.4.  CT perfusion demonstrated ischemic penumbra in the right frontal region but no large vessel occlusions.  Was noted to have a severe distal stenosis of the MCA and atherosclerosis of the carotid bifurcation of less than 50%.  He did not have any hemorrhage.  He was evaluated by neurology and cardiology in the hospital unfortunately patient cannot afford the Eliquis and started on warfarin and bridged with Lovenox.  INR goal is 2.0-3.0 his Dyazide was stopped in the hospital secondary to hyponatremia and acute kidney injury given IV fluids.  Echo showed EF of 35 to 40% and a severe aortic stenosis.  PT and OT and speech therapy were evaluating.  And patient was sent home.  Since he did follow-up with cardiology but they are very unhappy with her cardiologist and wishing the change.  When in hospital patient was noted to have elevated blood sugar and  A1c was performed demonstrating a 7.3% on 3/12/2022.  Discharge sodium 130, BUN 13 creatinine of 1.80 with a GFR of 37.  INR 1.3 on 3/16/2022.  Discharged from [facility] rehab in [place] till discharged on 3/19/22.  [facility] is following for SN with INR, speech therapy, PT and OT.  Last A1c was 3.9 on 7.5 mg daily on 3/29/22.     He is currently doing better with only mild residual left sided weakness and mild aphasia.  He has been having low BP and not sleeping since the stroke.  Is taking the metoprolol tartrate 25 mg BID, furosemide 40 mg BID and stopped the lisinopril.    Here for follow up INR evaluation.  Currently anticoagulated with warfarin for afib and history of stroke.  The patient is taking 5 mg Sat and Sun and 7.5 mg rest of the week.  The current INR goal is 2-3.  The INR is currently 3.8.  No significant interval events, easy bleeding, bruising, epistaxis, fever, weakness or numbness.  He does have some bruising on his left arm but that is all.    Here for follow-up of diabetes.  Patient states to have been compliant with medications.  Last A1c 3/12/22 of 7.3%.  No episodes of hypoglycemia, nausea, vomiting, new rashes, syncope or other issues.  Denies any difficulties and on no medications.    Difficulty sleeping at night with mind racing and is asking for help.  The buspirone is not helping.    History of 3.7 cm distal infrarenal abdominal aortic aneurysm with atherosclerotic calcification.  Last evaluated 1/20/16.    The following portions of the patient's history were reviewed and updated as appropriate: allergies, current medications, past family history, past medical history, past social history, past surgical history and problem list.    Depression Screen:  No flowsheet data found.    Past Medical History:   Diagnosis Date   • AAA (abdominal aortic aneurysm) ([facility])    • Benign essential hypertension    • COPD (chronic obstructive pulmonary disease) (Formerly [facility])    • Coronary artery disease    •  Renal cyst     RIGHT   • Smoker     1 PPD   • Stroke (HCC)        Past Surgical History:   Procedure Laterality Date   • AORTIC VALVE REPAIR/REPLACEMENT     • CARDIAC CATHETERIZATION     • CARDIAC CATHETERIZATION N/A 2016    Procedure: Left Heart Cath;  Surgeon: Irena Hale MD;  Location: [facility] INVASIVE LOCATION;  Service:    • CARDIAC CATHETERIZATION     • CORONARY ANGIOPLASTY     • CORONARY ARTERY BYPASS GRAFT N/A 2017    Procedure: INTRAOPERATIVE THIAGO, MIDLINE STERNOTOMY, CORONARY ARTERY BYPASS GRAFTING X 4 UTILIZING LEFT INTERNAL MAMMARY ARTERY AND RIGHT ENDOSCOPICALLY HARVESTED GREATER SAPHENOUS VEIN, AORTIC VALVE REPLACEMENT;  Surgeon: Jassi eD Jesus MD;  Location: [facility] OR;  Service:    • PERIPHERAL ARTERIAL STENT GRAFT         Family History   Problem Relation Age of Onset   • No Known Problems Mother    • No Known Problems Father    • No Known Problems Sister    • No Known Problems Brother    • No Known Problems Maternal Aunt    • No Known Problems Maternal Uncle    • No Known Problems Paternal Aunt    • No Known Problems Paternal Uncle    • No Known Problems Maternal Grandmother    • No Known Problems Maternal Grandfather    • Heart disease Paternal Grandmother    • Stroke Paternal Grandmother    • No Known Problems Paternal Grandfather        Social History     Socioeconomic History   • Marital status:    Tobacco Use   • Smoking status: Former Smoker     Packs/day: 1.00     Years: 64.00     Pack years: 64.00     Types: Cigarettes     Quit date: 3/9/2022     Years since quittin.0   • Smokeless tobacco: Never Used   Vaping Use   • Vaping Use: Never used   Substance and Sexual Activity   • Alcohol use: No   • Drug use: No   • Sexual activity: Defer       Current Outpatient Medications   Medication Sig Dispense Refill   • Accu-Chek FastClix Lancets misc USE UTD D     • Accu-Chek Guide test strip USE TO TEST D     • atorvastatin (LIPITOR) 40 MG tablet Take 40 mg by  mouth Daily.     • Blood Glucose Monitoring Suppl (Accu-Chek Guide) w/Device kit FPD     • furosemide (LASIX) 40 MG tablet Take 40 mg by mouth 2 (Two) Times a Day.     • lisinopril (PRINIVIL,ZESTRIL) 10 MG tablet Take 10 mg by mouth Daily.     • metoprolol succinate XL (TOPROL-XL) 50 MG 24 hr tablet Take 1 tablet by mouth Daily. 30 tablet 11   • metoprolol tartrate (LOPRESSOR) 25 MG tablet Take 25 mg by mouth 2 (Two) Times a Day.     • nicotine (NICODERM CQ) 21 MG/24HR patch APPLY 1 PATCH ON SKIN EVERY DAY     • nitroglycerin (NITROSTAT) 0.4 MG SL tablet Place 1 tablet under the tongue Every 5 (Five) Minutes As Needed for Chest Pain. Take no more than 3 doses in 15 minutes. 25 tablet 3   • warfarin (COUMADIN) 5 MG tablet Take 5 mg by mouth Daily. Saturday and Sunday     • warfarin (COUMADIN) 7.5 MG tablet Monday through Friday     • baclofen (LIORESAL) 10 MG tablet TAKE 1/2 TABLET BY MOUTH EVERY 24 HOURS     • doxepin (SINEquan) 10 MG capsule Take 1 capsule by mouth Every Night. 30 capsule 1     No current facility-administered medications for this visit.       Review of Systems   Constitutional: Negative for activity change, appetite change, fatigue, fever, unexpected weight gain and unexpected weight loss.   HENT: Negative for nosebleeds, rhinorrhea, trouble swallowing and voice change.    Eyes: Negative for visual disturbance.   Respiratory: Negative for cough, chest tightness, shortness of breath and wheezing.    Cardiovascular: Negative for chest pain, palpitations and leg swelling.   Gastrointestinal: Negative for abdominal pain, blood in stool, constipation, diarrhea, nausea, vomiting, GERD and indigestion.   Genitourinary: Negative for dysuria, frequency and hematuria.   Musculoskeletal: Negative for arthralgias, back pain and myalgias.   Skin: Negative for rash and wound.   Neurological: Negative for dizziness, tremors, weakness, light-headedness, numbness, headache and memory problem.        Mild aphasia  "and mild left arm/hand/leg weakness but is ambulating.   Hematological: Negative for adenopathy. Does not bruise/bleed easily.   Psychiatric/Behavioral: Negative for sleep disturbance and depressed mood. The patient is not nervous/anxious.        Objective   /62 (BP Location: Right arm, Patient Position: Sitting, Cuff Size: Adult)   Pulse (!) 48   Temp 97.9 °F (36.6 °C) (Temporal)   Ht 180.3 cm (70.98\")   Wt 90.3 kg (199 lb)   SpO2 97%   BMI 27.77 kg/m²     Physical Exam  Vitals and nursing note reviewed.   Constitutional:       General: He is not in acute distress.     Appearance: He is well-developed. He is not diaphoretic.   HENT:      Head: Normocephalic and atraumatic.      Right Ear: External ear normal.      Left Ear: External ear normal.      Nose: Nose normal.   Eyes:      Conjunctiva/sclera: Conjunctivae normal.      Pupils: Pupils are equal, round, and reactive to light.   Neck:      Thyroid: No thyromegaly.      Trachea: No tracheal deviation.   Cardiovascular:      Rate and Rhythm: Normal rate. Rhythm irregularly irregular.      Heart sounds: Normal heart sounds. No murmur heard.    No friction rub. No gallop.   Pulmonary:      Effort: Pulmonary effort is normal. No respiratory distress.      Breath sounds: Normal breath sounds.   Abdominal:      General: Bowel sounds are normal.      Palpations: Abdomen is soft. There is no mass.      Tenderness: There is no abdominal tenderness. There is no guarding.   Musculoskeletal:         General: Normal range of motion.      Cervical back: Normal range of motion and neck supple.      Comments: Mild aphasia and mild left arm/hand/leg weakness 4/5 strength but is ambulating.     Lymphadenopathy:      Cervical: No cervical adenopathy.   Skin:     General: Skin is warm and dry.      Capillary Refill: Capillary refill takes less than 2 seconds.      Findings: No rash.   Neurological:      Mental Status: He is alert and oriented to person, place, and time. "      Motor: No abnormal muscle tone.      Deep Tendon Reflexes: Reflexes normal.   Psychiatric:         Behavior: Behavior normal.         Thought Content: Thought content normal.         Judgment: Judgment normal.         Recent Results (from the past 2016 hour(s))   TSH    Collection Time: 03/01/22  8:34 AM    Specimen: Fresh Frozen Plasma    Specimen type and source: Plasma, Blood   Result Value Ref Range    TSH 2.290 0.27 - 4.20 u(iU)/mL   COMPREHENSIVE METABOLIC PANEL    Collection Time: 03/01/22  8:34 AM    Specimen: Fresh Frozen Plasma    Specimen type and source: Plasma, Blood   Result Value Ref Range    Sodium 129 (L) 136 - 145 mmol/L    Potassium 4.4 3.5 - 5.1 mmol/L    Chloride 94 (L) 98 - 107 mmol/L    Total CO2 25 22 - 29 mmol/L    Glucose 163 (H) 74 - 99 mg/dL    BUN 13 8 - 26 mg/dL    Creatinine 1.58 (H) 0.73 - 1.18 mg/dL    BUN/Creatinine Ratio 8.0 (L) 10.0 - 20.0 RATIO    Est GFR by Clearance 43 (L) >60 /1.73 m2    Total Protein 6.8 6.2 - 8.0 g/dL    Albumin 3.8 3.2 - 4.6 g/dL    Globulin 3.0 1.5 - 4.5 g/dL    A/G Ratio 1.2 1.1 - 2.5 RATIO    Calcium 9.1 8.4 - 10.2 mg/dL    Total Bilirubin 0.4 0.2 - 1.2 mg/dL    AST (SGOT) 23 10 - 50 U/L    ALT (SGPT) 10 10 - 50 U/L    Alkaline Phosphatase 97 40 - 150 U/L   HEMOGLOBIN A1C    Collection Time: 03/01/22  8:34 AM    Specimen type and source: Whole Blood, Blood   Result Value Ref Range    Hemoglobin A1C 7.3 (H) 4.3 - 5.6 %    Mean Bld Glu Estim. 162 mg/dL   CBC AND DIFFERENTIAL    Collection Time: 03/01/22  8:34 AM    Specimen type and source: Whole Blood, Blood   Result Value Ref Range    WBC 10.44 4.5 - 11.0 10*3/uL    RBC 5.62 4.5 - 5.9 10*6/uL    Hemoglobin 16.1 13.5 - 17.5 g/dL    Hematocrit 48.7 41.0 - 53.0 %    MCV 86.7 80.0 - 100.0 fL    MCH 28.6 26.0 - 34.0 pg    MCHC 33.1 31.0 - 37.0 g/dL    RDW 13.8 12.0 - 16.8 %    Platelets 226 140 - 440 10*3/uL    MPV 9.9 8.4 - 12.4 fL    Differential Type Physician Office CBC w/AutoDiff (arb'U)     Neutrophil Rel % 71.6 45 - 80 %    Basophil Rel % 0.7 0 - 2 %    Basophils Absolute 0.07 0.0 - 0.2 10*3/uL    Eosinophil % 2.1 0 - 7 %    Eosinophils Absolute 0.22 0.0 - 0.8 10*3/uL    Immature Grans % 0.4 0.0 - 1.0 %    Immature Grans, Absolute 0.04 0.00 - 0.10 10*3/uL    Lymphocytes Absolute 1.59 0.7 - 5.5 10*3/uL    Lymphocyte Rel % 15.2 15 - 50 %    Monocytes Absolute 1.04 0.0 - 1.7 10*3/uL    Monocyte Rel % 10.0 0 - 15 %    Neutrophils Absolute 7.48 2.0 - 8.8 10*3/uL   APTT    Collection Time: 03/09/22 11:50 AM    Specimen: Fresh Frozen Plasma    Specimen type and source: Plasma, Blood   Result Value Ref Range    PTT 38.6 (H) 25.1 - 36.5 s   PROTIME-INR    Collection Time: 03/09/22 11:50 AM    Specimen: Fresh Frozen Plasma    Specimen type and source: Plasma, Blood   Result Value Ref Range    Protime 11.0 10.3 - 13.3 s    INR 1.0 INR   CBC AND DIFFERENTIAL    Collection Time: 03/09/22 11:50 AM    Specimen type and source: Whole Blood, Blood   Result Value Ref Range    WBC 13.21 (H) 4.5 - 11.0 10*3/uL    RBC 5.75 4.5 - 5.9 10*6/uL    Hemoglobin 16.8 13.5 - 17.5 g/dL    Hematocrit 49.3 41.0 - 53.0 %    MCV 85.7 80.0 - 100.0 fL    MCH 29.2 26.0 - 34.0 pg    MCHC 34.1 31.0 - 37.0 g/dL    RDW 13.6 12.0 - 16.8 %    Platelets 243 140 - 440 10*3/uL    MPV 9.3 8.4 - 12.4 fL    Differential Type Hospital CBC w/AutoDiff (arb'U)    Neutrophil Rel % 82.5 (H) 45 - 80 %    Lymphocyte Rel % 10.1 (L) 15 - 50 %    Monocyte Rel % 6.2 0 - 15 %    Eosinophil % 0.2 0 - 7 %    Basophil Rel % 0.5 0 - 2 %    Immature Grans % 0.5 0.0 - 1.0 %    nRBC 0 0 /100(WBC)    Neutrophils Absolute 10.89 (H) 2.0 - 8.8 10*3/uL    Lymphocytes Absolute 1.33 0.7 - 5.5 10*3/uL    Monocytes Absolute 0.82 0.0 - 1.7 10*3/uL    Eosinophils Absolute 0.03 0.0 - 0.8 10*3/uL    Basophils Absolute 0.07 0.0 - 0.2 10*3/uL    Immature Grans, Absolute 0.07 0.00 - 0.10 10*3/uL   TROPONIN (IN-HOUSE)    Collection Time: 03/09/22 11:50 AM    Specimen: Fresh Frozen  Plasma    Specimen type and source: Plasma, Blood   Result Value Ref Range    Troponin I 0.324 (C) 0.000 - 0.034 ng/mL   TYPE AND SCREEN    Collection Time: 03/09/22 11:50 AM    Specimen type and source: Whole Blood, Blood   Result Value Ref Range    ABORh A Positive     Antibody Screen Negative     Crossmatch Expiration 03/12/2022,2359    TROPONIN (IN-HOUSE)    Collection Time: 03/09/22  7:48 PM    Specimen: Fresh Frozen Plasma    Specimen type and source: Plasma, Blood   Result Value Ref Range    Troponin I 0.284 (C) 0.000 - 0.034 ng/mL   T4, FREE    Collection Time: 03/10/22  2:24 AM    Specimen type and source: Serum, Blood   Result Value Ref Range    Free T4 1.13 0.7 - 1.48 ng/dL   TROPONIN (IN-HOUSE)    Collection Time: 03/10/22  2:24 AM    Specimen: Fresh Frozen Plasma    Specimen type and source: Plasma, Blood   Result Value Ref Range    Troponin I 0.239 (C) 0.000 - 0.034 ng/mL   HEMOGLOBIN A1C    Collection Time: 03/10/22  2:24 AM    Specimen type and source: Whole Blood, Blood   Result Value Ref Range    Hemoglobin A1C 7.3 (H) 4.3 - 5.6 %    Mean Bld Glu Estim. 163 mg/dL   CBC AND DIFFERENTIAL    Collection Time: 03/10/22  2:24 AM    Specimen type and source: Whole Blood, Blood   Result Value Ref Range    WBC 10.99 4.5 - 11.0 10*3/uL    RBC 5.28 4.5 - 5.9 10*6/uL    Hemoglobin 15.1 13.5 - 17.5 g/dL    Hematocrit 45.0 41.0 - 53.0 %    MCV 85.2 80.0 - 100.0 fL    MCH 28.6 26.0 - 34.0 pg    MCHC 33.6 31.0 - 37.0 g/dL    RDW 13.6 12.0 - 16.8 %    Platelets 208 140 - 440 10*3/uL    MPV 9.6 8.4 - 12.4 fL    Differential Type Hospital CBC w/AutoDiff (arb'U)    Neutrophil Rel % 81.2 (H) 45 - 80 %    Lymphocyte Rel % 8.9 (L) 15 - 50 %    Monocyte Rel % 8.2 0 - 15 %    Eosinophil % 0.9 0 - 7 %    Basophil Rel % 0.3 0 - 2 %    Immature Grans % 0.5 0.0 - 1.0 %    nRBC 0 0 /100(WBC)    Neutrophils Absolute 8.92 (H) 2.0 - 8.8 10*3/uL    Lymphocytes Absolute 0.98 0.7 - 5.5 10*3/uL    Monocytes Absolute 0.90 0.0 - 1.7  10*3/uL    Eosinophils Absolute 0.10 0.0 - 0.8 10*3/uL    Basophils Absolute 0.03 0.0 - 0.2 10*3/uL    Immature Grans, Absolute 0.06 0.00 - 0.10 10*3/uL   PROTIME-INR    Collection Time: 03/11/22  3:26 AM    Specimen: Fresh Frozen Plasma    Specimen type and source: Plasma, Blood   Result Value Ref Range    Protime 12.1 10.3 - 13.3 s    INR 1.1 INR   HEMOGLOBIN A1C    Collection Time: 03/12/22  5:02 AM    Specimen type and source: Whole Blood, Blood   Result Value Ref Range    Hemoglobin A1C 7.3 (H) 4.3 - 5.6 %    Mean Bld Glu Estim. 163 mg/dL   PROTIME-INR    Collection Time: 03/12/22  5:02 AM    Specimen: Fresh Frozen Plasma    Specimen type and source: Plasma, Blood   Result Value Ref Range    Protime 12.3 10.3 - 13.3 s    INR 1.1 INR   CBC AND DIFFERENTIAL    Collection Time: 03/16/22  4:38 PM    Specimen type and source: Whole Blood, Blood   Result Value Ref Range    WBC 8.14 4.5 - 11.0 10*3/uL    RBC 5.06 4.5 - 5.9 10*6/uL    Hemoglobin 14.4 13.5 - 17.5 g/dL    Hematocrit 44.6 41.0 - 53.0 %    MCV 88.1 80.0 - 100.0 fL    MCH 28.5 26.0 - 34.0 pg    MCHC 32.3 31.0 - 37.0 g/dL    RDW 13.8 12.0 - 16.8 %    Platelets 186 140 - 440 10*3/uL    MPV 9.8 8.4 - 12.4 fL    Differential Type Hospital CBC w/AutoDiff (arb'U)    Neutrophil Rel % 64.3 45 - 80 %    Lymphocyte Rel % 18.2 15 - 50 %    Monocyte Rel % 13.8 0 - 15 %    Eosinophil % 2.8 0 - 7 %    Basophil Rel % 0.5 0 - 2 %    Immature Grans % 0.4 0.0 - 1.0 %    nRBC 0 0 /100(WBC)    Neutrophils Absolute 5.24 2.0 - 8.8 10*3/uL    Lymphocytes Absolute 1.48 0.7 - 5.5 10*3/uL    Monocytes Absolute 1.12 0.0 - 1.7 10*3/uL    Eosinophils Absolute 0.23 0.0 - 0.8 10*3/uL    Basophils Absolute 0.04 0.0 - 0.2 10*3/uL    Immature Grans, Absolute 0.03 0.00 - 0.10 10*3/uL   PROTIME-INR    Collection Time: 03/16/22  4:38 PM    Specimen: Fresh Frozen Plasma    Specimen type and source: Plasma, Blood   Result Value Ref Range    Protime 15.5 (H) 10.3 - 13.3 s    INR 1.3 INR    TROPONIN (IN-HOUSE)    Collection Time: 03/16/22  4:38 PM    Specimen: Fresh Frozen Plasma    Specimen type and source: Plasma, Blood   Result Value Ref Range    Troponin I 0.098 (H) 0.000 - 0.034 ng/mL   APTT    Collection Time: 03/16/22  4:38 PM    Specimen: Fresh Frozen Plasma    Specimen type and source: Plasma, Blood   Result Value Ref Range    PTT 50.4 (H) 25.1 - 36.5 s   TYPE AND SCREEN    Collection Time: 03/16/22  4:39 PM    Specimen type and source: Whole Blood, Blood   Result Value Ref Range    ABORh A Positive     Antibody Screen Negative     Crossmatch Expiration 03/19/2022,2359    POCT INR    Collection Time: 04/05/22 12:49 PM    Specimen: Blood   Result Value Ref Range    INR 3.8 (A) 0.9 - 1.1     Assessment/Plan   Diagnoses and all orders for this visit:    1. New onset atrial fibrillation (HCC) (Primary)  -     Ambulatory Referral to Cardiology  -     POCT INR    2. S/P AVR  -     CBC & Differential    3. Severe aortic stenosis  -     CBC & Differential    4. Stage 3 chronic kidney disease, unspecified whether stage 3a or 3b CKD (HCC)  -     Basic Metabolic Panel  -     CBC & Differential    5. Ischemic cardiomyopathy  -     Ambulatory Referral to Cardiology  -     CBC & Differential    6. Cerebrovascular accident (CVA) due to embolism of right middle cerebral artery (HCC)  -     Ambulatory Referral to Cardiology  -     Ambulatory Referral to Neurology    7. Type 2 diabetes mellitus without complication, without long-term current use of insulin ([facility])  -     Ambulatory Referral to Cardiology  -     Basic Metabolic Panel    8. Abdominal aortic aneurysm (AAA) without rupture ([facility])  -     US AAA Screen Limited; Future    9. Insomnia, unspecified type  -     doxepin (SINEquan) 10 MG capsule; Take 1 capsule by mouth Every Night.  Dispense: 30 capsule; Refill: 1        Patient with atrial fibrillation and unnoted MCA distribution CVA with some residual but improving left-sided hemiparesis and aphasia.   Noted that the INR indicates over anticoagulation.  Hold the warfarin for 2 days then restart at 5 mg daily due to INR elevated beyond the goal range.  Patient with multiple cardiac issues and will refer to a new cardiologist per patient request.  Type 2 diabetes slightly uncontrolled.  Continue with his current medication following his diet.  Note has some mild hyponatremia in the hospital that had resolved and some renal issues will recheck the BMP along with the CBC.  Upon review of the chart did note that the patient has a history of an aortic aneurysm infrarenal and has not been reevaluated for a little while.  We will order an ultrasound of this as well.  We discussed his issues with his insomnia since he has had a stroke.  He does not want any kind of anxiety medicines or controlled substances.  We will therefore utilize doxepin 10 mg at night to see how this helps.  Continue with his home health with physical therapy, Occupational Therapy, speech therapy.  Follow-up with cardiology as scheduled.  He should also be seeing a neurologist.     · COVID-19 Precautions - Patient was compliant in wearing a mask. When I saw the patient, I used appropriate personal protective equipment (PPE) including mask and eye shield (standard procedure).  Additionally, I used gown and gloves if indicated.  Hand hygiene was completed before and after seeing the patient.  · Dictated utilizing Dragon Dictation

## 2022-04-06 DIAGNOSIS — N18.32 STAGE 3B CHRONIC KIDNEY DISEASE: Primary | ICD-10-CM

## 2022-04-06 LAB
BASOPHILS # BLD AUTO: 0.1 X10E3/UL (ref 0–0.2)
BASOPHILS NFR BLD AUTO: 1 %
BUN SERPL-MCNC: 20 MG/DL (ref 8–27)
BUN/CREAT SERPL: 10 (ref 10–24)
CALCIUM SERPL-MCNC: 9.7 MG/DL (ref 8.6–10.2)
CHLORIDE SERPL-SCNC: 96 MMOL/L (ref 96–106)
CO2 SERPL-SCNC: 24 MMOL/L (ref 20–29)
CREAT SERPL-MCNC: 1.93 MG/DL (ref 0.76–1.27)
EGFRCR SERPLBLD CKD-EPI 2021: 36 ML/MIN/1.73
EOSINOPHIL # BLD AUTO: 0.3 X10E3/UL (ref 0–0.4)
EOSINOPHIL NFR BLD AUTO: 3 %
ERYTHROCYTE [DISTWIDTH] IN BLOOD BY AUTOMATED COUNT: 13.4 % (ref 11.6–15.4)
GLUCOSE SERPL-MCNC: 139 MG/DL (ref 65–99)
HCT VFR BLD AUTO: 46.5 % (ref 37.5–51)
HGB BLD-MCNC: 15.6 G/DL (ref 13–17.7)
IMM GRANULOCYTES # BLD AUTO: 0 X10E3/UL (ref 0–0.1)
IMM GRANULOCYTES NFR BLD AUTO: 0 %
LYMPHOCYTES # BLD AUTO: 1 X10E3/UL (ref 0.7–3.1)
LYMPHOCYTES NFR BLD AUTO: 9 %
MCH RBC QN AUTO: 28.9 PG (ref 26.6–33)
MCHC RBC AUTO-ENTMCNC: 33.5 G/DL (ref 31.5–35.7)
MCV RBC AUTO: 86 FL (ref 79–97)
MONOCYTES # BLD AUTO: 0.8 X10E3/UL (ref 0.1–0.9)
MONOCYTES NFR BLD AUTO: 8 %
NEUTROPHILS # BLD AUTO: 8.3 X10E3/UL (ref 1.4–7)
NEUTROPHILS NFR BLD AUTO: 79 %
PLATELET # BLD AUTO: 230 X10E3/UL (ref 150–450)
POTASSIUM SERPL-SCNC: 3.8 MMOL/L (ref 3.5–5.2)
RBC # BLD AUTO: 5.4 X10E6/UL (ref 4.14–5.8)
SODIUM SERPL-SCNC: 139 MMOL/L (ref 134–144)
WBC # BLD AUTO: 10.5 X10E3/UL (ref 3.4–10.8)

## 2022-04-08 ENCOUNTER — TELEPHONE (OUTPATIENT)
Dept: FAMILY MEDICINE CLINIC | Facility: CLINIC | Age: 77
End: 2022-04-08

## 2022-04-08 NOTE — TELEPHONE ENCOUNTER
Pt daughter called and is concerned about the pt blood pressure and heart. As of 12:45Pm today pt BP is 113/47 and HR is 52.  She ask that her call is returned.

## 2022-04-11 NOTE — TELEPHONE ENCOUNTER
D/W pt over weekend on call.  Informed to hold lasix one day and use 1/2 tablet if diastolic less than 60.  Daughter aware.

## 2022-04-13 ENCOUNTER — TELEPHONE (OUTPATIENT)
Dept: CARDIOLOGY | Facility: CLINIC | Age: 77
End: 2022-04-13

## 2022-04-13 ENCOUNTER — TELEPHONE (OUTPATIENT)
Dept: FAMILY MEDICINE CLINIC | Facility: CLINIC | Age: 77
End: 2022-04-13

## 2022-04-13 NOTE — TELEPHONE ENCOUNTER
Results called to family and INR is now in range and is to continue the current warfarin 5 mg daily.

## 2022-04-13 NOTE — TELEPHONE ENCOUNTER
Pt is calling to make a new pt appointment.    It looks like a referral was sent over on 4/5/22 by .    Can you please call pt and make him an appointment?     PT#: [phone]

## 2022-04-13 NOTE — TELEPHONE ENCOUNTER
FYI    -Incoming call from Delphine from [facility] for INR results    INR 2.7  Pt 32.3    Delphine can be reached at   Phone [phone]

## 2022-04-13 NOTE — TELEPHONE ENCOUNTER
Caller: DONAL GUTIERREZ    Relationship to patient: Emergency Contact    Best call back number: [phone]    Patient is needing: PATIENTS DAUGHTER DONAL IS CALLING TO GET INR  RESULTS FOR HER FATHER. PLEASE CALL AND ADVISE.

## 2022-04-17 PROBLEM — I48.0 PAROXYSMAL ATRIAL FIBRILLATION: Status: ACTIVE | Noted: 2022-03-09

## 2022-04-17 PROBLEM — Z79.01 CHRONIC ANTICOAGULATION: Status: ACTIVE | Noted: 2022-04-17

## 2022-04-18 RX ORDER — ATORVASTATIN CALCIUM 40 MG/1
40 TABLET, FILM COATED ORAL DAILY
Qty: 90 TABLET | Refills: 1 | Status: SHIPPED | OUTPATIENT
Start: 2022-04-18 | End: 2022-07-18 | Stop reason: SINTOL

## 2022-04-18 NOTE — TELEPHONE ENCOUNTER
Caller: FaustinMarilee    Relationship: Emergency Contact     Best call back number: [phone]    Requested Prescriptions:   Requested Prescriptions     Pending Prescriptions Disp Refills   • atorvastatin (LIPITOR) 40 MG tablet       Sig: Take 1 tablet by mouth Daily.        Pharmacy where request should be sent: [id number] - [facility] [address] [facility] [address] [facility] - [phone]  - [phone] FX     Additional details provided by patient: PATIENT HAS 2 DAYS LEFT    Does the patient have less than a 3 day supply:  [x] Yes  [] No    Chula Bender Rep   04/18/22 11:58 EDT

## 2022-04-20 ENCOUNTER — TELEPHONE (OUTPATIENT)
Dept: FAMILY MEDICINE CLINIC | Facility: CLINIC | Age: 77
End: 2022-04-20

## 2022-04-20 NOTE — TELEPHONE ENCOUNTER
FYI- no action needed    -Incoming call from Delphine from [facility] home health calling to confirm plan of care    -Asking if pt/ inr needs to be checked at todays visit, 04/20/2022    -Communicated after consulting with Tammy JACKSON that inr can be checked every 2 weeks.

## 2022-04-25 NOTE — TELEPHONE ENCOUNTER
Caller: DONAL GUTIERREZ    Relationship: Emergency Contact    Best call back number: [phone]     Requested Prescriptions:   Requested Prescriptions     Pending Prescriptions Disp Refills   • furosemide (LASIX) 40 MG tablet       Sig: Take 1 tablet by mouth 2 (Two) Times a Day.   • nicotine (NICODERM CQ) 21 MG/24HR patch          Pharmacy where request should be sent: [facility][id number] - [facility] [address] [address] AT [facility] OF [place] [address] & [address] [phone] [facility] [phone] FX     Additional details provided by patient:  PATIENT HAS 4 DAYS OF THE FUROSEMIDE AND IS OUT OF THE NICODERM PATCHES    Does the patient have less than a 3 day supply:  [] Yes  [x] No    Chula PHAN Rep   04/25/22 14:00 EDT

## 2022-04-26 RX ORDER — FUROSEMIDE 40 MG/1
40 TABLET ORAL 2 TIMES DAILY
Qty: 60 TABLET | Refills: 1 | Status: SHIPPED | OUTPATIENT
Start: 2022-04-26 | End: 2022-04-26

## 2022-04-26 RX ORDER — NICOTINE 21 MG/24HR
1 PATCH, TRANSDERMAL 24 HOURS TRANSDERMAL EVERY 24 HOURS
Qty: 30 PATCH | Refills: 1 | Status: SHIPPED | OUTPATIENT
Start: 2022-04-26 | End: 2022-09-01

## 2022-04-26 RX ORDER — FUROSEMIDE 40 MG/1
40 TABLET ORAL 2 TIMES DAILY
Qty: 180 TABLET | Refills: 1 | Status: SHIPPED | OUTPATIENT
Start: 2022-04-26 | End: 2023-02-14

## 2022-04-26 NOTE — TELEPHONE ENCOUNTER
Rx Refill Note  Requested Prescriptions     Pending Prescriptions Disp Refills   • furosemide (LASIX) 40 MG tablet [Pharmacy Med Name: FUROSEMIDE 40MG TABLETS] 180 tablet      Sig: TAKE 1 TABLET BY MOUTH TWICE DAILY      Last office visit with prescribing clinician: 4/5/2022      Next office visit with prescribing clinician:     Flory Chavez MA  04/26/22, 15:16 EDT

## 2022-04-26 NOTE — TELEPHONE ENCOUNTER
Rx Refill Note  Requested Prescriptions     Pending Prescriptions Disp Refills   • furosemide (LASIX) 40 MG tablet 60 tablet 1     Sig: Take 1 tablet by mouth 2 (Two) Times a Day.   • nicotine (NICODERM CQ) 21 MG/24HR patch 30 patch 1     Sig: Place 1 patch on the skin as directed by provider Daily.      Last office visit with prescribing clinician: 4/5/2022      Next office visit with prescribing clinician: due 6/5/22    Flory Chavez MA  04/26/22, 12:13 EDT

## 2022-04-27 ENCOUNTER — TELEPHONE (OUTPATIENT)
Dept: FAMILY MEDICINE CLINIC | Facility: CLINIC | Age: 77
End: 2022-04-27

## 2022-04-27 NOTE — TELEPHONE ENCOUNTER
Delphine with [facility] called to report PT and INR.  She also needs to be called with a new order.  She can be reached at [phone].      INR - 2.8    PT - 33.5

## 2022-04-28 NOTE — TELEPHONE ENCOUNTER
The INR is within range with her goal to be 2-3.  Currently is 2.8.  Continue current warfarin dose and recheck the INR in 2 weeks.

## 2022-04-29 ENCOUNTER — OFFICE VISIT (OUTPATIENT)
Dept: CARDIOLOGY | Facility: CLINIC | Age: 77
End: 2022-04-29

## 2022-04-29 VITALS
SYSTOLIC BLOOD PRESSURE: 120 MMHG | HEART RATE: 60 BPM | HEIGHT: 71 IN | WEIGHT: 203 LBS | DIASTOLIC BLOOD PRESSURE: 78 MMHG | BODY MASS INDEX: 28.42 KG/M2

## 2022-04-29 DIAGNOSIS — E11.59 TYPE 2 DIABETES MELLITUS WITH OTHER CIRCULATORY COMPLICATION, WITHOUT LONG-TERM CURRENT USE OF INSULIN: Chronic | ICD-10-CM

## 2022-04-29 DIAGNOSIS — I25.10 CHRONIC CORONARY ARTERY DISEASE: ICD-10-CM

## 2022-04-29 DIAGNOSIS — I63.411 CEREBROVASCULAR ACCIDENT (CVA) DUE TO EMBOLISM OF RIGHT MIDDLE CEREBRAL ARTERY: ICD-10-CM

## 2022-04-29 DIAGNOSIS — Z95.2 S/P AVR: Primary | ICD-10-CM

## 2022-04-29 DIAGNOSIS — N18.30 STAGE 3 CHRONIC KIDNEY DISEASE, UNSPECIFIED WHETHER STAGE 3A OR 3B CKD: ICD-10-CM

## 2022-04-29 DIAGNOSIS — Z95.1 S/P CABG (CORONARY ARTERY BYPASS GRAFT): ICD-10-CM

## 2022-04-29 DIAGNOSIS — I71.40 ABDOMINAL AORTIC ANEURYSM (AAA) WITHOUT RUPTURE: ICD-10-CM

## 2022-04-29 DIAGNOSIS — I48.0 PAROXYSMAL ATRIAL FIBRILLATION: ICD-10-CM

## 2022-04-29 PROCEDURE — 93000 ELECTROCARDIOGRAM COMPLETE: CPT | Performed by: INTERNAL MEDICINE

## 2022-04-29 PROCEDURE — 99214 OFFICE O/P EST MOD 30 MIN: CPT | Performed by: INTERNAL MEDICINE

## 2022-04-29 RX ORDER — METOPROLOL SUCCINATE 25 MG/1
25 TABLET, EXTENDED RELEASE ORAL DAILY
Qty: 90 TABLET | Refills: 3 | Status: SHIPPED | OUTPATIENT
Start: 2022-04-29

## 2022-04-29 NOTE — TELEPHONE ENCOUNTER
OK FOR HUB      LM with below     The INR is within range with her goal to be 2-3.  Currently is 2.8.  Continue current warfarin dose and recheck the INR in 2 weeks.

## 2022-05-02 ENCOUNTER — HOSPITAL ENCOUNTER (OUTPATIENT)
Dept: ULTRASOUND IMAGING | Facility: HOSPITAL | Age: 77
Discharge: HOME | End: 2022-05-02
Admitting: INTERNAL MEDICINE

## 2022-05-02 DIAGNOSIS — I71.40 ABDOMINAL AORTIC ANEURYSM (AAA) WITHOUT RUPTURE: Primary | ICD-10-CM

## 2022-05-02 DIAGNOSIS — I71.40 ABDOMINAL AORTIC ANEURYSM (AAA) WITHOUT RUPTURE: ICD-10-CM

## 2022-05-02 DIAGNOSIS — I72.3 ANEURYSM OF RIGHT COMMON ILIAC ARTERY: ICD-10-CM

## 2022-05-02 PROCEDURE — 76775 US EXAM ABDO BACK WALL LIM: CPT

## 2022-05-05 NOTE — TELEPHONE ENCOUNTER
Caller: Marilee Faustin    Relationship: Emergency Contact    Best call back number: [phone]    What is the best time to reach you: ANY    Who are you requesting to speak with (clinical staff, provider,  specific staff member): DR. MARTIN    What was the call regarding: LETTING DR. MARTIN KNOW THAT THE PATIENT IS NOT ABLE TO GET A CT UNTIL June 10TH AND WANTED TO CHECK IN WITH HIM AND LET HIM KNOW TO SEE IF THAT IS FINE OR HE NEEDS TO GET IN SOONER.     ALSO DAUGHTER WOULD LIKE TO BE ADDED TO [facility]. PLEASE MAIL FORM TO PATIENT ADDRESS.     Do you require a callback: YES

## 2022-05-11 ENCOUNTER — TRANSCRIBE ORDERS (OUTPATIENT)
Dept: ADMINISTRATIVE | Facility: HOSPITAL | Age: 77
End: 2022-05-11

## 2022-05-11 DIAGNOSIS — N18.30 STAGE 3 CHRONIC KIDNEY DISEASE, UNSPECIFIED WHETHER STAGE 3A OR 3B CKD: Primary | ICD-10-CM

## 2022-05-12 ENCOUNTER — CLINICAL SUPPORT (OUTPATIENT)
Dept: FAMILY MEDICINE CLINIC | Facility: CLINIC | Age: 77
End: 2022-05-12

## 2022-05-12 DIAGNOSIS — I63.9 CEREBROVASCULAR ACCIDENT (CVA), UNSPECIFIED MECHANISM: Primary | ICD-10-CM

## 2022-05-12 DIAGNOSIS — I63.411 CEREBROVASCULAR ACCIDENT (CVA) DUE TO EMBOLISM OF RIGHT MIDDLE CEREBRAL ARTERY: Primary | ICD-10-CM

## 2022-05-12 LAB — INR PPP: 2.6 (ref 0.9–1.1)

## 2022-05-12 PROCEDURE — 85610 PROTHROMBIN TIME: CPT | Performed by: INTERNAL MEDICINE

## 2022-05-12 PROCEDURE — 36416 COLLJ CAPILLARY BLOOD SPEC: CPT | Performed by: INTERNAL MEDICINE

## 2022-05-17 ENCOUNTER — HOSPITAL ENCOUNTER (OUTPATIENT)
Dept: ULTRASOUND IMAGING | Facility: HOSPITAL | Age: 77
Discharge: HOME | End: 2022-05-17
Admitting: INTERNAL MEDICINE

## 2022-05-17 DIAGNOSIS — N18.30 STAGE 3 CHRONIC KIDNEY DISEASE, UNSPECIFIED WHETHER STAGE 3A OR 3B CKD: ICD-10-CM

## 2022-05-17 PROCEDURE — 76775 US EXAM ABDO BACK WALL LIM: CPT

## 2022-05-26 ENCOUNTER — CLINICAL SUPPORT (OUTPATIENT)
Dept: FAMILY MEDICINE CLINIC | Facility: CLINIC | Age: 77
End: 2022-05-26

## 2022-05-26 DIAGNOSIS — Z79.01 CHRONIC ANTICOAGULATION: Primary | ICD-10-CM

## 2022-05-26 DIAGNOSIS — F41.9 ANXIETY: Primary | ICD-10-CM

## 2022-05-26 LAB — INR PPP: 2.4 (ref 2–3)

## 2022-05-26 PROCEDURE — 36416 COLLJ CAPILLARY BLOOD SPEC: CPT | Performed by: INTERNAL MEDICINE

## 2022-05-26 PROCEDURE — 85610 PROTHROMBIN TIME: CPT | Performed by: INTERNAL MEDICINE

## 2022-05-26 RX ORDER — HYDROXYZINE HYDROCHLORIDE 25 MG/1
25 TABLET, FILM COATED ORAL 3 TIMES DAILY PRN
Qty: 30 TABLET | Refills: 3 | Status: SHIPPED | OUTPATIENT
Start: 2022-05-26 | End: 2022-09-01

## 2022-06-10 ENCOUNTER — HOSPITAL ENCOUNTER (OUTPATIENT)
Dept: CT IMAGING | Facility: HOSPITAL | Age: 77
Discharge: HOME | End: 2022-06-10
Admitting: INTERNAL MEDICINE

## 2022-06-10 DIAGNOSIS — I72.3 ANEURYSM OF RIGHT COMMON ILIAC ARTERY: ICD-10-CM

## 2022-06-10 DIAGNOSIS — I71.40 ABDOMINAL AORTIC ANEURYSM (AAA) WITHOUT RUPTURE: ICD-10-CM

## 2022-06-10 LAB — CREAT BLDA-MCNC: 1.7 MG/DL (ref 0.6–1.3)

## 2022-06-10 PROCEDURE — 75635 CT ANGIO ABDOMINAL ARTERIES: CPT

## 2022-06-10 PROCEDURE — 0 IOPAMIDOL PER 1 ML: Performed by: INTERNAL MEDICINE

## 2022-06-10 PROCEDURE — 82565 ASSAY OF CREATININE: CPT

## 2022-06-10 RX ADMIN — IOPAMIDOL 95 ML: 755 INJECTION, SOLUTION INTRAVENOUS at 06:36

## 2022-06-13 ENCOUNTER — CLINICAL SUPPORT (OUTPATIENT)
Dept: FAMILY MEDICINE CLINIC | Facility: CLINIC | Age: 77
End: 2022-06-13

## 2022-06-13 DIAGNOSIS — I70.203 STENOSIS OF ARTERY OF BOTH LOWER EXTREMITIES: ICD-10-CM

## 2022-06-13 DIAGNOSIS — I71.40 ABDOMINAL AORTIC ANEURYSM (AAA) WITHOUT RUPTURE: Primary | ICD-10-CM

## 2022-06-13 DIAGNOSIS — R19.09 ABDOMINAL MASS OF OTHER SITE: ICD-10-CM

## 2022-06-13 DIAGNOSIS — I70.1 RENAL ARTERY STENOSIS, NATIVE, BILATERAL: ICD-10-CM

## 2022-06-13 DIAGNOSIS — I63.9 CEREBROVASCULAR ACCIDENT (CVA), UNSPECIFIED MECHANISM: Primary | ICD-10-CM

## 2022-06-13 DIAGNOSIS — R93.5 ABNORMAL CT OF THE ABDOMEN: ICD-10-CM

## 2022-06-13 LAB — INR PPP: 2 (ref 2–3)

## 2022-06-13 PROCEDURE — 85610 PROTHROMBIN TIME: CPT | Performed by: NURSE PRACTITIONER

## 2022-06-13 PROCEDURE — 36416 COLLJ CAPILLARY BLOOD SPEC: CPT | Performed by: NURSE PRACTITIONER

## 2022-06-13 NOTE — TELEPHONE ENCOUNTER
Hub staff attempted to follow warm transfer process and was unsuccessful     Caller: DONAL GUTIERREZ    Relationship to patient: Emergency Contact    Best call back number:     Patient is needing:   DONAL GUTIERREZ () [phone] (M)     RETURNING CALL FROM OFFICE REGARDING TEST RESULTS

## 2022-06-13 NOTE — TELEPHONE ENCOUNTER
The patient's daughter was returning Dr. Zendejas's phone call. He told me to have the patient schedule an in person appointment with Dr. Cristofer INIGUEZ. The earliest appointment available was 6/21/22 at 10:15 am. Is there anyway we could get the patient scheduled any earlier than this? Please advise [phone].

## 2022-06-15 ENCOUNTER — LAB (OUTPATIENT)
Dept: LAB | Facility: HOSPITAL | Age: 77
End: 2022-06-15

## 2022-06-15 ENCOUNTER — TRANSCRIBE ORDERS (OUTPATIENT)
Dept: ADMINISTRATIVE | Facility: HOSPITAL | Age: 77
End: 2022-06-15

## 2022-06-15 DIAGNOSIS — N18.30 STAGE 3 CHRONIC KIDNEY DISEASE, UNSPECIFIED WHETHER STAGE 3A OR 3B CKD: Primary | ICD-10-CM

## 2022-06-15 DIAGNOSIS — N18.30 STAGE 3 CHRONIC KIDNEY DISEASE, UNSPECIFIED WHETHER STAGE 3A OR 3B CKD: ICD-10-CM

## 2022-06-15 LAB
ALBUMIN SERPL-MCNC: 4.2 G/DL (ref 3.5–5.2)
ANION GAP SERPL CALCULATED.3IONS-SCNC: 11.5 MMOL/L (ref 5–15)
BUN SERPL-MCNC: 14 MG/DL (ref 8–23)
BUN/CREAT SERPL: 8.3 (ref 7–25)
CALCIUM SPEC-SCNC: 9.4 MG/DL (ref 8.6–10.5)
CHLORIDE SERPL-SCNC: 96 MMOL/L (ref 98–107)
CO2 SERPL-SCNC: 26.5 MMOL/L (ref 22–29)
CREAT SERPL-MCNC: 1.69 MG/DL (ref 0.76–1.27)
DEPRECATED RDW RBC AUTO: 40.7 FL (ref 37–54)
EGFRCR SERPLBLD CKD-EPI 2021: 41.8 ML/MIN/1.73
ERYTHROCYTE [DISTWIDTH] IN BLOOD BY AUTOMATED COUNT: 13 % (ref 12.3–15.4)
GLUCOSE SERPL-MCNC: 147 MG/DL (ref 65–99)
HCT VFR BLD AUTO: 43.2 % (ref 37.5–51)
HGB BLD-MCNC: 14.3 G/DL (ref 13–17.7)
MCH RBC QN AUTO: 28.7 PG (ref 26.6–33)
MCHC RBC AUTO-ENTMCNC: 33.1 G/DL (ref 31.5–35.7)
MCV RBC AUTO: 86.7 FL (ref 79–97)
PHOSPHATE SERPL-MCNC: 3.7 MG/DL (ref 2.5–4.5)
PLATELET # BLD AUTO: 194 10*3/MM3 (ref 140–450)
PMV BLD AUTO: 10.2 FL (ref 6–12)
POTASSIUM SERPL-SCNC: 4.3 MMOL/L (ref 3.5–5.2)
RBC # BLD AUTO: 4.98 10*6/MM3 (ref 4.14–5.8)
SODIUM SERPL-SCNC: 134 MMOL/L (ref 136–145)
WBC NRBC COR # BLD: 8.24 10*3/MM3 (ref 3.4–10.8)

## 2022-06-15 PROCEDURE — 85027 COMPLETE CBC AUTOMATED: CPT

## 2022-06-15 PROCEDURE — 36415 COLL VENOUS BLD VENIPUNCTURE: CPT

## 2022-06-15 PROCEDURE — 80069 RENAL FUNCTION PANEL: CPT

## 2022-06-21 ENCOUNTER — OFFICE VISIT (OUTPATIENT)
Dept: FAMILY MEDICINE CLINIC | Facility: CLINIC | Age: 77
End: 2022-06-21

## 2022-06-21 VITALS
TEMPERATURE: 98.1 F | BODY MASS INDEX: 28.14 KG/M2 | WEIGHT: 201 LBS | DIASTOLIC BLOOD PRESSURE: 80 MMHG | SYSTOLIC BLOOD PRESSURE: 132 MMHG | OXYGEN SATURATION: 98 % | HEART RATE: 78 BPM | HEIGHT: 71 IN

## 2022-06-21 DIAGNOSIS — I70.1 RENAL ARTERY STENOSIS: ICD-10-CM

## 2022-06-21 DIAGNOSIS — F41.9 ANXIETY AND DEPRESSION: Primary | ICD-10-CM

## 2022-06-21 DIAGNOSIS — F41.0 PANIC ATTACKS: ICD-10-CM

## 2022-06-21 DIAGNOSIS — F32.A ANXIETY AND DEPRESSION: Primary | ICD-10-CM

## 2022-06-21 DIAGNOSIS — R19.09 UPPER ABDOMINAL MASS: ICD-10-CM

## 2022-06-21 DIAGNOSIS — I71.40 ABDOMINAL AORTIC ANEURYSM (AAA) WITHOUT RUPTURE: ICD-10-CM

## 2022-06-21 PROCEDURE — 99214 OFFICE O/P EST MOD 30 MIN: CPT | Performed by: INTERNAL MEDICINE

## 2022-06-21 RX ORDER — ESCITALOPRAM OXALATE 10 MG/1
10 TABLET ORAL DAILY
Qty: 30 TABLET | Refills: 3 | Status: SHIPPED | OUTPATIENT
Start: 2022-06-21 | End: 2022-09-01

## 2022-06-21 NOTE — PROGRESS NOTES
Subjective   Conor Faustin is a 77 y.o. male.     Chief Complaint   Patient presents with   • panic attacks       History of Present Illness   Wife was present during the history-taking and subsequent discussion (and for part of the physical exam) with this patient.  Patient agrees to the presence of the individual during this visit.    Follow-up after having the CT angiogram of the abdomen performed which demonstrated infrarenal abdominal aortic aneurysm 5.2 cm which was increased from 4.6 cm in 2019, ectasia of the proximal right common iliac artery and minimal bilateral internal iliac artery aneurysms, severe stenosis at the origin of the left renal artery associated with right renal atrophy, and a soft tissue mass in the upper abdomen 4.4 x 2.3 cm that was suspicious for neoplasm versus enlarged lymph node and a small nodule adjacent to the spleen and 1.8 cm.  A nuclear medicine PET/CT scan was ordered.    Patient has had difficulty with anxiety with the recent medical issues and findings as above as well as issues in the world.  Has had panic attacks at times with short of breath and overwhelmed.  Was given hydroxyzine to use as needed but has not taken after reading about it.  Wakes up at night with anxiety attacks.  Wife states has anger and outbursts at times.    Patient has questions about vitamins and is asking if can take coQ10, b12, b complex.    The following portions of the patient's history were reviewed and updated as appropriate: allergies, current medications, past family history, past medical history, past social history, past surgical history and problem list.    Past Medical History:   Diagnosis Date   • AAA (abdominal aortic aneurysm) ([facility])    • Benign essential hypertension    • COPD (chronic obstructive pulmonary disease) ([facility])    • Coronary artery disease    • Renal cyst     RIGHT   • Smoker     1 PPD   • Stroke ([facility])    • Type 2 diabetes mellitus with circulatory disorder, without  long-term current use of insulin (HCC) 2022       Past Surgical History:   Procedure Laterality Date   • AORTIC VALVE REPAIR/REPLACEMENT     • CARDIAC CATHETERIZATION     • CARDIAC CATHETERIZATION N/A 2016    Procedure: Left Heart Cath;  Surgeon: Irena Hale MD;  Location: [facility] INVASIVE LOCATION;  Service:    • CARDIAC CATHETERIZATION     • CORONARY ANGIOPLASTY     • CORONARY ARTERY BYPASS GRAFT N/A 2017    Procedure: INTRAOPERATIVE THIAGO, MIDLINE STERNOTOMY, CORONARY ARTERY BYPASS GRAFTING X 4 UTILIZING LEFT INTERNAL MAMMARY ARTERY AND RIGHT ENDOSCOPICALLY HARVESTED GREATER SAPHENOUS VEIN, AORTIC VALVE REPLACEMENT;  Surgeon: Jassi De Jesus MD;  Location: [facility] OR;  Service:    • PERIPHERAL ARTERIAL STENT GRAFT         Family History   Problem Relation Age of Onset   • No Known Problems Mother    • No Known Problems Father    • No Known Problems Sister    • No Known Problems Brother    • No Known Problems Maternal Aunt    • No Known Problems Maternal Uncle    • No Known Problems Paternal Aunt    • No Known Problems Paternal Uncle    • No Known Problems Maternal Grandmother    • No Known Problems Maternal Grandfather    • No Known Problems Paternal Grandfather        Social History     Socioeconomic History   • Marital status:    Tobacco Use   • Smoking status: Former Smoker     Packs/day: 1.00     Years: 64.00     Pack years: 64.00     Types: Cigarettes     Quit date: 3/9/2022     Years since quittin.2   • Smokeless tobacco: Never Used   Vaping Use   • Vaping Use: Never used   Substance and Sexual Activity   • Alcohol use: No   • Drug use: No   • Sexual activity: Defer       Current Outpatient Medications   Medication Sig Dispense Refill   • Accu-Chek FastClix Lancets misc USE UTD D     • Accu-Chek Guide test strip USE TO TEST D     • atorvastatin (LIPITOR) 40 MG tablet Take 1 tablet by mouth Daily. 90 tablet 1   • Blood Glucose Monitoring Suppl (Accu-Chek Guide)  w/Device kit FPD     • furosemide (LASIX) 40 MG tablet Take 1 tablet by mouth 2 (Two) Times a Day. 180 tablet 1   • hydrOXYzine (ATARAX) 25 MG tablet Take 1 tablet by mouth 3 (Three) Times a Day As Needed for Anxiety. 30 tablet 3   • metoprolol succinate XL (TOPROL-XL) 25 MG 24 hr tablet Take 1 tablet by mouth Daily. 90 tablet 3   • nicotine (NICODERM CQ) 21 MG/24HR patch Place 1 patch on the skin as directed by provider Daily. 30 patch 1   • nitroglycerin (NITROSTAT) 0.4 MG SL tablet Place 1 tablet under the tongue Every 5 (Five) Minutes As Needed for Chest Pain. Take no more than 3 doses in 15 minutes. 25 tablet 3   • warfarin (COUMADIN) 5 MG tablet Take 5 mg by mouth Daily. Saturday and Sunday     • warfarin (COUMADIN) 7.5 MG tablet Monday through Friday     • escitalopram (Lexapro) 10 MG tablet Take 1 tablet by mouth Daily. 30 tablet 3     No current facility-administered medications for this visit.       Review of Systems   Constitutional: Negative for activity change, appetite change, fatigue, fever, unexpected weight gain and unexpected weight loss.   HENT: Negative for nosebleeds, rhinorrhea, trouble swallowing and voice change.    Eyes: Negative for visual disturbance.   Respiratory: Negative for cough, chest tightness, shortness of breath and wheezing.    Cardiovascular: Negative for chest pain, palpitations and leg swelling.   Gastrointestinal: Negative for abdominal pain, blood in stool, constipation, diarrhea, nausea, vomiting, GERD and indigestion.   Genitourinary: Negative for dysuria, frequency and hematuria.   Musculoskeletal: Negative for arthralgias, back pain and myalgias.   Skin: Negative for rash and wound.   Neurological: Negative for dizziness, tremors, weakness, light-headedness, numbness, headache and memory problem.        Mild left arm/hand/leg weakness but is ambulating.    Hematological: Negative for adenopathy. Does not bruise/bleed easily.   Psychiatric/Behavioral: Negative for sleep  "disturbance and depressed mood. The patient is nervous/anxious.        Objective   /80 (BP Location: Left arm, Patient Position: Sitting, Cuff Size: Adult)   Pulse 78   Temp 98.1 °F (36.7 °C) (Temporal)   Ht 180.3 cm (70.98\")   Wt 91.2 kg (201 lb)   SpO2 98%   BMI 28.05 kg/m²     Physical Exam  Vitals and nursing note reviewed.   Constitutional:       General: He is not in acute distress.     Appearance: He is well-developed. He is obese. He is not diaphoretic.   HENT:      Head: Normocephalic and atraumatic.      Right Ear: External ear normal.      Left Ear: External ear normal.      Nose: Nose normal.   Eyes:      Conjunctiva/sclera: Conjunctivae normal.      Pupils: Pupils are equal, round, and reactive to light.   Neck:      Thyroid: No thyromegaly.      Trachea: No tracheal deviation.   Cardiovascular:      Rate and Rhythm: Normal rate. Rhythm irregular.      Heart sounds: Normal heart sounds. No murmur heard.    No friction rub. No gallop.   Pulmonary:      Effort: Pulmonary effort is normal. No respiratory distress.      Breath sounds: Normal breath sounds.   Abdominal:      General: Bowel sounds are normal.      Palpations: Abdomen is soft. There is no mass.      Tenderness: There is no abdominal tenderness. There is no guarding.   Musculoskeletal:         General: Normal range of motion.      Cervical back: Normal range of motion and neck supple.      Comments: mild left arm/hand/leg weakness 4/5 strength but is ambulating using cane.  Wearing lower extremity compression stockings.   Lymphadenopathy:      Cervical: No cervical adenopathy.   Skin:     General: Skin is warm and dry.      Capillary Refill: Capillary refill takes less than 2 seconds.      Findings: No rash.   Neurological:      Mental Status: He is alert and oriented to person, place, and time.      Motor: No abnormal muscle tone.      Deep Tendon Reflexes: Reflexes normal.   Psychiatric:         Behavior: Behavior normal.         " Thought Content: Thought content normal.         Judgment: Judgment normal.         Recent Results (from the past 2016 hour(s))   Basic Metabolic Panel    Collection Time: 04/05/22 12:31 PM    Specimen: Blood   Result Value Ref Range    Glucose 139 (H) 65 - 99 mg/dL    BUN 20 8 - 27 mg/dL    Creatinine 1.93 (H) 0.76 - 1.27 mg/dL    EGFR Result 36 (L) >59 mL/min/1.73    BUN/Creatinine Ratio 10 10 - 24    Sodium 139 134 - 144 mmol/L    Potassium 3.8 3.5 - 5.2 mmol/L    Chloride 96 96 - 106 mmol/L    Total CO2 24 20 - 29 mmol/L    Calcium 9.7 8.6 - 10.2 mg/dL   CBC & Differential    Collection Time: 04/05/22 12:31 PM    Specimen: Blood   Result Value Ref Range    WBC 10.5 3.4 - 10.8 x10E3/uL    RBC 5.40 4.14 - 5.80 x10E6/uL    Hemoglobin 15.6 13.0 - 17.7 g/dL    Hematocrit 46.5 37.5 - 51.0 %    MCV 86 79 - 97 fL    MCH 28.9 26.6 - 33.0 pg    MCHC 33.5 31.5 - 35.7 g/dL    RDW 13.4 11.6 - 15.4 %    Platelets 230 150 - 450 x10E3/uL    Neutrophil Rel % 79 Not Estab. %    Lymphocyte Rel % 9 Not Estab. %    Monocyte Rel % 8 Not Estab. %    Eosinophil Rel % 3 Not Estab. %    Basophil Rel % 1 Not Estab. %    Neutrophils Absolute 8.3 (H) 1.4 - 7.0 x10E3/uL    Lymphocytes Absolute 1.0 0.7 - 3.1 x10E3/uL    Monocytes Absolute 0.8 0.1 - 0.9 x10E3/uL    Eosinophils Absolute 0.3 0.0 - 0.4 x10E3/uL    Basophils Absolute 0.1 0.0 - 0.2 x10E3/uL    Immature Granulocyte Rel % 0 Not Estab. %    Immature Grans Absolute 0.0 0.0 - 0.1 x10E3/uL   POCT INR    Collection Time: 04/05/22 12:49 PM    Specimen: Blood   Result Value Ref Range    INR 3.8 (A) 0.9 - 1.1   POC INR    Collection Time: 05/12/22 12:00 AM    Specimen: Blood   Result Value Ref Range    INR 2.60 (A) 0.9 - 1.1   POC INR    Collection Time: 05/26/22 12:00 AM    Specimen: Blood   Result Value Ref Range    INR 2.40 (A) 2 - 3   POC Creatinine    Collection Time: 06/10/22  6:31 AM    Specimen: Blood   Result Value Ref Range    Creatinine 1.70 (H) 0.60 - 1.30 mg/dL   POC INR     Collection Time: 06/13/22  1:39 PM    Specimen: Blood   Result Value Ref Range    INR 2.00 2 - 3   CBC (No Diff)    Collection Time: 06/15/22  7:11 AM    Specimen: Blood   Result Value Ref Range    WBC 8.24 3.40 - 10.80 10*3/mm3    RBC 4.98 4.14 - 5.80 10*6/mm3    Hemoglobin 14.3 13.0 - 17.7 g/dL    Hematocrit 43.2 37.5 - 51.0 %    MCV 86.7 79.0 - 97.0 fL    MCH 28.7 26.6 - 33.0 pg    MCHC 33.1 31.5 - 35.7 g/dL    RDW 13.0 12.3 - 15.4 %    RDW-SD 40.7 37.0 - 54.0 fl    MPV 10.2 6.0 - 12.0 fL    Platelets 194 140 - 450 10*3/mm3   Renal Function Panel    Collection Time: 06/15/22  7:11 AM    Specimen: Blood   Result Value Ref Range    Glucose 147 (H) 65 - 99 mg/dL    BUN 14 8 - 23 mg/dL    Creatinine 1.69 (H) 0.76 - 1.27 mg/dL    Sodium 134 (L) 136 - 145 mmol/L    Potassium 4.3 3.5 - 5.2 mmol/L    Chloride 96 (L) 98 - 107 mmol/L    CO2 26.5 22.0 - 29.0 mmol/L    Calcium 9.4 8.6 - 10.5 mg/dL    Albumin 4.20 3.50 - 5.20 g/dL    Phosphorus 3.7 2.5 - 4.5 mg/dL    Anion Gap 11.5 5.0 - 15.0 mmol/L    BUN/Creatinine Ratio 8.3 7.0 - 25.0    eGFR 41.8 (L) >60.0 mL/min/1.73     Assessment & Plan   Diagnoses and all orders for this visit:    1. Anxiety and depression (Primary)  -     escitalopram (Lexapro) 10 MG tablet; Take 1 tablet by mouth Daily.  Dispense: 30 tablet; Refill: 3    2. Panic attacks    3. Abdominal aortic aneurysm (AAA) without rupture (HCC)    4. Renal artery stenosis (HCC)    5. Upper abdominal mass      After long discussion in the room with the patient and his wife for 45 minutes or longer.  We discussed his anxiety which I did wish that him to try his hydroxyzine as needed and we will try the Lexapro daily to see how this does to try and prevent any of his episodes.  We reviewed his scans again.  Recommend to continue the current care.  Will await the PET/CT scan.  Discussed the mood with anxiety, panic attacks, anger outbursts.  Recommend to use the hydroxyzine as needed for panic attacks and recommended  trial of sSRI for the mood.  We discussed the vitamins and he should be ok to take the coQ10, vitamin B12 and B complex.       · COVID-19 Precautions - Patient was compliant in wearing a mask. When I saw the patient, I used appropriate personal protective equipment (PPE) including mask and eye shield (standard procedure).  Additionally, I used gown and gloves if indicated.  Hand hygiene was completed before and after seeing the patient.  · Dictated utilizing Dragon Dictation

## 2022-06-21 NOTE — TELEPHONE ENCOUNTER
Caller: DONAL GUTIERREZ    Relationship to patient: Emergency Contact    Best call back number: [phone]    Patient is needing: PATIENT'S DAUGHTER IS CALLING FOR CLARIFICATION OF THE MEDICATION THAT THE PATIENT WAS PRESCRIBED TODAY.  SHE STATES PATIENT DOES NOT KNOW WHAT IT IS FOR.    PLEASE ADVISE.

## 2022-06-23 ENCOUNTER — CLINICAL SUPPORT (OUTPATIENT)
Dept: FAMILY MEDICINE CLINIC | Facility: CLINIC | Age: 77
End: 2022-06-23

## 2022-06-23 DIAGNOSIS — I71.22 ANEURYSM OF AORTIC ARCH: ICD-10-CM

## 2022-06-23 LAB — INR PPP: 1.9 (ref 0.9–1.1)

## 2022-06-23 PROCEDURE — 36416 COLLJ CAPILLARY BLOOD SPEC: CPT | Performed by: INTERNAL MEDICINE

## 2022-06-23 PROCEDURE — 85610 PROTHROMBIN TIME: CPT | Performed by: INTERNAL MEDICINE

## 2022-06-29 ENCOUNTER — APPOINTMENT (OUTPATIENT)
Dept: PET IMAGING | Facility: HOSPITAL | Age: 77
End: 2022-06-29

## 2022-06-29 ENCOUNTER — HOSPITAL ENCOUNTER (OUTPATIENT)
Dept: PET IMAGING | Facility: HOSPITAL | Age: 77
End: 2022-06-29

## 2022-07-07 ENCOUNTER — TELEPHONE (OUTPATIENT)
Dept: FAMILY MEDICINE CLINIC | Facility: CLINIC | Age: 77
End: 2022-07-07

## 2022-07-07 ENCOUNTER — ANTICOAGULATION VISIT (OUTPATIENT)
Dept: FAMILY MEDICINE CLINIC | Facility: CLINIC | Age: 77
End: 2022-07-07

## 2022-07-07 LAB — INR PPP: 1.9 (ref 2–3)

## 2022-07-07 PROCEDURE — 36416 COLLJ CAPILLARY BLOOD SPEC: CPT | Performed by: INTERNAL MEDICINE

## 2022-07-07 PROCEDURE — 85610 PROTHROMBIN TIME: CPT | Performed by: INTERNAL MEDICINE

## 2022-07-07 NOTE — TELEPHONE ENCOUNTER
I called the patient to reschedule his 8/1 appointment and his daughter, Dalila, had a few questions for Dr. Cleveland about his INR.     - If he is taking his Warfarin the way he is supposed to why is his INR going from a 3 to a 1.9?  - Could it be that he isn't taking it the way he is supposed to?    She also wanted Dr. Cleveland to know that she had scheduled his PET Scan but he canceled it because he didn't want to go. She would like a call back at [phone].

## 2022-07-08 NOTE — TELEPHONE ENCOUNTER
Discussed with daughter at length.  The change in the INR can be related to diet or inconsistancy in taking the medication.  Review the diet and see if there is any high vitamin K containing foods and avoid any vitamins or supplements with vitamin K in it.  The INR can vary even when nothing else is changed and we just simply have to adjust the warfarin as we can.  We discussed the PET scan which I do still recommend as well as following up with cardiovascular surgeon Dr. De Jesus.  He is still reluctant but once again I recommended this.

## 2022-07-12 ENCOUNTER — TELEPHONE (OUTPATIENT)
Dept: CARDIAC SURGERY | Facility: CLINIC | Age: 77
End: 2022-07-12

## 2022-07-18 DIAGNOSIS — I25.10 CHRONIC CORONARY ARTERY DISEASE: Primary | ICD-10-CM

## 2022-07-18 RX ORDER — EZETIMIBE 10 MG/1
10 TABLET ORAL DAILY
Qty: 30 TABLET | Refills: 4 | Status: SHIPPED | OUTPATIENT
Start: 2022-07-18 | End: 2022-12-08

## 2022-07-18 RX ORDER — HYDROXYZINE HYDROCHLORIDE 25 MG/1
1 TABLET, FILM COATED ORAL
COMMUNITY
Start: 2022-05-26 | End: 2022-07-18 | Stop reason: SDUPTHER

## 2022-07-18 RX ORDER — LISINOPRIL 10 MG/1
1 TABLET ORAL
COMMUNITY
Start: 2022-03-24 | End: 2022-09-01

## 2022-07-18 NOTE — TELEPHONE ENCOUNTER
SEE BELOW    Caller: DONAL GUTIERREZ    Relationship: Emergency Contact    Best call back number: [phone]    What medications are you currently taking:   Current Outpatient Medications on File Prior to Visit   Medication Sig Dispense Refill   • Accu-Chek FastClix Lancets misc USE UTD D     • Accu-Chek Guide test strip USE TO TEST D     • atorvastatin (LIPITOR) 40 MG tablet Take 1 tablet by mouth Daily. 90 tablet 1   • Blood Glucose Monitoring Suppl (Accu-Chek Guide) w/Device kit FPD     • escitalopram (Lexapro) 10 MG tablet Take 1 tablet by mouth Daily. 30 tablet 3   • furosemide (LASIX) 40 MG tablet Take 1 tablet by mouth 2 (Two) Times a Day. 180 tablet 1   • hydrOXYzine (ATARAX) 25 MG tablet Take 1 tablet by mouth 3 (Three) Times a Day As Needed for Anxiety. 30 tablet 3   • metoprolol succinate XL (TOPROL-XL) 25 MG 24 hr tablet Take 1 tablet by mouth Daily. 90 tablet 3   • nicotine (NICODERM CQ) 21 MG/24HR patch Place 1 patch on the skin as directed by provider Daily. 30 patch 1   • nitroglycerin (NITROSTAT) 0.4 MG SL tablet Place 1 tablet under the tongue Every 5 (Five) Minutes As Needed for Chest Pain. Take no more than 3 doses in 15 minutes. 25 tablet 3   • warfarin (COUMADIN) 5 MG tablet Take 5 mg by mouth Daily. Saturday and Sunday     • warfarin (COUMADIN) 7.5 MG tablet Monday through Friday       No current facility-administered medications on file prior to visit.      Which medication are you concerned about:atorvastatin (LIPITOR) 40 MG tablet    What are your concerns: DAUGHTER CALLED REGARDING PATIENT.  PATIENT DOES NOT WANT TO TAKE THE ATORVASTATIN. MEDICATION IS CAUSING SOME CRAMPING IN HIS LEGS.  SUGGESTED IF ZETIA CAN BE AN ALTERNATE    PHARMACY: [id number] - [place], [place] - [address] AT [facility] [phone] [facility] [phone] FX

## 2022-07-18 NOTE — TELEPHONE ENCOUNTER
We can definitely try and change him from the statin atorvastatin to the nonstatin Zetia.  This is not as strong as the other medicine but we can definitely do that and a prescription has been sent to the pharmacy.  If his cholesterol is not in proved significantly the cardiologist may consider an alternative that we are not able to prescribe that is an injectable medication that does not usually have the muscle problems.

## 2022-07-20 NOTE — TELEPHONE ENCOUNTER
Caller: DONAL GUTIERREZ    Relationship: Emergency Contact    Best call back number: [phone]    What medications are you currently taking:   Current Outpatient Medications on File Prior to Visit   Medication Sig Dispense Refill   • Accu-Chek FastClix Lancets misc USE UTD D     • Accu-Chek Guide test strip USE TO TEST D     • Blood Glucose Monitoring Suppl (Accu-Chek Guide) w/Device kit FPD     • escitalopram (Lexapro) 10 MG tablet Take 1 tablet by mouth Daily. 30 tablet 3   • ezetimibe (Zetia) 10 MG tablet Take 1 tablet by mouth Daily. 30 tablet 4   • furosemide (LASIX) 40 MG tablet Take 1 tablet by mouth 2 (Two) Times a Day. 180 tablet 1   • hydrOXYzine (ATARAX) 25 MG tablet Take 1 tablet by mouth 3 (Three) Times a Day As Needed for Anxiety. 30 tablet 3   • lisinopril (PRINIVIL,ZESTRIL) 10 MG tablet Take 1 tablet by mouth.     • metoprolol succinate XL (TOPROL-XL) 25 MG 24 hr tablet Take 1 tablet by mouth Daily. 90 tablet 3   • nicotine (NICODERM CQ) 21 MG/24HR patch Place 1 patch on the skin as directed by provider Daily. 30 patch 1   • nitroglycerin (NITROSTAT) 0.4 MG SL tablet Place 1 tablet under the tongue Every 5 (Five) Minutes As Needed for Chest Pain. Take no more than 3 doses in 15 minutes. 25 tablet 3   • warfarin (COUMADIN) 5 MG tablet Take 5 mg by mouth Daily. Saturday and Sunday     • warfarin (COUMADIN) 7.5 MG tablet Monday through Friday       No current facility-administered medications on file prior to visit.        Which medication are you concerned about:furosemide (LASIX) 40 MG tablet     Who prescribed you this medication: DR MARTIN    What are your concerns: PATIENT'S DAUGHTER STATES THEY HAD DISCUSSED REDUCING THE DOSAGE OF HIS furosemide (LASIX) 40 MG tablet FROM 80MG TO 40MG, BUT THEY NOTICED THE DIRECTIONS NOW ADVISED TAKING IT TWICE DAILY. THEY REQUEST A CALL BACK TO CONFIRM IF THIS IS CORRECT, SINCE THIS WOULD STILL BE 80MG PER DAY, OR IF THEY NEED TO ADJUST THE DIRECTIONS.

## 2022-07-21 ENCOUNTER — CLINICAL SUPPORT (OUTPATIENT)
Dept: FAMILY MEDICINE CLINIC | Facility: CLINIC | Age: 77
End: 2022-07-21

## 2022-07-21 DIAGNOSIS — Z79.01 CHRONIC ANTICOAGULATION: Primary | ICD-10-CM

## 2022-07-21 LAB — INR PPP: 2.1 (ref 2–3)

## 2022-07-21 PROCEDURE — 36416 COLLJ CAPILLARY BLOOD SPEC: CPT | Performed by: INTERNAL MEDICINE

## 2022-07-21 PROCEDURE — 85610 PROTHROMBIN TIME: CPT | Performed by: INTERNAL MEDICINE

## 2022-07-21 NOTE — TELEPHONE ENCOUNTER
No he can go back to the 40 mg once a day but he has the extra pills in case he has a return of swelling or difficulties.

## 2022-08-01 ENCOUNTER — TELEPHONE (OUTPATIENT)
Dept: FAMILY MEDICINE CLINIC | Facility: CLINIC | Age: 77
End: 2022-08-01

## 2022-08-01 DIAGNOSIS — Z79.01 CHRONIC ANTICOAGULATION: ICD-10-CM

## 2022-08-01 DIAGNOSIS — Z79.01 CHRONIC ANTICOAGULATION: Primary | ICD-10-CM

## 2022-08-01 NOTE — TELEPHONE ENCOUNTER
Pt has an appt on 8/3 for a pet scan with ([facility]) DR. Calin Carlson [phone] (phone). They would like to have orders for pt INR. That way he does not have to leave home back to back days.

## 2022-08-03 ENCOUNTER — HOSPITAL ENCOUNTER (OUTPATIENT)
Dept: PET IMAGING | Facility: HOSPITAL | Age: 77
End: 2022-08-03

## 2022-08-03 ENCOUNTER — ANTICOAGULATION VISIT (OUTPATIENT)
Dept: FAMILY MEDICINE CLINIC | Facility: CLINIC | Age: 77
End: 2022-08-03

## 2022-08-03 ENCOUNTER — HOSPITAL ENCOUNTER (OUTPATIENT)
Dept: PET IMAGING | Facility: HOSPITAL | Age: 77
Discharge: HOME | End: 2022-08-03

## 2022-08-03 ENCOUNTER — LAB (OUTPATIENT)
Dept: LAB | Facility: HOSPITAL | Age: 77
End: 2022-08-03

## 2022-08-03 DIAGNOSIS — Z79.01 CHRONIC ANTICOAGULATION: Primary | ICD-10-CM

## 2022-08-03 DIAGNOSIS — R93.5 ABNORMAL CT OF THE ABDOMEN: ICD-10-CM

## 2022-08-03 DIAGNOSIS — R19.09 ABDOMINAL MASS OF OTHER SITE: ICD-10-CM

## 2022-08-03 DIAGNOSIS — I63.411 CEREBROVASCULAR ACCIDENT (CVA) DUE TO EMBOLISM OF RIGHT MIDDLE CEREBRAL ARTERY: Primary | ICD-10-CM

## 2022-08-03 DIAGNOSIS — Z79.01 CHRONIC ANTICOAGULATION: ICD-10-CM

## 2022-08-03 LAB
INR PPP: 1.67 (ref 0.9–1.1)
PROTHROMBIN TIME: 19.3 SECONDS (ref 11.7–14.2)

## 2022-08-03 PROCEDURE — 36415 COLL VENOUS BLD VENIPUNCTURE: CPT

## 2022-08-03 PROCEDURE — 85610 PROTHROMBIN TIME: CPT | Performed by: FAMILY MEDICINE

## 2022-08-03 RX ORDER — WARFARIN SODIUM 1 MG/1
1 TABLET ORAL DAILY
Qty: 30 TABLET | Refills: 1 | Status: SHIPPED | OUTPATIENT
Start: 2022-08-03

## 2022-08-03 RX ORDER — WARFARIN SODIUM 1 MG/1
1 TABLET ORAL DAILY
Qty: 30 TABLET | Refills: 1 | Status: SHIPPED | OUTPATIENT
Start: 2022-08-03 | End: 2022-08-03

## 2022-08-03 NOTE — TELEPHONE ENCOUNTER
Spoke with patient daughter to give INR results.    Patient is going to take 1 extra MG daily to his current regimen until Monday 8/8 to have INR rechecked. Patient is scheduled for 8/8     Patients daughter also wanted to make Dr. Cleveland aware that patient went to [facility] for PET scan and had a panic attack and left.

## 2022-08-08 ENCOUNTER — CLINICAL SUPPORT (OUTPATIENT)
Dept: FAMILY MEDICINE CLINIC | Facility: CLINIC | Age: 77
End: 2022-08-08

## 2022-08-08 DIAGNOSIS — I63.411 CEREBROVASCULAR ACCIDENT (CVA) DUE TO EMBOLISM OF RIGHT MIDDLE CEREBRAL ARTERY: Primary | ICD-10-CM

## 2022-08-08 LAB — INR PPP: 2.6 (ref 2–3)

## 2022-08-08 PROCEDURE — 36416 COLLJ CAPILLARY BLOOD SPEC: CPT | Performed by: INTERNAL MEDICINE

## 2022-08-08 PROCEDURE — 85610 PROTHROMBIN TIME: CPT | Performed by: INTERNAL MEDICINE

## 2022-08-15 ENCOUNTER — TRANSCRIBE ORDERS (OUTPATIENT)
Dept: ADMINISTRATIVE | Facility: HOSPITAL | Age: 77
End: 2022-08-15

## 2022-08-15 DIAGNOSIS — I73.9 PAD (PERIPHERAL ARTERY DISEASE): Primary | ICD-10-CM

## 2022-08-22 ENCOUNTER — ANTICOAGULATION VISIT (OUTPATIENT)
Dept: FAMILY MEDICINE CLINIC | Facility: CLINIC | Age: 77
End: 2022-08-22

## 2022-08-22 DIAGNOSIS — I63.411 CEREBROVASCULAR ACCIDENT (CVA) DUE TO EMBOLISM OF RIGHT MIDDLE CEREBRAL ARTERY: Primary | ICD-10-CM

## 2022-08-22 LAB — INR PPP: 1.6 (ref 2–3)

## 2022-08-22 PROCEDURE — 85610 PROTHROMBIN TIME: CPT | Performed by: INTERNAL MEDICINE

## 2022-08-22 PROCEDURE — 36416 COLLJ CAPILLARY BLOOD SPEC: CPT | Performed by: INTERNAL MEDICINE

## 2022-08-23 DIAGNOSIS — I63.411 CEREBROVASCULAR ACCIDENT (CVA) DUE TO EMBOLISM OF RIGHT MIDDLE CEREBRAL ARTERY: Primary | ICD-10-CM

## 2022-08-23 NOTE — PROGRESS NOTES
Patient came in for INR check. Today 8/22/22 INR was 1.6  Patients wife states one 8/8/22 patient INR was 2.6 and patient went back to taking 5 MG everyday. Original dosing should be 5 MG everyday except Monday and Friday to take 7.5 MG. Patient was told to take an extra half of the 7.5 tab 8/22 and an extra half of the 5 mg tab today 8/23/22 we will then recheck on Thursday Sept. 1st

## 2022-08-25 DIAGNOSIS — I63.411 CEREBROVASCULAR ACCIDENT (CVA) DUE TO EMBOLISM OF RIGHT MIDDLE CEREBRAL ARTERY: Primary | ICD-10-CM

## 2022-08-26 RX ORDER — NITROGLYCERIN 0.4 MG/1
0.4 TABLET SUBLINGUAL
Qty: 25 TABLET | Refills: 0 | Status: SHIPPED | OUTPATIENT
Start: 2022-08-26

## 2022-09-01 ENCOUNTER — OFFICE VISIT (OUTPATIENT)
Dept: FAMILY MEDICINE CLINIC | Facility: CLINIC | Age: 77
End: 2022-09-01

## 2022-09-01 VITALS
DIASTOLIC BLOOD PRESSURE: 82 MMHG | SYSTOLIC BLOOD PRESSURE: 138 MMHG | BODY MASS INDEX: 27.07 KG/M2 | HEART RATE: 77 BPM | OXYGEN SATURATION: 98 % | TEMPERATURE: 98.2 F | WEIGHT: 194 LBS

## 2022-09-01 DIAGNOSIS — Z79.01 CHRONIC ANTICOAGULATION: ICD-10-CM

## 2022-09-01 DIAGNOSIS — I70.1 RENAL ARTERY STENOSIS: ICD-10-CM

## 2022-09-01 DIAGNOSIS — I10 ESSENTIAL HYPERTENSION: ICD-10-CM

## 2022-09-01 DIAGNOSIS — I48.0 PAROXYSMAL ATRIAL FIBRILLATION: ICD-10-CM

## 2022-09-01 DIAGNOSIS — I63.411 CEREBROVASCULAR ACCIDENT (CVA) DUE TO EMBOLISM OF RIGHT MIDDLE CEREBRAL ARTERY: ICD-10-CM

## 2022-09-01 DIAGNOSIS — F17.210 CIGARETTE NICOTINE DEPENDENCE WITHOUT COMPLICATION: ICD-10-CM

## 2022-09-01 DIAGNOSIS — E11.59 TYPE 2 DIABETES MELLITUS WITH OTHER CIRCULATORY COMPLICATION, WITHOUT LONG-TERM CURRENT USE OF INSULIN: ICD-10-CM

## 2022-09-01 DIAGNOSIS — Z00.00 MEDICARE ANNUAL WELLNESS VISIT, SUBSEQUENT: Primary | ICD-10-CM

## 2022-09-01 DIAGNOSIS — N18.30 STAGE 3 CHRONIC KIDNEY DISEASE, UNSPECIFIED WHETHER STAGE 3A OR 3B CKD: ICD-10-CM

## 2022-09-01 DIAGNOSIS — I71.40 ABDOMINAL AORTIC ANEURYSM (AAA) WITHOUT RUPTURE: ICD-10-CM

## 2022-09-01 DIAGNOSIS — I25.5 ISCHEMIC CARDIOMYOPATHY: ICD-10-CM

## 2022-09-01 LAB — INR PPP: 2.3 (ref 2–3)

## 2022-09-01 PROCEDURE — 85610 PROTHROMBIN TIME: CPT | Performed by: INTERNAL MEDICINE

## 2022-09-01 PROCEDURE — 99214 OFFICE O/P EST MOD 30 MIN: CPT | Performed by: INTERNAL MEDICINE

## 2022-09-01 PROCEDURE — 1159F MED LIST DOCD IN RCRD: CPT | Performed by: INTERNAL MEDICINE

## 2022-09-01 PROCEDURE — 1170F FXNL STATUS ASSESSED: CPT | Performed by: INTERNAL MEDICINE

## 2022-09-01 PROCEDURE — 36416 COLLJ CAPILLARY BLOOD SPEC: CPT | Performed by: INTERNAL MEDICINE

## 2022-09-01 RX ORDER — LANCETS
1 EACH MISCELLANEOUS 4 TIMES DAILY
Qty: 102 EACH | Refills: 4 | Status: SHIPPED | OUTPATIENT
Start: 2022-09-01

## 2022-09-01 RX ORDER — WARFARIN SODIUM 7.5 MG/1
7.5 TABLET ORAL TAKE AS DIRECTED
Qty: 30 TABLET | Refills: 1 | Status: SHIPPED | OUTPATIENT
Start: 2022-09-01 | End: 2022-09-06

## 2022-09-01 NOTE — PATIENT INSTRUCTIONS
Medicare Wellness  Personal Prevention Plan of Service     Date of Office Visit:    Encounter Provider:  Triston Cleveland MD  Place of Service:  [facility]  Patient Name: Conor Faustin  :  1945    As part of the Medicare Wellness portion of your visit today, we are providing you with this personalized preventive plan of services (PPPS). This plan is based upon recommendations of the [place] Preventive Services Task Force (USPSTF) and the Advisory Committee on Immunization Practices (ACIP).    This lists the preventive care services that should be considered, and provides dates of when you are due. Items listed as completed are up-to-date and do not require any further intervention.    Health Maintenance   Topic Date Due    URINE MICROALBUMIN  Never done    COVID-19 Vaccine (1) Never done    Pneumococcal Vaccine 65+ (1 - PCV) Never done    TDAP/TD VACCINES (1 - Tdap) Never done    ZOSTER VACCINE (1 of 2) Never done    HEPATITIS C SCREENING  Never done    DIABETIC EYE EXAM  2019    HEMOGLOBIN A1C  2022    INFLUENZA VACCINE  10/01/2022    ANNUAL WELLNESS VISIT  2023    LUNG CANCER SCREENING  Discontinued    COLORECTAL CANCER SCREENING  Discontinued       Orders Placed This Encounter   Procedures    Comprehensive Metabolic Panel     Order Specific Question:   Release to patient     Answer:   Routine Release    Lipid Panel    Microalbumin / Creatinine Urine Ratio - Urine, Clean Catch     Order Specific Question:   Release to patient     Answer:   Routine Release    Hemoglobin A1c     Order Specific Question:   Release to patient     Answer:   Routine Release    Ambulatory Referral to Vascular Surgery     Referral Priority:   Routine     Referral Type:   Consultation     Referral Reason:   Specialty Services Required     Referred to Provider:   Hunter Razo MD     Requested Specialty:   Vascular Surgery     Number of Visits Requested:   1     POC INR     This is an external result entered through the Results Console.     Order Specific Question:   Release to patient     Answer:   Routine Release    CBC & Differential     Order Specific Question:   Manual Differential     Answer:   No       Return in about 3 months (around 12/1/2022) for Next scheduled follow up.

## 2022-09-01 NOTE — PROGRESS NOTES
Subsequent Medicare Wellness Visit   The ABC's of the Annual Wellness Visit    Chief Complaint   Patient presents with   • Medicare Wellness-subsequent   • Anticoagulation     INR       HPI:  Conor Faustin, -1945, is a 77 y.o. male who presents for a Subsequent Medicare Wellness Visit.    Wife and daughter was present during the history-taking and subsequent discussion (and for part of the physical exam) with this patient.  Patient agrees to the presence of the individual during this visit.    Here for follow up INR evaluation.  Currently anticoagulated with warfarin for atrial fib and s/p CVA.  The patient is taking warfarin 5 mg every day except for Monday and Friday 7.5 mg.  The current INR goal is 2-3.  The INR is currently 2.3.  No significant interval events, easy bleeding, bruising, epistaxis, fever, weakness or numbness.      Patient has had difficulty with anxiety with the recent medical issues and findings as above as well as issues in the world.  Has had panic attacks at times with short of breath and overwhelmed.  Was given hydroxyzine to use as needed but has not taken after reading about it.  Wakes up at night with anxiety attacks.  Wife states has anger and outbursts at times.    CT abdomen on 6/10/2022 demonstrated soft tissue mass in the upper abdomen near the lesser curvature of the stomach and posterior to the pancreas and splenic vasculature and a nodule adjacent to the spleen.  PET scan has been ordered but is pending.  Has known aortic aneurysm infrarenal 5.2 cm with ectasia of the proximal right common iliac artery and minimal bilateral internal iliac artery aneurysm with severe stenosis at the origin of the left renal artery associated with renal atrophy.  Patient went to Dr Carlson of vascular and evaluated.  Told that he has no options at this time either surgical or stent.  Family ask for second opinion.    Recent Hospitalizations:  Recently treated at the following:  [facility]  [facility].    Current Medical Providers:  Patient Care Team:  Triston Cleveland MD as PCP - General (Internal Medicine)  Jassi De Jesus MD as Surgeon (Cardiothoracic Surgery)  Bg Verdin III, MD as Consulting Physician (Cardiology)    Health Habits and Functional and Cognitive Screening and Depression Screening:  Functional & Cognitive Status 9/1/2022   Do you have difficulty preparing food and eating? No   Do you have difficulty bathing yourself, getting dressed or grooming yourself? Yes   Do you have difficulty using the toilet? No   Do you have difficulty moving around from place to place? No   Do you have trouble with steps or getting out of a bed or a chair? No   Current Diet Well Balanced Diet   Dental Exam Not up to date   Eye Exam Not up to date   Exercise (times per week) 0 times per week   Current Exercises Include No Regular Exercise   Do you need help using the phone?  No   Are you deaf or do you have serious difficulty hearing?  No   Do you need help with transportation? Yes   Do you need help shopping? No   Do you need help preparing meals?  No   Do you need help with housework?  No   Do you need help with laundry? No   Do you need help taking your medications? No   Do you need help managing money? No   Do you ever drive or ride in a car without wearing a seat belt? No   Have you felt unusual stress, anger or loneliness in the last month? Yes   Who do you live with? Spouse   If you need help, do you have trouble finding someone available to you? No   Do you have difficulty concentrating, remembering or making decisions? No       Compared to one year ago, the patient feels his physical health is worse and his mental health is worse.    Depression Screen:  No flowsheet data found.    Falls Risk Assessment:  JOIE Fall Risk Clinician Key Questions   Have you fallen in the past year?: No  Do you feel unsteady with walking?: Yes  Are you worried about falling?: No      Past  "Medical/Family/Social History:  The following portions of the patient's history were reviewed and updated as appropriate: allergies, current medications, past family history, past medical history, past social history, past surgical history and problem list.    Allergies   Allergen Reactions   • Bisoprolol Swelling and Angioedema     Other reaction(s): Angioedema   • Atorvastatin Myalgia   • Clarithromycin Swelling and Rash     \"biaxin\"         Current Outpatient Medications:   •  Accu-Chek FastClix Lancets misc, USE UTD D, Disp: , Rfl:   •  Accu-Chek Guide test strip, USE TO TEST D, Disp: , Rfl:   •  Blood Glucose Monitoring Suppl (Accu-Chek Guide) w/Device kit, FPD, Disp: , Rfl:   •  ezetimibe (Zetia) 10 MG tablet, Take 1 tablet by mouth Daily., Disp: 30 tablet, Rfl: 4  •  furosemide (LASIX) 40 MG tablet, Take 1 tablet by mouth 2 (Two) Times a Day. (Patient taking differently: Take 40 mg by mouth Daily. Every morning), Disp: 180 tablet, Rfl: 1  •  metoprolol succinate XL (TOPROL-XL) 25 MG 24 hr tablet, Take 1 tablet by mouth Daily., Disp: 90 tablet, Rfl: 3  •  nitroglycerin (NITROSTAT) 0.4 MG SL tablet, Place 1 tablet under the tongue Every 5 (Five) Minutes As Needed for Chest Pain. Take no more than 3 doses in 15 minutes., Disp: 25 tablet, Rfl: 0  •  warfarin (COUMADIN) 5 MG tablet, Take 5 mg by mouth Daily. Su/Tu/W/Th/Sat, Disp: , Rfl:   •  warfarin (COUMADIN) 7.5 MG tablet, Monday and Friday, Disp: , Rfl:   •  warfarin (COUMADIN) 1 MG tablet, TAKE 1 TABLET BY MOUTH DAILY. ADD TO CURRENT THERAPY, Disp: 30 tablet, Rfl: 1    Aspirin use counseling: Does not need ASA (and currently is not on it)    Current medication list contains no high risk medications.  No harmful drug interactions have been identified.     Family History   Problem Relation Age of Onset   • No Known Problems Mother    • No Known Problems Father    • No Known Problems Sister    • No Known Problems Brother    • No Known Problems Maternal Aunt    • " No Known Problems Maternal Uncle    • No Known Problems Paternal Aunt    • No Known Problems Paternal Uncle    • No Known Problems Maternal Grandmother    • No Known Problems Maternal Grandfather    • No Known Problems Paternal Grandfather        Social History     Tobacco Use   • Smoking status: Former Smoker     Packs/day: 1.00     Years: 64.00     Pack years: 64.00     Types: Cigarettes     Quit date: 3/9/2022     Years since quittin.4   • Smokeless tobacco: Never Used   Substance Use Topics   • Alcohol use: No       Past Surgical History:   Procedure Laterality Date   • AORTIC VALVE REPAIR/REPLACEMENT     • CARDIAC CATHETERIZATION     • CARDIAC CATHETERIZATION N/A 2016    Procedure: Left Heart Cath;  Surgeon: Irena Hale MD;  Location: [facility] INVASIVE LOCATION;  Service:    • CARDIAC CATHETERIZATION     • CORONARY ANGIOPLASTY     • CORONARY ARTERY BYPASS GRAFT N/A 2017    Procedure: INTRAOPERATIVE THIAGO, MIDLINE STERNOTOMY, CORONARY ARTERY BYPASS GRAFTING X 4 UTILIZING LEFT INTERNAL MAMMARY ARTERY AND RIGHT ENDOSCOPICALLY HARVESTED GREATER SAPHENOUS VEIN, AORTIC VALVE REPLACEMENT;  Surgeon: Jassi De Jesus MD;  Location: [facility] OR;  Service:    • PERIPHERAL ARTERIAL STENT GRAFT         Patient Active Problem List   Diagnosis   • Aneurysm of aortic arch (HCC)   • Essential hypertension   • Renal cyst   • Acute posthemorrhagic anemia   • Chronic coronary artery disease   • Abnormal result of cardiovascular function study   • Cholelithiasis   • Chronic obstructive bronchitis ([facility])   • Current smoker   • AAA (abdominal aortic aneurysm) ([facility])   • Angina of effort ([facility])   • Coronary artery disease involving native coronary artery of native heart   • S/P CABG (coronary artery bypass graft)   • Tobacco abuse   • Leg swelling   • S/P AVR   • Atherosclerotic cerebrovascular disease   • Stage 3 chronic kidney disease ([facility])   • Severe aortic stenosis   • Non-ischemic myocardial injury  (non-traumatic)   • Paroxysmal atrial fibrillation (HCC)   • Ischemic cardiomyopathy   • CVA (cerebral vascular accident) (HCC)   • Acute ischemic right MCA stroke (HCC)   • Elevated troponin   • Type 2 diabetes mellitus with circulatory disorder, without long-term current use of insulin (HCC)   • Chronic anticoagulation   • Anxiety and depression   • Panic attacks   • Renal artery stenosis (HCC)   • Upper abdominal mass   • Cigarette nicotine dependence without complication       Review of Systems   Constitutional: Negative for activity change, appetite change, fatigue, fever, unexpected weight gain and unexpected weight loss.   HENT: Negative for nosebleeds, rhinorrhea, trouble swallowing and voice change.    Eyes: Negative for visual disturbance.   Respiratory: Negative for cough, chest tightness, shortness of breath and wheezing.    Cardiovascular: Negative for chest pain, palpitations and leg swelling.   Gastrointestinal: Negative for abdominal pain, blood in stool, constipation, diarrhea, nausea, vomiting, GERD and indigestion.        Intermittent episodes of abdominal pain mid abdomen and right upper.   Genitourinary: Negative for dysuria, frequency and hematuria.   Musculoskeletal: Negative for arthralgias, back pain and myalgias.        Weakness in the left arm and leg but is still able to ambulate.   Skin: Negative for rash and wound.   Neurological: Negative for dizziness, tremors, weakness, light-headedness, numbness, headache and memory problem.   Hematological: Negative for adenopathy. Does not bruise/bleed easily.   Psychiatric/Behavioral: Negative for sleep disturbance and depressed mood. The patient is nervous/anxious.        Objective     Vitals:    09/01/22 1610   BP: 138/82   Pulse: 77   Temp: 98.2 °F (36.8 °C)   TempSrc: Infrared   SpO2: 98%   Weight: 88 kg (194 lb)   PainSc: 0-No pain       BMI is >= 25 and <30. (Overweight) The following options were offered after discussion;: none (medical  contraindication)      No exam data present    Patient has history of stroke and some cognitive deficits but refuses to do any testing and does Cristofer subject when trying to do the questioning.    Physical Exam  Vitals and nursing note reviewed.   Constitutional:       General: He is not in acute distress.     Appearance: He is well-developed. He is not diaphoretic.   HENT:      Head: Normocephalic and atraumatic.      Right Ear: External ear normal.      Left Ear: External ear normal.      Nose: Nose normal.   Eyes:      Conjunctiva/sclera: Conjunctivae normal.      Pupils: Pupils are equal, round, and reactive to light.   Neck:      Thyroid: No thyromegaly.      Trachea: No tracheal deviation.   Cardiovascular:      Rate and Rhythm: Normal rate. Rhythm irregular.      Heart sounds: Normal heart sounds. No murmur heard.    No friction rub. No gallop.   Pulmonary:      Effort: Pulmonary effort is normal. No respiratory distress.      Breath sounds: Normal breath sounds.   Abdominal:      General: Bowel sounds are normal.      Palpations: Abdomen is soft. There is no mass.      Tenderness: There is no abdominal tenderness. There is no guarding.   Musculoskeletal:         General: Normal range of motion.      Cervical back: Normal range of motion and neck supple.      Comments: Left arm hand weakness 4 out of 5 and left leg weakness 4+ out of 5.  He is utilizing a cane for walking but ambulating quickly.  Is wearing bilateral lower compression stockings.  Noted some swelling through the stockings.   Lymphadenopathy:      Cervical: No cervical adenopathy.   Skin:     General: Skin is warm and dry.      Capillary Refill: Capillary refill takes less than 2 seconds.      Findings: No rash.   Neurological:      Mental Status: He is alert and oriented to person, place, and time.      Motor: No abnormal muscle tone.      Deep Tendon Reflexes: Reflexes normal.   Psychiatric:         Behavior: Behavior normal.         Thought  Content: Thought content normal.         Judgment: Judgment normal.         Recent Lab Results:  Lab Results   Component Value Date     (H) 03/01/2022     Lab Results   Component Value Date    CHOL 159 03/13/2017    TRIG 173 (H) 08/24/2020    HDL 40 08/24/2020    VLDL 35 08/24/2020    LDLHDL 2.05 03/13/2017       Assessment & Plan   Age-appropriate Screening Schedule:  Refer to the list below for future screening recommendations based on patient's age, sex and/or medical conditions.      Health Maintenance   Topic Date Due   • URINE MICROALBUMIN  Never done   • TDAP/TD VACCINES (1 - Tdap) Never done   • ZOSTER VACCINE (1 of 2) Never done   • DIABETIC EYE EXAM  02/16/2019   • HEMOGLOBIN A1C  09/12/2022   • INFLUENZA VACCINE  10/01/2022       Medicare Risks and Personalized Health Plan:  Advance Directive Discussion  Fall Risk  Glaucoma Risk  Immunizations Discussed/Encouraged (specific immunizations; Tdap, Prevnar 20 (Pneumococcal 20-valent conjugate), Shingrix and COVID19 )      CMS-Preventive Services Quick Reference  Medicare Preventive Services Addressed:  Annual Wellness Visit (AWV)  Glaucoma screening (for individuals with diabetes mellitus, family history of glaucoma, -Americans (> or =) age 50, -Americans (> or =) age 65)    Advance Care Planning:  ACP discussion was held with the patient during this visit. Patient does not have an advance directive, information provided.    Diagnoses and all orders for this visit:    1. Medicare annual wellness visit, subsequent (Primary)  -     POC INR    2. Essential hypertension  -     Comprehensive Metabolic Panel  -     Lipid Panel  -     POC INR    3. Type 2 diabetes mellitus with other circulatory complication, without long-term current use of insulin (HCC)  -     Comprehensive Metabolic Panel  -     Lipid Panel  -     Microalbumin / Creatinine Urine Ratio - Urine, Clean Catch  -     Hemoglobin A1c  -     POC INR    4. Stage 3 chronic kidney  disease, unspecified whether stage 3a or 3b CKD (HCC)  -     Comprehensive Metabolic Panel  -     POC INR    5. Ischemic cardiomyopathy  -     POC INR    6. Chronic anticoagulation  -     CBC & Differential  -     POC INR    7. Cerebrovascular accident (CVA) due to embolism of right middle cerebral artery (HCC)    8. Paroxysmal atrial fibrillation (HCC)    9. Abdominal aortic aneurysm (AAA) without rupture (HCC)    10. Renal artery stenosis (HCC)    11. Cigarette nicotine dependence without complication      Hypertension is currently controlled.  Continue current medications unchanged.  Chronic anticoagulation which is within the appropriate range.  Continue the warfarin 5 mg every day except for Monday and Friday 7.5 mg.  Recheck every 3 to 4 weeks.  Patient chronic kidney disease we will continue to check and monitor the function with the labs as noted above.  CVA with chronic left-sided mild hemiparesis will observe and continue the current medications.  Type 2 diabetes we will check A1c monitoring and adjust based upon results.  Reviewed and discussed with the family and the patient concerning abdominal aortic aneurysm and the renal artery stenosis with vascular disease for which the vascular surgeon Dr. Calin Carlson was not wanting to do any further procedures secondary to the patient's history and difficulties.  The family is asking for a second opinion for which we will obtain and referrals been entered.  Discussed the soft tissue mass in the abdomen that is concerning and may even be a cancer.  I discussed this at length and recommend a PET scan which we will try to get at [facility] per patient's request.  If it is positive we will likely need to have a biopsy once again questioned arises about patient's ability to have any kind of testing done thereafter.  I did discuss with the family and the patient the importance of stopping smoking with his multiple medical problems and vascular disease strokes  atrial fibrillation etc. but he continues to do that they will try the nicotine patch again that they have at home but I believe the patient will be noncompliant.  Conor Faustin  reports that he quit smoking about 5 months ago. His smoking use included cigarettes. He has a 64.00 pack-year smoking history. He has never used smokeless tobacco.. I have educated him on the risk of diseases from using tobacco products such as cancer, COPD, heart disease and cataracts.   I advised him to quit and he is willing to quit. We have discussed the following method/s for tobacco cessation:  Counseling OTC Cessation Products.  Together we have set a quit date for 2 weeks from today.  He will follow up with me in 3 months or sooner to check on his progress.  I spent 4 minutes counseling the patient.    An After Visit Summary and PPPS with all of these plans were given to the patient.      Follow Up:  No follow-ups on file.           · COVID-19 Precautions - Patient was compliant in wearing a mask. When I saw the patient, I used appropriate personal protective equipment (PPE) including mask and eye shield (standard procedure).  Additionally, I used gown and gloves if indicated.  Hand hygiene was completed before and after seeing the patient.  · Dictated utilizing Dragon Dictation

## 2022-09-02 ENCOUNTER — TELEPHONE (OUTPATIENT)
Dept: CARDIOLOGY | Facility: CLINIC | Age: 77
End: 2022-09-02

## 2022-09-02 LAB
ALBUMIN SERPL-MCNC: 4.1 G/DL (ref 3.7–4.7)
ALBUMIN/CREAT UR: 40 MG/G CREAT (ref 0–29)
ALBUMIN/GLOB SERPL: 1.6 {RATIO} (ref 1.2–2.2)
ALP SERPL-CCNC: 96 IU/L (ref 44–121)
ALT SERPL-CCNC: 16 IU/L (ref 0–44)
AST SERPL-CCNC: 22 IU/L (ref 0–40)
BASOPHILS # BLD AUTO: 0.1 X10E3/UL (ref 0–0.2)
BASOPHILS NFR BLD AUTO: 1 %
BILIRUB SERPL-MCNC: 0.5 MG/DL (ref 0–1.2)
BUN SERPL-MCNC: 15 MG/DL (ref 8–27)
BUN/CREAT SERPL: 8 (ref 10–24)
CALCIUM SERPL-MCNC: 9.3 MG/DL (ref 8.6–10.2)
CHLORIDE SERPL-SCNC: 98 MMOL/L (ref 96–106)
CHOLEST SERPL-MCNC: 123 MG/DL (ref 100–199)
CO2 SERPL-SCNC: 27 MMOL/L (ref 20–29)
CREAT SERPL-MCNC: 1.83 MG/DL (ref 0.76–1.27)
CREAT UR-MCNC: 33.5 MG/DL
EGFRCR-CYS SERPLBLD CKD-EPI 2021: 38 ML/MIN/1.73
EOSINOPHIL # BLD AUTO: 0.2 X10E3/UL (ref 0–0.4)
EOSINOPHIL NFR BLD AUTO: 3 %
ERYTHROCYTE [DISTWIDTH] IN BLOOD BY AUTOMATED COUNT: 13.9 % (ref 11.6–15.4)
GLOBULIN SER CALC-MCNC: 2.5 G/DL (ref 1.5–4.5)
GLUCOSE SERPL-MCNC: 211 MG/DL (ref 65–99)
HBA1C MFR BLD: 8.7 % (ref 4.8–5.6)
HCT VFR BLD AUTO: 47.2 % (ref 37.5–51)
HDLC SERPL-MCNC: 38 MG/DL
HGB BLD-MCNC: 15.5 G/DL (ref 13–17.7)
IMM GRANULOCYTES # BLD AUTO: 0 X10E3/UL (ref 0–0.1)
IMM GRANULOCYTES NFR BLD AUTO: 0 %
LDLC SERPL CALC-MCNC: 57 MG/DL (ref 0–99)
LYMPHOCYTES # BLD AUTO: 1.4 X10E3/UL (ref 0.7–3.1)
LYMPHOCYTES NFR BLD AUTO: 19 %
MCH RBC QN AUTO: 27.7 PG (ref 26.6–33)
MCHC RBC AUTO-ENTMCNC: 32.8 G/DL (ref 31.5–35.7)
MCV RBC AUTO: 84 FL (ref 79–97)
MICROALBUMIN UR-MCNC: 13.5 UG/ML
MONOCYTES # BLD AUTO: 0.7 X10E3/UL (ref 0.1–0.9)
MONOCYTES NFR BLD AUTO: 9 %
NEUTROPHILS # BLD AUTO: 5.2 X10E3/UL (ref 1.4–7)
NEUTROPHILS NFR BLD AUTO: 68 %
PLATELET # BLD AUTO: 164 X10E3/UL (ref 150–450)
POTASSIUM SERPL-SCNC: 3.5 MMOL/L (ref 3.5–5.2)
PROT SERPL-MCNC: 6.6 G/DL (ref 6–8.5)
RBC # BLD AUTO: 5.59 X10E6/UL (ref 4.14–5.8)
SODIUM SERPL-SCNC: 139 MMOL/L (ref 134–144)
TRIGL SERPL-MCNC: 165 MG/DL (ref 0–149)
VLDLC SERPL CALC-MCNC: 28 MG/DL (ref 5–40)
WBC # BLD AUTO: 7.6 X10E3/UL (ref 3.4–10.8)

## 2022-09-02 NOTE — TELEPHONE ENCOUNTER
Caller: DONAL GUTIERREZ    Relationship to patient: Emergency Contact    Best call back number: [phone]    Patient is needing: PATIENT'S DAUGHTER STATES THAT PATIENT DOES NOT WANT TO HAVE ANY TESTING OR SEE CARDIOLOGIST UNTIL January.   CARDIOLOGIST CALLED PATIENT AND SCHEDULED APPOINTMENT,  SAID HE DIDN'T WANT TO GO UNTIL January, SO DONAL WILL CANCEL THAT APPOINTMENT

## 2022-09-02 NOTE — TELEPHONE ENCOUNTER
----- Message from Bg Verdin III, MD sent at 9/1/2022  2:01 PM EDT -----  Regarding: RE: AAA  Thanks-    We'll get him in the office to be seen.    Magalys, please schedule this patient for an appt with me or NP, thanks      ----- Message -----  From: Anju Correia APRN  Sent: 8/22/2022   9:13 AM EDT  To: Bg Verdin III, MD, #  Subject: RE: AAA                                            WHITNEY Fernandez - Dr. Verdin was out last week and this week. He will return next week. Hopefully, he can address then.     ----- Message -----  From: Calin Carlson MD  Sent: 8/16/2022  10:36 AM EDT  To: Bg Verdin III, MD, Triston Cleveland MD  Subject: AAA                                              Drs,  I just wanted to let you to know that I saw this gentleman in my office for abdominal aortic aneurysm.  He has very difficult anatomy for endovascular repair and is not a candidate for conventional endograft.  His aneurysm is less than 5.5 cm for now so no imminent plans for surgery but I am trying to consider what sort of options he would have.  Seems like a very poor candidate for open aortic reconstruction.  Likely even prohibitive risk.  Would be interested in your thoughts as well.  Looks like his last stress test was 2019. -Oneil Carlson

## 2022-09-02 NOTE — TELEPHONE ENCOUNTER
----- Message from Magalys Clark MA sent at 9/2/2022  9:15 AM EDT -----  Regarding: FW: AAA  Pt has been schedule to see Anju ESCOBAR on 9/8/22 at 12:30 PM.      ----- Message -----  From: Bg Verdin III, MD  Sent: 9/1/2022   2:01 PM EDT  To: Calin Carlson MD, Magalys Clark MA, #  Subject: RE: AAA                                          Thanks-    We'll get him in the office to be seen.    Magalys, please schedule this patient for an appt with me or NP, thanks      ----- Message -----  From: Anju Correia, PASTOR  Sent: 8/22/2022   9:13 AM EDT  To: Bg Verdin III, MD, #  Subject: RE: AAA                                            Dr. Carlson,     WHITNEY - Dr. Verdin was out last week and this week. He will return next week. Hopefully, he can address then.     ----- Message -----  From: Calin Carlson MD  Sent: 8/16/2022  10:36 AM EDT  To: Bg Verdin III, MD, Triston Cleveland MD  Subject: AAA                                              Drs,  I just wanted to let you to know that I saw this gentleman in my office for abdominal aortic aneurysm.  He has very difficult anatomy for endovascular repair and is not a candidate for conventional endograft.  His aneurysm is less than 5.5 cm for now so no imminent plans for surgery but I am trying to consider what sort of options he would have.  Seems like a very poor candidate for open aortic reconstruction.  Likely even prohibitive risk.  Would be interested in your thoughts as well.  Looks like his last stress test was 2019. -Oneil Carlson

## 2022-09-02 NOTE — TELEPHONE ENCOUNTER
Patient's daughter, Dalila called back after moving appointment up. She states patient has a lot of anxiety surrounding doctor visits and therefore they would like to change appointment from 9/8 back to 11/8.     Thank you  Noa

## 2022-09-06 DIAGNOSIS — E11.59 TYPE 2 DIABETES MELLITUS WITH OTHER CIRCULATORY COMPLICATION, WITHOUT LONG-TERM CURRENT USE OF INSULIN: Primary | ICD-10-CM

## 2022-09-06 RX ORDER — WARFARIN SODIUM 7.5 MG/1
TABLET ORAL
Qty: 90 TABLET | Refills: 0 | Status: SHIPPED | OUTPATIENT
Start: 2022-09-06 | End: 2023-01-25

## 2022-09-07 RX ORDER — BLOOD SUGAR DIAGNOSTIC
1 STRIP MISCELLANEOUS DAILY
Qty: 100 EACH | Refills: 0 | Status: SHIPPED | OUTPATIENT
Start: 2022-09-07 | End: 2022-09-26

## 2022-09-07 NOTE — TELEPHONE ENCOUNTER
Rx Refill Note  Requested Prescriptions     Signed Prescriptions Disp Refills   • Accu-Chek Guide test strip 100 each 0     Si each by Other route Daily. Use as instructed     Authorizing Provider: WAI MARTIN     Ordering User: MALLORY ROMERO      Last office visit with prescribing clinician: 2022      Next office visit with prescribing clinician: 2023            Mallory Romero MA  22, 16:28 EDT

## 2022-09-07 NOTE — TELEPHONE ENCOUNTER
Caller: Marilee Faustin    Relationship: Emergency Contact    Best call back number: [phone]    Requested Prescriptions:   Requested Prescriptions     Pending Prescriptions Disp Refills   • Accu-Chek Guide test strip       Sig: Use as instructed        Pharmacy where request should be sent: [id number] [place], [place] - [address] AT [facility] - [phone]  - [phone] FX     Additional details provided by patient: WAS SUPPOSE TO BE SENT IN LAST WEEK    Does the patient have less than a 3 day supply:  [x] Yes  [] No    Chula Garner Rep   09/07/22 08:34 EDT

## 2022-09-15 ENCOUNTER — ANTICOAGULATION VISIT (OUTPATIENT)
Dept: FAMILY MEDICINE CLINIC | Facility: CLINIC | Age: 77
End: 2022-09-15

## 2022-09-15 DIAGNOSIS — I63.411 CEREBROVASCULAR ACCIDENT (CVA) DUE TO EMBOLISM OF RIGHT MIDDLE CEREBRAL ARTERY: Primary | ICD-10-CM

## 2022-09-15 LAB — INR PPP: 2.4 (ref 2–3)

## 2022-09-15 PROCEDURE — 36416 COLLJ CAPILLARY BLOOD SPEC: CPT | Performed by: INTERNAL MEDICINE

## 2022-09-15 PROCEDURE — 85610 PROTHROMBIN TIME: CPT | Performed by: INTERNAL MEDICINE

## 2022-09-19 ENCOUNTER — APPOINTMENT (OUTPATIENT)
Dept: CARDIOLOGY | Facility: HOSPITAL | Age: 77
End: 2022-09-19

## 2022-09-26 RX ORDER — BLOOD SUGAR DIAGNOSTIC
STRIP MISCELLANEOUS
Qty: 100 EACH | Refills: 3 | Status: SHIPPED | OUTPATIENT
Start: 2022-09-26

## 2022-09-26 NOTE — TELEPHONE ENCOUNTER
Rx Refill Note  Requested Prescriptions     Pending Prescriptions Disp Refills   • Accu-Chek Guide test strip [Pharmacy Med Name: ACCU-CHEK GUIDE TEST STRIPS 100S]       Sig: TEST BLOOD SUGAR FOUR TIMES DAILY      Last office visit with prescribing clinician: 9/1/2022      Next office visit with prescribing clinician: 1/9/2023            Faraz Sanders, SAVANNAH/HUSSEIN  09/26/22, 16:12 EDT

## 2022-09-29 ENCOUNTER — ANTICOAGULATION VISIT (OUTPATIENT)
Dept: FAMILY MEDICINE CLINIC | Facility: CLINIC | Age: 77
End: 2022-09-29

## 2022-09-29 DIAGNOSIS — I63.411 CEREBROVASCULAR ACCIDENT (CVA) DUE TO EMBOLISM OF RIGHT MIDDLE CEREBRAL ARTERY: Primary | ICD-10-CM

## 2022-09-29 LAB — INR PPP: 2.6 (ref 2–3)

## 2022-09-29 PROCEDURE — 85610 PROTHROMBIN TIME: CPT | Performed by: INTERNAL MEDICINE

## 2022-09-29 PROCEDURE — 36416 COLLJ CAPILLARY BLOOD SPEC: CPT | Performed by: INTERNAL MEDICINE

## 2022-10-27 ENCOUNTER — ANTICOAGULATION VISIT (OUTPATIENT)
Dept: FAMILY MEDICINE CLINIC | Facility: CLINIC | Age: 77
End: 2022-10-27

## 2022-10-27 DIAGNOSIS — I63.411 CEREBROVASCULAR ACCIDENT (CVA) DUE TO EMBOLISM OF RIGHT MIDDLE CEREBRAL ARTERY: Primary | ICD-10-CM

## 2022-10-27 LAB — INR PPP: 1.9 (ref 2–3)

## 2022-10-27 PROCEDURE — 85610 PROTHROMBIN TIME: CPT | Performed by: INTERNAL MEDICINE

## 2022-10-27 PROCEDURE — 36416 COLLJ CAPILLARY BLOOD SPEC: CPT | Performed by: INTERNAL MEDICINE

## 2022-10-27 NOTE — TELEPHONE ENCOUNTER
Caller: MATTDONAL    Relationship: Emergency Contact    Best call back number: [phone]    What was the call regarding: PATIENT'S DAUGHTER STATED THAT PATIENT HAD HIS INR CHECKED THIS MORNING AT THE [facility] OFFICE AND HIS INR WAS BELOW 2. PATIENT'S DAUGHTER STATED THAT PATIENT HAS BEEN WAITING TO HEAR FROM DR MARTIN'S OFFICE ON WHAT HE NEEDS TO DO. PATIENT'S DAUGHTER IS REQUESTING THAT SHE GET A CALL BACK TO LET HER KNOW WHAT INSTRUCTIONS HE NEEDS TO FOLLOW IN REGARDS TO HIS INR. PLEASE ADVISE.     Do you require a callback: YES

## 2022-10-28 NOTE — TELEPHONE ENCOUNTER
Patient's wife called about clarification about 's Warfarin dosage. Ask for an urgent call back. Best number is

## 2022-11-08 ENCOUNTER — OFFICE VISIT (OUTPATIENT)
Dept: CARDIOLOGY | Facility: CLINIC | Age: 77
End: 2022-11-08

## 2022-11-08 VITALS
HEIGHT: 71 IN | SYSTOLIC BLOOD PRESSURE: 130 MMHG | BODY MASS INDEX: 26.46 KG/M2 | WEIGHT: 189 LBS | DIASTOLIC BLOOD PRESSURE: 72 MMHG | HEART RATE: 70 BPM

## 2022-11-08 DIAGNOSIS — I10 ESSENTIAL HYPERTENSION: ICD-10-CM

## 2022-11-08 DIAGNOSIS — Z95.2 S/P AVR: Primary | ICD-10-CM

## 2022-11-08 DIAGNOSIS — I71.40 ABDOMINAL AORTIC ANEURYSM (AAA) WITHOUT RUPTURE, UNSPECIFIED PART: ICD-10-CM

## 2022-11-08 DIAGNOSIS — Z95.1 S/P CABG (CORONARY ARTERY BYPASS GRAFT): ICD-10-CM

## 2022-11-08 DIAGNOSIS — I25.10 CHRONIC CORONARY ARTERY DISEASE: ICD-10-CM

## 2022-11-08 DIAGNOSIS — I48.0 PAROXYSMAL ATRIAL FIBRILLATION: ICD-10-CM

## 2022-11-08 PROCEDURE — 93000 ELECTROCARDIOGRAM COMPLETE: CPT | Performed by: INTERNAL MEDICINE

## 2022-11-08 PROCEDURE — 99214 OFFICE O/P EST MOD 30 MIN: CPT | Performed by: INTERNAL MEDICINE

## 2022-11-08 NOTE — PROGRESS NOTES
Subjective:     Encounter Date:11/08/22      Patient ID: Conor Faustin is a 77 y.o. male.    Chief Complaint:  History of Present Illness    Dear Dr. Cleveland,    I had the pleasure seeing this patient in the office today.    He has previously been cared for by Dr. Hale.  I saw him April 2022.  He has a history of coronary disease, prior CABG, prior aortic valve replacement, hypertension, chronic renal failure, and a recent CVA with A. fib.    This patient just had an abdominal CT scan performed by yourself.  This showed an infrarenal abdominal aortic aneurysm measuring 5.2 cm.  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  According to note there are plans for a PET scan although he has not yet had that done.    He denies any cardiac complaints.  No chest pain or chest discomfort, no feeling of palpitations or tachycardia.  No presyncope or syncope.  He walks with a cane.  He does have some shortness of breath with activity which is unchanged from his baseline.    Patient has a history of AVR as well as a history coronary disease.  In January 2017 he had carotid bypass grafting x4 with left internal mammary arterial artery to the LAD, and reverse saphenous vein graft to the first diagonal, vein graft to the first obtuse marginal, and vein graft to the posterior descending coronary artery.  Patient also had aortic valve replacement with a 25 mm magna pericardial prosthesis.    On March 9 patient presented to [facility] with aphasia.  .  He was found to be in A. fib on the presentation which was new onset.  He had an MRI of the brain which confirmed small acute right MCA infarct without hemorrhage.  He was seen by both neurology and cardiology.  He was started on chronic anticoagulation at that time.  Echocardiogram was done which showed ejection fraction  "35 to 40% but was thought to be secondary to his acute A. fib.  Ejection fraction was normal in December 2021.    He was then seen on March 31 by Dr. Hale recommended continuation of the beta-blockade and anticoagulation and there was get a follow-up in a month for potential cardioversion.    The following portions of the patient's history were reviewed and updated as appropriate: allergies, current medications, past family history, past medical history, past social history, past surgical history and problem list.      ECG 12 Lead    Date/Time: 11/8/2022 12:43 PM  Performed by: Bg Verdin III, MD  Authorized by: Bg Verdin III, MD   Comparison: compared with previous ECG   Similar to previous ECG  Rhythm: sinus rhythm  Rate: normal  Conduction: conduction normal  QRS axis: normal  Other findings: left ventricular hypertrophy with strain    Clinical impression: non-specific ECG               Objective:     Vitals:    11/08/22 0914   BP: 130/72   Pulse: 70   Weight: 85.7 kg (189 lb)   Height: 180.3 cm (71\")     Body mass index is 26.36 kg/m².      Vitals reviewed.   Constitutional:       General: Not in acute distress.     Appearance: Well-developed. Not diaphoretic.   Eyes:      General:         Right eye: No discharge.         Left eye: No discharge.      Conjunctiva/sclera: Conjunctivae normal.      Pupils: Pupils are equal, round, and reactive to light.   HENT:      Head: Normocephalic and atraumatic.      Nose: Nose normal.   Neck:      Thyroid: No thyromegaly.      Trachea: No tracheal deviation.      Lymphadenopathy: No cervical adenopathy.   Pulmonary:      Effort: Pulmonary effort is normal. No respiratory distress.      Breath sounds: Normal breath sounds. No stridor.   Chest:      Chest wall: Not tender to palpatation.   Cardiovascular:      Normal rate. Regular rhythm.      Murmurs: There is no murmur.      . No S3 gallop. No click. No rub.   Pulses:     Intact distal pulses.   Edema:     " Peripheral edema absent.   Abdominal:      General: Bowel sounds are normal. There is no distension.      Palpations: Abdomen is soft. There is no abdominal mass.      Tenderness: There is no abdominal tenderness. There is no guarding or rebound.   Musculoskeletal: Normal range of motion.         General: No tenderness or deformity.      Cervical back: Normal range of motion and neck supple. Skin:     General: Skin is warm and dry.      Findings: No erythema or rash.   Neurological:      Mental Status: Alert and oriented to person, place, and time.      Deep Tendon Reflexes: Reflexes are normal and symmetric.   Psychiatric:         Thought Content: Thought content normal.         Data and records reviewed:     Lab Results   Component Value Date    GLUCOSE 211 (H) 09/01/2022    BUN 15 09/01/2022    CREATININE 1.83 (H) 09/01/2022    EGFRIFNONA 29 (L) 02/02/2018    BCR 8 (L) 09/01/2022    K 3.5 09/01/2022    CO2 27 09/01/2022    CALCIUM 9.3 09/01/2022    PROTENTOTREF 6.6 09/01/2022    ALBUMIN 4.1 09/01/2022    LABIL2 1.6 09/01/2022    AST 22 09/01/2022    ALT 16 09/01/2022     Lab Results   Component Value Date    CHOL 159 03/13/2017     Lab Results   Component Value Date    TRIG 165 (H) 09/01/2022    TRIG 173 (H) 08/24/2020    TRIG 101 12/20/2019     Lab Results   Component Value Date    HDL 38 (L) 09/01/2022    HDL 40 08/24/2020    HDL 42 12/20/2019     Lab Results   Component Value Date    LDL 57 09/01/2022    LDL 71 08/24/2020    LDL 72 12/20/2019     Lab Results   Component Value Date    VLDL 28 09/01/2022    VLDL 35 08/24/2020    VLDL 20 12/20/2019     Lab Results   Component Value Date    LDLHDL 2.05 03/13/2017     CBC    CBC 4/5/22 6/15/22 9/1/22   WBC 10.5 8.24 7.6   RBC 5.40 4.98 5.59   Hemoglobin 15.6 14.3 15.5   Hematocrit 46.5 43.2 47.2   MCV 86 86.7 84   MCH 28.9 28.7 27.7   MCHC 33.5 33.1 32.8   RDW 13.4 13.0 13.9   Platelets 230 194 164           No radiology results for the last 90 days.  Results for  orders placed during the hospital encounter of 12/23/21    Adult Transthoracic Echo Complete W/ Cont if Necessary Per Protocol    Interpretation Summary  · There is a bioprosthetic aortic valve present.  · The left ventricular cavity is borderline dilated.  · The following left ventricular wall segments are hypokinetic: apical anterior, apical lateral, apical inferior, apical septal and apex hypokinetic.  · Calculated left ventricular EF = 50% Estimated left ventricular EF was in agreement with the calculated left ventricular EF.  · Left ventricular diastolic function is consistent with (grade Ia w/high LAP) impaired relaxation.  · There is no evidence of pericardial effusion. .          Assessment:          Diagnosis Plan   1. S/P AVR  ECG 12 Lead      2. Paroxysmal atrial fibrillation (HCC)  ECG 12 Lead      3. S/P CABG (coronary artery bypass graft)  ECG 12 Lead      4. Chronic coronary artery disease  ECG 12 Lead      5. Abdominal aortic aneurysm (AAA) without rupture, unspecified part  ECG 12 Lead      6. Essential hypertension  ECG 12 Lead             Plan:       1.  Paroxysmal atrial fibrillation, remains in sinus rhythm, continue metoprolol  2.  Coronary disease, status post CABG-no evidence of acute coronary syndrome  3.  Prior assessments of ejection fraction showed normal ejection fraction, EF was diminished in the setting of acute A. fib, we will plan on subsequent surveillance testing  4.  Abdominal aortic aneurysm- has followed with vascular  5.  CVA, still walks with a cane, following with neurology and yourself.  6.  Abdominal mass, evaluation underway  Thank you very much for allowing us to participate in the care of this pleasant patient.  Please don't hesitate to call if I can be of assistance in any way.      Current Outpatient Medications:   •  Accu-Chek FastClix Lancets misc, 1 each by Other route 4 (Four) Times a Day., Disp: 102 each, Rfl: 4  •  Accu-Chek Guide test strip, TEST BLOOD SUGAR  FOUR TIMES DAILY, Disp: 100 each, Rfl: 3  •  B Complex Vitamins (VITAMIN B COMPLEX PO), Take  by mouth., Disp: , Rfl:   •  Blood Glucose Monitoring Suppl (Accu-Chek Guide) w/Device kit, FPD, Disp: , Rfl:   •  Coenzyme Q10 (CO Q 10 PO), Take  by mouth., Disp: , Rfl:   •  ezetimibe (Zetia) 10 MG tablet, Take 1 tablet by mouth Daily., Disp: 30 tablet, Rfl: 4  •  furosemide (LASIX) 40 MG tablet, Take 1 tablet by mouth 2 (Two) Times a Day. (Patient taking differently: Take 1 tablet by mouth Daily. Every morning), Disp: 180 tablet, Rfl: 1  •  metoprolol succinate XL (TOPROL-XL) 25 MG 24 hr tablet, Take 1 tablet by mouth Daily., Disp: 90 tablet, Rfl: 3  •  nitroglycerin (NITROSTAT) 0.4 MG SL tablet, Place 1 tablet under the tongue Every 5 (Five) Minutes As Needed for Chest Pain. Take no more than 3 doses in 15 minutes., Disp: 25 tablet, Rfl: 0  •  VITAMIN D PO, Take  by mouth., Disp: , Rfl:   •  warfarin (COUMADIN) 1 MG tablet, TAKE 1 TABLET BY MOUTH DAILY. ADD TO CURRENT THERAPY, Disp: 30 tablet, Rfl: 1  •  warfarin (COUMADIN) 5 MG tablet, Take 5 mg by mouth Daily. Su/Tu/W/Th/Sat, Disp: , Rfl:   •  warfarin (COUMADIN) 7.5 MG tablet, TAKE 1 TABLET BY MOUTH AS DIRECTED, Disp: 90 tablet, Rfl: 0         Return in about 6 months (around 5/8/2023).

## 2022-11-09 ENCOUNTER — OFFICE VISIT (OUTPATIENT)
Dept: NEUROLOGY | Facility: CLINIC | Age: 77
End: 2022-11-09

## 2022-11-09 VITALS
BODY MASS INDEX: 26.45 KG/M2 | HEIGHT: 71 IN | RESPIRATION RATE: 16 BRPM | SYSTOLIC BLOOD PRESSURE: 130 MMHG | HEART RATE: 60 BPM | WEIGHT: 188.93 LBS | DIASTOLIC BLOOD PRESSURE: 70 MMHG

## 2022-11-09 DIAGNOSIS — I69.30 SEQUELAE, POST-STROKE: Primary | ICD-10-CM

## 2022-11-09 PROCEDURE — 99204 OFFICE O/P NEW MOD 45 MIN: CPT | Performed by: STUDENT IN AN ORGANIZED HEALTH CARE EDUCATION/TRAINING PROGRAM

## 2022-11-09 NOTE — PROGRESS NOTES
Chief Complaint   Patient presents with   • Cerebrovascular Accident     March 2022       Patient ID: Conor Faustin is a 77 y.o. male.    HPI:    This patient is a 77-year-old male with paroxysmal atrial fibrillation on warfarin. He also has severe aortic stenosis, ischemic cardiomyopathy with decreased EF reported in EMR to be 35 to 40%, elevated troponin, stage 3 chronic kidney disease, atherosclerotic cerebrovascular disease, new onset atrial fibrillation, abdominal aortic aneurysm, tobacco use in the past. Referred by Dr. Triston Cleveland for stroke. He had an echocardiogram done during his hospitalization. His EF was calculated to be 35 to 40% with hypokinetic apical wall, mildly dilated left atrium. He had a CTA of his head and neck which showed no evidence for proximal large vessel occlusion. Proximal middle cerebral arteries are patent. Attenuated and irregular appearance of a few distal right middle cerebral artery. M3 branches could represent moderate to severe distal stenoses, atherosclerosis of the carotid bifurcations with mild stenoses, less than 50% of the ICA origins. No high grade stenosis or occlusion of the cervical artery or cervical arterial vasculature. The MRI showed a right posterior frontal linear infarct. He had a barium swallow done which showed laryngeal penetration without aspiration during swallows with thin liquids and nectar consistency. He is a current smoker. The patient is accompanied today his daughter Dalila and his wife Marilee.    Stroke  The patient reports he had a stroke on 03/09/2022. He reports of no known symptoms. He states when he went up to go to the bathroom, as he was going to the bathroom all of a sudden he started to lean over and his jaw and lip on his left side started to droop a little and he started drooling. He started talked to himself and his words were not coming out how he wanted them to. He reports of weakness on the left side of his body. The patient  "reports he has had 7 teeth pulled so he talks a little \"cotton mouth\" already. He reports his father has a history of stroke. He reports feeling normal when he went to sleep, and normal when he woke up prior to the stroke. He reports he thought he might have a blood clot in the back of his right thigh. He states he felt pain and pulling there. The patient states his granddaughter works at [employer] and she had the team ready and prepped for him. He states the hospital set him up to go to a nice rehab in [place] and he was there for almost 2 weeks. He reports being in the hospital for 3 to 4 days. He reports the rehab was very helpful. He denies using a cane before his stroke. He reports of experiencing depression. He reports a decrease in strength in his left hand. The wife states he had home therapy too, which helped. She states he is left hand dominant and he can not write like he used to. 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 He denies any weakness in his left leg. He states his memory is good and denies any changes in his vision. His daughter states they gave him a medicine in the hospital that made him sleep for 3 days. The patient confirms he is on warfarin. His daughter states the patient is getting his blood checked every 2 weeks and that he goes tomorrow, 11/10/2022. He denies any issues with swallowing. He states his diet changed from taking warfarin, but otherwise it is no different than before the stroke. He states he is not back to driving yet. The patient reports when he was a child he injured his thumb. He states he could not move his left arm in the beginning of his recovery. He reports he quit taking aspirin 2 weeks before his stroke. His daughter states he switched cardiologists, and he has an appointment in 6 months. She states he was on a statin drug. He states he did " not want to be on a statin drug because they cause pancreas damage. He states he has an ophthalmologist who he has not seen in a long time because he can not afford him. He states he has seen Dr. Meehan, an optometrist in the past. The patients daughter states the patient had lab work done on 03/01/2022, which had a flag on it. She states his cardiologist and former family doctor did not catch that it was flagged. She states the former cardiologist told them that he had been in atrial fibrillation and he would always be in atrial fibrillation for the rest of his life and that he wanted to do a shock treatment on his heart.    Diabetes  The patient is aware he has diabetes. His daughter states he has lost weight. The patient states he does not eat as much sugar as he used to.    Tobacco use  The patient states he smokes a pack a day. He states he will light a cigarette and only take 2 puffs then lets it burn out.     The following portions of the patient's history were reviewed and updated as appropriate: allergies, current medications, past family history, past medical history, past social history, past surgical history and problem list.    Review of Systems   Allergic/Immunologic: Negative for food allergies.   Neurological: Negative for dizziness, tremors, speech difficulty, light-headedness and headaches.   Psychiatric/Behavioral: Positive for agitation. Negative for behavioral problems, confusion and sleep disturbance. The patient is nervous/anxious.         Vitals:    11/09/22 0804   BP: 130/70   Pulse: 60   Resp: 16       Neurologic Exam     Mental Status   Speech: speech is normal   Level of consciousness: alert    Cranial Nerves     CN II   Visual fields full to confrontation.     CN III, IV, VI   Pupils are equal, round, and reactive to light.  Extraocular motions are normal.     CN V   Facial sensation intact.     CN VII   Facial expression full, symmetric.     CN VIII   Hearing: intact    CN IX, X    Palate: symmetric    CN XI   Right trapezius strength: normal  Left trapezius strength: normal    CN XII   Tongue: not atrophic  Fasciculations: absent  Tongue deviation: none    Motor Exam   Muscle bulk: normal    Strength   Right deltoid: 5/5  Left deltoid: 5/5  Right biceps: 5/5  Left biceps: 5/5  Right triceps: 5/5  Left triceps: 5/5  Right iliopsoas: 5/5  Left iliopsoas: 5/5  Right quadriceps: 5/5  Left quadriceps: 5/5  Right hamstrin/5  Left hamstrin/5  Right anterior tibial: 5/5  Left anterior tibial: 5/5  Right gastroc: 5/5  Left gastroc: 5/5Grip 5 out of 5 bilaterally     Sensory Exam   Right arm light touch: normal  Left arm light touch: normal  Right leg light touch: normal  Left leg light touch: normal    Gait, Coordination, and Reflexes     Coordination   Finger to nose coordination: normal  Heel to shin coordination: normal    Reflexes   Right brachioradialis: 2+  Left brachioradialis: 2+  Right biceps: 2+  Left biceps: 2+  Right patellar: 2+  Left patellar: 2+  Right achilles: 0  Left achilles: 0      Physical Exam  Eyes:      Extraocular Movements: EOM normal.      Pupils: Pupils are equal, round, and reactive to light.   Neurological:      Coordination: Finger-Nose-Finger Test and Heel to Shin Test normal.      Deep Tendon Reflexes:      Reflex Scores:       Bicep reflexes are 2+ on the right side and 2+ on the left side.       Brachioradialis reflexes are 2+ on the right side and 2+ on the left side.       Patellar reflexes are 2+ on the right side and 2+ on the left side.       Achilles reflexes are 0 on the right side and 0 on the left side.  Psychiatric:         Speech: Speech normal.         Procedures    Assessment/Plan:    He had stroke in March. Energy back. BP is good.  Workup as detailed in beginning of note.  Plan  -if cleared by ophthalmology he can from a neurological standpoint drive.       Diagnoses and all orders for this visit:    1. Sequelae, post-stroke  (Primary)  Assessment & Plan:  The patient has a referral to Ophthalmology.    Orders:  -     Ambulatory Referral to Ophthalmology       I spent 49 minutes caring for this patient on this date of service. This time includes time spent by me in the following activities as necessary: preparing for the visit, reviewing tests, medical records and previous visits, obtaining and/or reviewing a separately obtained history, performing a medically appropriate exam and/or evaluation, counseling and educating the patient, and/or communicating with other healthcare professionals, documenting information in the medical record, independently interpreting results and communicating that information with the patient, and developing a medically appropriate treatment plan with consideration of other conditions, medications, and treatments.    Transcribed from ambient dictation for Aashish Holloway MD by Taylor Humes.  11/09/22   11:04 EST    Patient or patient representative verbalized consent to the visit recording.  I have personally performed the services described in this document as transcribed by the above individual, and it is both accurate and complete.  Aashish Holloway MD  11/28/2022  01:14 EST

## 2022-11-10 ENCOUNTER — ANTICOAGULATION VISIT (OUTPATIENT)
Dept: FAMILY MEDICINE CLINIC | Facility: CLINIC | Age: 77
End: 2022-11-10

## 2022-11-10 DIAGNOSIS — I63.411 CEREBROVASCULAR ACCIDENT (CVA) DUE TO EMBOLISM OF RIGHT MIDDLE CEREBRAL ARTERY: Primary | ICD-10-CM

## 2022-11-10 LAB — INR PPP: 2.3 (ref 2–3)

## 2022-11-10 PROCEDURE — 85610 PROTHROMBIN TIME: CPT | Performed by: INTERNAL MEDICINE

## 2022-11-10 PROCEDURE — 36416 COLLJ CAPILLARY BLOOD SPEC: CPT | Performed by: INTERNAL MEDICINE

## 2022-11-17 ENCOUNTER — PATIENT ROUNDING (BHMG ONLY) (OUTPATIENT)
Dept: NEUROLOGY | Facility: CLINIC | Age: 77
End: 2022-11-17

## 2022-12-07 DIAGNOSIS — I25.10 CHRONIC CORONARY ARTERY DISEASE: ICD-10-CM

## 2022-12-08 ENCOUNTER — ANTICOAGULATION VISIT (OUTPATIENT)
Dept: FAMILY MEDICINE CLINIC | Facility: CLINIC | Age: 77
End: 2022-12-08

## 2022-12-08 DIAGNOSIS — I63.411 CEREBROVASCULAR ACCIDENT (CVA) DUE TO EMBOLISM OF RIGHT MIDDLE CEREBRAL ARTERY: Primary | ICD-10-CM

## 2022-12-08 LAB — INR PPP: 3.5 (ref 2–3)

## 2022-12-08 PROCEDURE — 36416 COLLJ CAPILLARY BLOOD SPEC: CPT | Performed by: INTERNAL MEDICINE

## 2022-12-08 PROCEDURE — 85610 PROTHROMBIN TIME: CPT | Performed by: INTERNAL MEDICINE

## 2022-12-08 RX ORDER — EZETIMIBE 10 MG/1
10 TABLET ORAL DAILY
Qty: 30 TABLET | Refills: 4 | Status: SHIPPED | OUTPATIENT
Start: 2022-12-08

## 2022-12-08 NOTE — TELEPHONE ENCOUNTER
Rx Refill Note  Requested Prescriptions     Pending Prescriptions Disp Refills   • ezetimibe (ZETIA) 10 MG tablet [Pharmacy Med Name: EZETIMIBE 10MG TABLETS] 30 tablet 4     Sig: TAKE 1 TABLET BY MOUTH DAILY      Last office visit with prescribing clinician: 9/1/2022   Last telemedicine visit with prescribing clinician: 12/8/2022   Next office visit with prescribing clinician: 1/9/2023                         Would you like a call back once the refill request has been completed: [] Yes [] No    If the office needs to give you a call back, can they leave a voicemail: [] Yes [] No    Sarah Wade MA  12/08/22, 07:16 EST

## 2022-12-09 NOTE — TELEPHONE ENCOUNTER
Caller: DONAL GUTIERREZ    Relationship: Emergency Contact    Best call back number: [phone]    Equipment requested: PORTABLE PT INR MACHINE    Reason for the request: TO CHECK HIS INR.    Prescribing Provider: DR. MARTIN    Additional information or concerns: PATIENT'S INR SPIKED AND THEY AREN'T SURE WHY AND HE IS REQUESTING A PORTABLE MACHINE TO CHECK IT WHEN NEEDED.  THE PATIENT ISN'T EATING HARDLY ANYTHING AND THEY GAVE HIM AN ENSURE , WOULD THIS RAISE HIS BLOOD SUGAR? IF HE ISN'T EATING WHY WOULD HIS BLOOD SUGAR SPIKE?    PLEASE CONTACT PATIENT'S DAUGHTER TO ADVISE.

## 2022-12-15 ENCOUNTER — OFFICE VISIT (OUTPATIENT)
Dept: FAMILY MEDICINE CLINIC | Facility: CLINIC | Age: 77
End: 2022-12-15

## 2022-12-15 ENCOUNTER — ANTICOAGULATION VISIT (OUTPATIENT)
Dept: FAMILY MEDICINE CLINIC | Facility: CLINIC | Age: 77
End: 2022-12-15
Payer: MEDICARE

## 2022-12-15 VITALS
WEIGHT: 191.6 LBS | HEIGHT: 71 IN | HEART RATE: 70 BPM | DIASTOLIC BLOOD PRESSURE: 89 MMHG | BODY MASS INDEX: 26.82 KG/M2 | OXYGEN SATURATION: 97 % | SYSTOLIC BLOOD PRESSURE: 150 MMHG

## 2022-12-15 DIAGNOSIS — N18.30 STAGE 3 CHRONIC KIDNEY DISEASE, UNSPECIFIED WHETHER STAGE 3A OR 3B CKD: ICD-10-CM

## 2022-12-15 DIAGNOSIS — R60.0 BILATERAL LOWER EXTREMITY EDEMA: Primary | ICD-10-CM

## 2022-12-15 DIAGNOSIS — I63.411 CEREBROVASCULAR ACCIDENT (CVA) DUE TO EMBOLISM OF RIGHT MIDDLE CEREBRAL ARTERY: Primary | ICD-10-CM

## 2022-12-15 DIAGNOSIS — I10 ESSENTIAL HYPERTENSION: ICD-10-CM

## 2022-12-15 DIAGNOSIS — I25.5 ISCHEMIC CARDIOMYOPATHY: ICD-10-CM

## 2022-12-15 DIAGNOSIS — I50.31 ACUTE DIASTOLIC CHF (CONGESTIVE HEART FAILURE): ICD-10-CM

## 2022-12-15 DIAGNOSIS — I48.0 PAROXYSMAL ATRIAL FIBRILLATION: ICD-10-CM

## 2022-12-15 DIAGNOSIS — Z79.01 CHRONIC ANTICOAGULATION: ICD-10-CM

## 2022-12-15 LAB — INR PPP: 3 (ref 0.9–2)

## 2022-12-15 PROCEDURE — 85610 PROTHROMBIN TIME: CPT | Performed by: INTERNAL MEDICINE

## 2022-12-15 PROCEDURE — 99214 OFFICE O/P EST MOD 30 MIN: CPT | Performed by: INTERNAL MEDICINE

## 2022-12-15 PROCEDURE — 36416 COLLJ CAPILLARY BLOOD SPEC: CPT | Performed by: INTERNAL MEDICINE

## 2022-12-15 NOTE — PROGRESS NOTES
Subjective   Conor Faustin is a 77 y.o. male.     Chief Complaint   Patient presents with   • Swelling in legs above normal   • Shortness of Breath       History of Present Illness   Wife was present during the history-taking and subsequent discussion (and for part of the physical exam) with this patient.  Patient agrees to the presence of the individual during this visit.    Patient initially presented to the office for INR but he and wife had questions.  Upon discussion and entering into the room patient has been having some mild shortness of breath worse when he is laying down and walking.  He has had increased swelling in his lower extremities.  He has been decreased on his furosemide 40 mg once a day a few months ago.  States that he really has not been very hungry has not been eating so instead has been drinking a lot of Pedialyte.  He denies any chest pain nausea vomiting, fever or chills.    INR evaluation.  Currently anticoagulated with warfarin for atrial fib and s/p CVA.  The patient is taking warfarin 5 mg every day except for Monday and Friday 7.5 mg.  The current INR goal is 2-3.  The INR is currently 3.0.  No significant interval events, easy bleeding, bruising, epistaxis, fever, weakness or numbness.      The following portions of the patient's history were reviewed and updated as appropriate: allergies, current medications, past family history, past medical history, past social history, past surgical history and problem list.    Depression Screen:  No flowsheet data found.    Past Medical History:   Diagnosis Date   • AAA (abdominal aortic aneurysm)    • Benign essential hypertension    • COPD (chronic obstructive pulmonary disease) (HCC)    • Coronary artery disease    • Renal cyst     RIGHT   • Smoker     1 PPD   • Stroke ([facility])    • Type 2 diabetes mellitus with circulatory disorder, without long-term current use of insulin ([facility]) 4/5/2022       Past Surgical History:   Procedure Laterality  Date   • AORTIC VALVE REPAIR/REPLACEMENT     • CARDIAC CATHETERIZATION     • CARDIAC CATHETERIZATION N/A 2016    Procedure: Left Heart Cath;  Surgeon: Irena Hale MD;  Location: [facility] INVASIVE LOCATION;  Service:    • CARDIAC CATHETERIZATION     • CORONARY ANGIOPLASTY     • CORONARY ARTERY BYPASS GRAFT N/A 2017    Procedure: INTRAOPERATIVE THIAGO, MIDLINE STERNOTOMY, CORONARY ARTERY BYPASS GRAFTING X 4 UTILIZING LEFT INTERNAL MAMMARY ARTERY AND RIGHT ENDOSCOPICALLY HARVESTED GREATER SAPHENOUS VEIN, AORTIC VALVE REPLACEMENT;  Surgeon: Jassi De Jesus MD;  Location: [facility] OR;  Service:    • PERIPHERAL ARTERIAL STENT GRAFT         Family History   Problem Relation Age of Onset   • No Known Problems Mother    • No Known Problems Father    • No Known Problems Sister    • No Known Problems Brother    • No Known Problems Maternal Aunt    • No Known Problems Maternal Uncle    • No Known Problems Paternal Aunt    • No Known Problems Paternal Uncle    • No Known Problems Maternal Grandmother    • No Known Problems Maternal Grandfather    • No Known Problems Paternal Grandfather        Social History     Socioeconomic History   • Marital status:    Tobacco Use   • Smoking status: Former     Packs/day: 1.00     Years: 64.00     Pack years: 64.00     Types: Cigarettes     Quit date: 3/9/2022     Years since quittin.7   • Smokeless tobacco: Never   Vaping Use   • Vaping Use: Never used   Substance and Sexual Activity   • Alcohol use: No   • Drug use: No   • Sexual activity: Defer       Current Outpatient Medications   Medication Sig Dispense Refill   • Accu-Chek FastClix Lancets misc 1 each by Other route 4 (Four) Times a Day. 102 each 4   • Accu-Chek Guide test strip TEST BLOOD SUGAR FOUR TIMES DAILY 100 each 3   • B Complex Vitamins (VITAMIN B COMPLEX PO) Take  by mouth.     • Blood Glucose Monitoring Suppl (Accu-Chek Guide) w/Device kit FPD     • Coenzyme Q10 (CO Q 10 PO) Take  by  mouth.     • ezetimibe (ZETIA) 10 MG tablet TAKE 1 TABLET BY MOUTH DAILY 30 tablet 4   • furosemide (LASIX) 40 MG tablet Take 1 tablet by mouth 2 (Two) Times a Day. (Patient taking differently: Take 1 tablet by mouth Daily. Every morning) 180 tablet 1   • metoprolol succinate XL (TOPROL-XL) 25 MG 24 hr tablet Take 1 tablet by mouth Daily. 90 tablet 3   • nitroglycerin (NITROSTAT) 0.4 MG SL tablet Place 1 tablet under the tongue Every 5 (Five) Minutes As Needed for Chest Pain. Take no more than 3 doses in 15 minutes. 25 tablet 0   • VITAMIN D PO Take  by mouth.     • warfarin (COUMADIN) 1 MG tablet TAKE 1 TABLET BY MOUTH DAILY. ADD TO CURRENT THERAPY 30 tablet 1   • warfarin (COUMADIN) 5 MG tablet Take 5 mg by mouth Daily. Su/Tu/W/Th/Sat     • warfarin (COUMADIN) 7.5 MG tablet TAKE 1 TABLET BY MOUTH AS DIRECTED 90 tablet 0     No current facility-administered medications for this visit.       Review of Systems   Constitutional: Negative for activity change, appetite change, fatigue, fever, unexpected weight gain and unexpected weight loss.   HENT: Negative for nosebleeds, rhinorrhea, trouble swallowing and voice change.    Eyes: Negative for visual disturbance.   Respiratory: Positive for shortness of breath. Negative for cough, chest tightness and wheezing.    Cardiovascular: Positive for leg swelling. Negative for chest pain and palpitations.   Gastrointestinal: Negative for abdominal pain, blood in stool, constipation, diarrhea, nausea, vomiting, GERD and indigestion.   Genitourinary: Negative for dysuria, frequency and hematuria.   Musculoskeletal: Negative for arthralgias, back pain and myalgias.        Weakness in the left arm and leg but is still able to ambulate.    Skin: Negative for rash and wound.   Neurological: Negative for dizziness, tremors, weakness, light-headedness, numbness, headache and memory problem.   Hematological: Negative for adenopathy. Does not bruise/bleed easily.  "  Psychiatric/Behavioral: Negative for sleep disturbance and depressed mood. The patient is not nervous/anxious.        Objective   /89 (BP Location: Right arm, Patient Position: Sitting, Cuff Size: Adult)   Pulse 70   Ht 180.3 cm (71\")   Wt 86.9 kg (191 lb 9.6 oz)   SpO2 97%   BMI 26.72 kg/m²     Physical Exam  Vitals and nursing note reviewed.   Constitutional:       General: He is not in acute distress.     Appearance: He is well-developed. He is not diaphoretic.   HENT:      Head: Normocephalic and atraumatic.      Right Ear: External ear normal.      Left Ear: External ear normal.      Nose: Nose normal.   Eyes:      Conjunctiva/sclera: Conjunctivae normal.      Pupils: Pupils are equal, round, and reactive to light.   Neck:      Thyroid: No thyromegaly.      Trachea: No tracheal deviation.   Cardiovascular:      Rate and Rhythm: Normal rate and regular rhythm.      Heart sounds: Normal heart sounds. No murmur heard.    No friction rub. No gallop.   Pulmonary:      Effort: Pulmonary effort is normal. No respiratory distress.      Comments: Patient with mild bibasilar crackles noted on exam.  Abdominal:      General: Bowel sounds are normal.      Palpations: Abdomen is soft. There is no mass.      Tenderness: There is no abdominal tenderness. There is no guarding.   Musculoskeletal:         General: Normal range of motion.      Cervical back: Normal range of motion and neck supple.      Right lower leg: Edema present.      Left lower leg: Edema present.      Comments: Bilateral lower extremities with 2-3+ pedal and leg edema extending to the knee and slightly above.  No erythema open lesions or drainage.  No sacral edema.  Left leg distally with compression stocking in place and is smaller than the right leg.   Lymphadenopathy:      Cervical: No cervical adenopathy.   Skin:     General: Skin is warm and dry.      Capillary Refill: Capillary refill takes less than 2 seconds.      Findings: No rash. "   Neurological:      Mental Status: He is alert and oriented to person, place, and time.      Motor: No abnormal muscle tone.      Deep Tendon Reflexes: Reflexes normal.   Psychiatric:         Behavior: Behavior normal.         Thought Content: Thought content normal.         Judgment: Judgment normal.         Recent Results (from the past 2016 hour(s))   POC INR    Collection Time: 09/29/22  8:26 AM    Specimen: Blood   Result Value Ref Range    INR 2.60 (A) 2 - 3   POC INR    Collection Time: 10/27/22  8:29 AM    Specimen: Blood   Result Value Ref Range    INR 1.90 (A) 2 - 3   POC INR    Collection Time: 11/10/22  9:25 AM    Specimen: Blood   Result Value Ref Range    INR 2.30 2 - 3   POC INR    Collection Time: 12/08/22  9:25 AM    Specimen: Blood   Result Value Ref Range    INR 3.50 (A) 2 - 3   POC INR    Collection Time: 12/15/22 10:23 AM    Specimen: Blood   Result Value Ref Range    INR 3.00 (A) 0.9 - 2     Assessment & Plan   Diagnoses and all orders for this visit:    1. Bilateral lower extremity edema (Primary)    2. Acute diastolic CHF (congestive heart failure) (HCC)    3. Essential hypertension    4. Stage 3 chronic kidney disease, unspecified whether stage 3a or 3b CKD (HCC)    5. Ischemic cardiomyopathy    6. Paroxysmal atrial fibrillation (HCC)    7. Chronic anticoagulation    Discussed with patient and his wife at length concerning his recent increase in swelling and some shortness of breath that is not severe.  He does not want to go to the hospital for admission or emergency room.  We discussed that it does appear that he has some congestive heart failure with the swelling of the fluid in the lungs.  Recommend increasing Lasix to 40 mg twice a day at least for the next few days monitoring the blood pressure and the daily weights.  We understand that this can increase his risk for sudden kidney issues for which he is standing.  If he has any worsening problems, chest pain, shortness of breath is to  go emergency room.           · COVID-19 Precautions - Patient was compliant in wearing a mask. When I saw the patient, I used appropriate personal protective equipment (PPE) including mask and eye shield (standard procedure).  Additionally, I used gown and gloves if indicated.  Hand hygiene was completed before and after seeing the patient.  · Dictated utilizing Dragon Dictation

## 2022-12-29 ENCOUNTER — CLINICAL SUPPORT (OUTPATIENT)
Dept: FAMILY MEDICINE CLINIC | Facility: CLINIC | Age: 77
End: 2022-12-29
Payer: MEDICARE

## 2022-12-29 ENCOUNTER — TELEPHONE (OUTPATIENT)
Dept: FAMILY MEDICINE CLINIC | Facility: CLINIC | Age: 77
End: 2022-12-29

## 2022-12-29 DIAGNOSIS — I63.411 CEREBROVASCULAR ACCIDENT (CVA) DUE TO EMBOLISM OF RIGHT MIDDLE CEREBRAL ARTERY: Primary | ICD-10-CM

## 2022-12-29 DIAGNOSIS — R30.0 DYSURIA: ICD-10-CM

## 2022-12-29 LAB
BILIRUB BLD-MCNC: NEGATIVE MG/DL
CLARITY, POC: CLEAR
COLOR UR: ABNORMAL
EXPIRATION DATE: ABNORMAL
GLUCOSE UR STRIP-MCNC: NEGATIVE MG/DL
INR PPP: 3.3 (ref 0.9–1.1)
KETONES UR QL: NEGATIVE
LEUKOCYTE EST, POC: NEGATIVE
Lab: ABNORMAL
NITRITE UR-MCNC: NEGATIVE MG/ML
PH UR: 5.5 [PH] (ref 5–8)
PROT UR STRIP-MCNC: ABNORMAL MG/DL
RBC # UR STRIP: NEGATIVE /UL
SP GR UR: 1.02 (ref 1–1.03)
UROBILINOGEN UR QL: NORMAL

## 2022-12-29 PROCEDURE — 85610 PROTHROMBIN TIME: CPT | Performed by: INTERNAL MEDICINE

## 2022-12-29 PROCEDURE — 36416 COLLJ CAPILLARY BLOOD SPEC: CPT | Performed by: INTERNAL MEDICINE

## 2022-12-29 PROCEDURE — 81003 URINALYSIS AUTO W/O SCOPE: CPT | Performed by: NURSE PRACTITIONER

## 2022-12-29 NOTE — TELEPHONE ENCOUNTER
Reviewed UA results which were negative for any signs of UTI.  Discussed with patient's emergency contact that patient should hold his dose of warfarin today and restart tomorrow to help get INR level back to normal.  Encouraged contact to have patient eat whenever he is hungry for and to not focus so hard on any particular diet or getting in vitamin K rich foods at this time.  Also discussed that it is very important to keep patient hydrated as much as possible.  Still recommended that patient be seen in ER, emergency contact stated that she has been trying along with patient's wife to get him to go to an ER but he still refuses.

## 2022-12-29 NOTE — TELEPHONE ENCOUNTER
The patient came in for his INR. When his wife came in to check the patient in, she informed Bridget that he is very weak and wouldn't be able to walk into the office. She said she believed he may have had another stroke. She was advised to take him to the ER but she declined because the patient refuses to go. She said he is scared dying there if he goes in. Re took his INR from the car while PASTOR Childress spoke with his wife inside. She again tried to convince the patient's wife to take him to the ER or to have us call EMS. But the patient's wife again refused. The patient's INR was 3.3 and his oxygen was 88-93. He was extremely confused and has had very little kidney output. So Emilyjacoby gave his wife a urine sample cup to try to get a sample at home and then bring it back to the office. His wife was told that if that comes back negative that he would have to go to the ER, regardless of if he would fight the EMT's or not. She verbalized understanding.

## 2022-12-29 NOTE — TELEPHONE ENCOUNTER
Caller: MATTDONAL    Relationship: Emergency Contact    Best call back number: [phone]    What is the best time to reach you: ANY TIME    Who are you requesting to speak with (clinical staff, provider,  specific staff member): DR. MARTIN    What was the call regarding: STATES PATIENT IS HAVING CHEST CONGESTION/MUCUS THAT HE IS TRYING TO COUGH UP AND SHE WANTS TO KNOW WHAT OVER THE COUNTER MEDICATION HE CAN TAKE TO HELP. HE IS ON DIFFERENT MEDICATIONS AND HAS HIGH BLOOD PRESSURE SO SHE WANTS TO MAKE SURE WHATEVER HE TAKES WILL NOT INTERFERE WITH HIS CURRENT MEDICATIONS.    ALSO, SHE IS CONCERNED ABOUT HIS LEGS SWELLING AND FLUID BUILD UP SO THAT'S WHY SHE IS WANTING TO MAKE SURE SHE GETS THE CORRECT MEDICATION FOR HIM. THE NURSE ON OFFICE TOLD PATIENT HE NEEDS TO GO TO THE ED BUT HE IS REFUSING TO GO.    PLEASE ADVISE    Do you require a callback: YES

## 2023-01-01 ENCOUNTER — TELEPHONE (OUTPATIENT)
Dept: FAMILY MEDICINE CLINIC | Facility: CLINIC | Age: 78
End: 2023-01-01
Payer: MEDICARE

## 2023-01-01 ENCOUNTER — TELEPHONE (OUTPATIENT)
Dept: FAMILY MEDICINE CLINIC | Facility: CLINIC | Age: 78
End: 2023-01-01

## 2023-01-01 ENCOUNTER — PATIENT OUTREACH (OUTPATIENT)
Dept: CASE MANAGEMENT | Facility: OTHER | Age: 78
End: 2023-01-01
Payer: MEDICARE

## 2023-01-01 ENCOUNTER — REFERRAL TRIAGE (OUTPATIENT)
Dept: CASE MANAGEMENT | Facility: OTHER | Age: 78
End: 2023-01-01
Payer: MEDICARE

## 2023-01-01 DIAGNOSIS — M79.89 LEG SWELLING: ICD-10-CM

## 2023-01-01 DIAGNOSIS — I25.118 CORONARY ARTERY DISEASE OF NATIVE ARTERY OF NATIVE HEART WITH STABLE ANGINA PECTORIS: ICD-10-CM

## 2023-01-01 DIAGNOSIS — N18.30 STAGE 3 CHRONIC KIDNEY DISEASE, UNSPECIFIED WHETHER STAGE 3A OR 3B CKD: Primary | ICD-10-CM

## 2023-01-01 DIAGNOSIS — I63.411 CEREBROVASCULAR ACCIDENT (CVA) DUE TO EMBOLISM OF RIGHT MIDDLE CEREBRAL ARTERY: ICD-10-CM

## 2023-01-01 DIAGNOSIS — I48.0 PAROXYSMAL ATRIAL FIBRILLATION: ICD-10-CM

## 2023-01-01 DIAGNOSIS — I10 ESSENTIAL HYPERTENSION: ICD-10-CM

## 2023-01-01 DIAGNOSIS — E11.59 TYPE 2 DIABETES MELLITUS WITH OTHER CIRCULATORY COMPLICATION, WITHOUT LONG-TERM CURRENT USE OF INSULIN: Chronic | ICD-10-CM

## 2023-01-01 DIAGNOSIS — I50.9 CONGESTIVE HEART FAILURE, UNSPECIFIED HF CHRONICITY, UNSPECIFIED HEART FAILURE TYPE: ICD-10-CM

## 2023-01-01 DIAGNOSIS — Z79.01 CHRONIC ANTICOAGULATION: ICD-10-CM

## 2023-01-01 DIAGNOSIS — I63.411 CEREBROVASCULAR ACCIDENT (CVA) DUE TO EMBOLISM OF RIGHT MIDDLE CEREBRAL ARTERY: Primary | ICD-10-CM

## 2023-01-01 DIAGNOSIS — I25.10 CHRONIC CORONARY ARTERY DISEASE: ICD-10-CM

## 2023-01-01 DIAGNOSIS — N18.30 STAGE 3 CHRONIC KIDNEY DISEASE, UNSPECIFIED WHETHER STAGE 3A OR 3B CKD: ICD-10-CM

## 2023-01-01 DIAGNOSIS — I25.5 ISCHEMIC CARDIOMYOPATHY: ICD-10-CM

## 2023-01-09 ENCOUNTER — OFFICE VISIT (OUTPATIENT)
Dept: FAMILY MEDICINE CLINIC | Facility: CLINIC | Age: 78
End: 2023-01-09
Payer: MEDICARE

## 2023-01-09 VITALS
DIASTOLIC BLOOD PRESSURE: 80 MMHG | BODY MASS INDEX: 26.4 KG/M2 | OXYGEN SATURATION: 97 % | SYSTOLIC BLOOD PRESSURE: 128 MMHG | HEIGHT: 71 IN | HEART RATE: 81 BPM | WEIGHT: 188.6 LBS

## 2023-01-09 DIAGNOSIS — I25.10 CHRONIC CORONARY ARTERY DISEASE: ICD-10-CM

## 2023-01-09 DIAGNOSIS — I48.0 PAROXYSMAL ATRIAL FIBRILLATION: ICD-10-CM

## 2023-01-09 DIAGNOSIS — N18.30 STAGE 3 CHRONIC KIDNEY DISEASE, UNSPECIFIED WHETHER STAGE 3A OR 3B CKD: ICD-10-CM

## 2023-01-09 DIAGNOSIS — F41.9 ANXIETY AND DEPRESSION: ICD-10-CM

## 2023-01-09 DIAGNOSIS — E11.59 TYPE 2 DIABETES MELLITUS WITH OTHER CIRCULATORY COMPLICATION, WITHOUT LONG-TERM CURRENT USE OF INSULIN: Chronic | ICD-10-CM

## 2023-01-09 DIAGNOSIS — R19.09 UPPER ABDOMINAL MASS: ICD-10-CM

## 2023-01-09 DIAGNOSIS — Z79.01 CHRONIC ANTICOAGULATION: ICD-10-CM

## 2023-01-09 DIAGNOSIS — I63.411 CEREBROVASCULAR ACCIDENT (CVA) DUE TO EMBOLISM OF RIGHT MIDDLE CEREBRAL ARTERY: Primary | ICD-10-CM

## 2023-01-09 DIAGNOSIS — I10 ESSENTIAL HYPERTENSION: ICD-10-CM

## 2023-01-09 DIAGNOSIS — M79.89 LEG SWELLING: ICD-10-CM

## 2023-01-09 DIAGNOSIS — F32.A ANXIETY AND DEPRESSION: ICD-10-CM

## 2023-01-09 DIAGNOSIS — R93.5 ABNORMAL FINDINGS ON DIAGNOSTIC IMAGING OF OTHER ABDOMINAL REGIONS, INCLUDING RETROPERITONEUM: ICD-10-CM

## 2023-01-09 PROBLEM — Z28.21 INFLUENZA VACCINATION DECLINED: Status: ACTIVE | Noted: 2023-01-09

## 2023-01-09 PROBLEM — Z28.21 COVID-19 VACCINATION DECLINED: Status: ACTIVE | Noted: 2023-01-09

## 2023-01-09 PROBLEM — Z28.21 PNEUMOCOCCAL VACCINATION DECLINED: Status: ACTIVE | Noted: 2023-01-09

## 2023-01-09 LAB
INR PPP: 3.3 (ref 0.9–1.1)
INR PPP: 3.3 (ref 2–3)

## 2023-01-09 PROCEDURE — 99215 OFFICE O/P EST HI 40 MIN: CPT | Performed by: INTERNAL MEDICINE

## 2023-01-09 PROCEDURE — 36416 COLLJ CAPILLARY BLOOD SPEC: CPT | Performed by: INTERNAL MEDICINE

## 2023-01-09 PROCEDURE — 85610 PROTHROMBIN TIME: CPT | Performed by: INTERNAL MEDICINE

## 2023-01-09 RX ORDER — RISPERIDONE 0.25 MG/1
0.25 TABLET ORAL NIGHTLY
Qty: 30 TABLET | Refills: 3 | Status: SHIPPED | OUTPATIENT
Start: 2023-01-09 | End: 2023-02-16

## 2023-01-09 NOTE — PROGRESS NOTES
Subjective   Conor Faustin is a 77 y.o. male.     Chief Complaint   Patient presents with   • Hypertension     INR check   • Diabetes       History of Present Illness   Wife and daughter were present during the history-taking and subsequent discussion (and for part of the physical exam) with this patient.  Patient agrees to the presence of the individual during this visit.     Patient with history of coronary artery disease with prior CABG and aortic valve replacement.  Not have abdominal CT in the past with abdominal aortic aneurysm 5.2 cm and severe stenosis of the origin left renal artery.  He was noted to have soft tissue mass of the left upper abdomen 4.4 x 2.3 cm suspicious for neoplasm.  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  Still having swelling in the legs and using compression stockings but now with swelling above the stocking-knee and above.  Has bee taking the lasix once a day only.  Has self discontinued the zetia.    INR evaluation.  Currently anticoagulated with warfarin for atrial fib and s/p CVA.  The patient is taking warfarin 5 mg every day except for Monday and Friday 7.5 mg.  The current INR goal is 2-3.  The INR is currently 3.3.  No significant interval events, easy bleeding, bruising, epistaxis, fever, weakness or numbness.       History of type 2 diabetes with last A1c performed on 9/1/2022 of 8.7% and 3/12/2022 of 7.3%.  He is currently on no medications for this.  He does not check his blood sugars at home.  Patient was given jardiance but was to expensive.    Patient with boil on base of sacrum present for the last 2 weeks and has been putting antibacterial ointment and zinc paste.  Is improving.  Still some sore but better.    Patient has been having some anxiety, difficulty sleeping and short fuse with family.    The following portions of the patient's history were reviewed and updated as appropriate: allergies, current medications, past  family history, past medical history, past social history, past surgical history and problem list.    Depression Screen:  No flowsheet data found.    Past Medical History:   Diagnosis Date   • AAA (abdominal aortic aneurysm)    • Benign essential hypertension    • COPD (chronic obstructive pulmonary disease) ([facility])    • Coronary artery disease    • Renal cyst     RIGHT   • Smoker     1 PPD   • Stroke ([facility])    • Type 2 diabetes mellitus with circulatory disorder, without long-term current use of insulin ([facility]) 2022       Past Surgical History:   Procedure Laterality Date   • AORTIC VALVE REPAIR/REPLACEMENT     • CARDIAC CATHETERIZATION     • CARDIAC CATHETERIZATION N/A 2016    Procedure: Left Heart Cath;  Surgeon: Irena Hale MD;  Location: [facility] CATH INVASIVE LOCATION;  Service:    • CARDIAC CATHETERIZATION     • CORONARY ANGIOPLASTY     • CORONARY ARTERY BYPASS GRAFT N/A 2017    Procedure: INTRAOPERATIVE THIAGO, MIDLINE STERNOTOMY, CORONARY ARTERY BYPASS GRAFTING X 4 UTILIZING LEFT INTERNAL MAMMARY ARTERY AND RIGHT ENDOSCOPICALLY HARVESTED GREATER SAPHENOUS VEIN, AORTIC VALVE REPLACEMENT;  Surgeon: Jassi De Jesus MD;  Location: [facility] OR;  Service:    • PERIPHERAL ARTERIAL STENT GRAFT         Family History   Problem Relation Age of Onset   • No Known Problems Mother    • No Known Problems Father    • No Known Problems Sister    • No Known Problems Brother    • No Known Problems Maternal Aunt    • No Known Problems Maternal Uncle    • No Known Problems Paternal Aunt    • No Known Problems Paternal Uncle    • No Known Problems Maternal Grandmother    • No Known Problems Maternal Grandfather    • No Known Problems Paternal Grandfather        Social History     Socioeconomic History   • Marital status:    Tobacco Use   • Smoking status: Former     Packs/day: 1.00     Years: 64.00     Pack years: 64.00     Types: Cigarettes     Quit date: 3/9/2022     Years since quittin.8   •  Smokeless tobacco: Never   Vaping Use   • Vaping Use: Never used   Substance and Sexual Activity   • Alcohol use: No   • Drug use: No   • Sexual activity: Defer       Current Outpatient Medications   Medication Sig Dispense Refill   • Accu-Chek FastClix Lancets misc 1 each by Other route 4 (Four) Times a Day. 102 each 4   • Accu-Chek Guide test strip TEST BLOOD SUGAR FOUR TIMES DAILY 100 each 3   • B Complex Vitamins (VITAMIN B COMPLEX PO) Take  by mouth.     • Blood Glucose Monitoring Suppl (Accu-Chek Guide) w/Device kit FPD     • Coenzyme Q10 (CO Q 10 PO) Take  by mouth.     • ezetimibe (ZETIA) 10 MG tablet TAKE 1 TABLET BY MOUTH DAILY 30 tablet 4   • furosemide (LASIX) 40 MG tablet Take 1 tablet by mouth 2 (Two) Times a Day. (Patient taking differently: Take 40 mg by mouth Daily. Every morning) 180 tablet 1   • metoprolol succinate XL (TOPROL-XL) 25 MG 24 hr tablet Take 1 tablet by mouth Daily. 90 tablet 3   • nitroglycerin (NITROSTAT) 0.4 MG SL tablet Place 1 tablet under the tongue Every 5 (Five) Minutes As Needed for Chest Pain. Take no more than 3 doses in 15 minutes. 25 tablet 0   • VITAMIN D PO Take  by mouth.     • warfarin (COUMADIN) 1 MG tablet TAKE 1 TABLET BY MOUTH DAILY. ADD TO CURRENT THERAPY 30 tablet 1   • warfarin (COUMADIN) 5 MG tablet Take 5 mg by mouth Daily. Su/Tu/W/Th/Sat     • warfarin (COUMADIN) 7.5 MG tablet TAKE 1 TABLET BY MOUTH AS DIRECTED 90 tablet 0   • potassium chloride ER (K-TAB) 20 MEQ tablet controlled-release ER tablet Take 1 tablet by mouth Daily. 30 tablet 3   • risperiDONE (RisperDAL) 0.25 MG tablet Take 1 tablet by mouth Every Night. 30 tablet 3     No current facility-administered medications for this visit.       Review of Systems   Constitutional: Negative for activity change, appetite change, fatigue, fever, unexpected weight gain and unexpected weight loss.   HENT: Negative for nosebleeds, rhinorrhea, trouble swallowing and voice change.    Eyes: Negative for visual  disturbance.   Respiratory: Negative for cough, chest tightness, shortness of breath and wheezing.    Cardiovascular: Negative for chest pain, palpitations and leg swelling.   Gastrointestinal: Negative for abdominal pain, blood in stool, constipation, diarrhea, nausea, vomiting, GERD and indigestion.   Genitourinary: Negative for dysuria, frequency and hematuria.   Musculoskeletal: Negative for arthralgias, back pain and myalgias.   Skin: Negative for rash and wound.        Sore on the right shin and in the buttock region near the \"rectum\"   Neurological: Negative for dizziness, tremors, weakness, light-headedness, numbness, headache and memory problem.   Hematological: Negative for adenopathy. Does not bruise/bleed easily.   Psychiatric/Behavioral: Negative for sleep disturbance and depressed mood. The patient is not nervous/anxious.        Objective   /80 (BP Location: Right arm, Patient Position: Sitting, Cuff Size: Adult)   Pulse 81   Ht 180.3 cm (71\")   Wt 85.5 kg (188 lb 9.6 oz)   SpO2 97%   BMI 26.30 kg/m²     Physical Exam  Vitals and nursing note reviewed.   Constitutional:       General: He is not in acute distress.     Appearance: He is well-developed. He is not diaphoretic.   HENT:      Head: Normocephalic and atraumatic.      Right Ear: External ear normal.      Left Ear: External ear normal.      Nose: Nose normal.   Eyes:      Conjunctiva/sclera: Conjunctivae normal.      Pupils: Pupils are equal, round, and reactive to light.   Neck:      Thyroid: No thyromegaly.      Trachea: No tracheal deviation.   Cardiovascular:      Rate and Rhythm: Normal rate and regular rhythm.      Heart sounds: Normal heart sounds. No murmur heard.    No friction rub. No gallop.   Pulmonary:      Effort: Pulmonary effort is normal. No respiratory distress.      Breath sounds: Normal breath sounds.   Abdominal:      General: Bowel sounds are normal.      Palpations: Abdomen is soft. There is no mass.       Tenderness: There is no abdominal tenderness. There is no guarding.   Musculoskeletal:         General: Normal range of motion.      Cervical back: Normal range of motion and neck supple.      Right lower leg: Edema present.      Left lower leg: Edema present.      Comments: Bilateral lower extremity swelling and using compression stockings.   Lymphadenopathy:      Cervical: No cervical adenopathy.   Skin:     General: Skin is warm and dry.      Capillary Refill: Capillary refill takes less than 2 seconds.      Findings: No rash.      Comments: Sacral skin scaring on the left side with no open lesion or drainage.  Right leg wound with no drainage.   Neurological:      Mental Status: He is alert and oriented to person, place, and time.      Motor: No abnormal muscle tone.      Deep Tendon Reflexes: Reflexes normal.   Psychiatric:         Behavior: Behavior normal.         Thought Content: Thought content normal.         Judgment: Judgment normal.       Recent Results (from the past 2016 hour(s))   POC INR    Collection Time: 10/27/22  8:29 AM    Specimen: Blood   Result Value Ref Range    INR 1.90 (A) 2 - 3   POC INR    Collection Time: 11/10/22  9:25 AM    Specimen: Blood   Result Value Ref Range    INR 2.30 2 - 3   POC INR    Collection Time: 12/08/22  9:25 AM    Specimen: Blood   Result Value Ref Range    INR 3.50 (A) 2 - 3   POC INR    Collection Time: 12/15/22 10:23 AM    Specimen: Blood   Result Value Ref Range    INR 3.00 (A) 0.9 - 2   POC INR    Collection Time: 12/29/22 10:43 AM    Specimen: Blood   Result Value Ref Range    INR 3.30 (A) 0.9 - 1.1   POCT urinalysis dipstick, automated    Collection Time: 12/29/22 12:25 PM    Specimen: Urine   Result Value Ref Range    Color Dark Yellow Yellow, Straw, Dark Yellow, Jessi    Clarity, UA Clear Clear    Specific Gravity  1.025 1.005 - 1.030    pH, Urine 5.5 5.0 - 8.0    Leukocytes Negative Negative    Nitrite, UA Negative Negative    Protein, POC 2+ (A) Negative  mg/dL    Glucose, UA Negative Negative mg/dL    Ketones, UA Negative Negative    Urobilinogen, UA Normal Normal, 0.2 E.U./dL    Bilirubin Negative Negative    Blood, UA Negative Negative    Lot Number [id number]     Expiration Date 01/08/2023    POC INR    Collection Time: 01/09/23 12:00 AM    Specimen: Blood   Result Value Ref Range    INR 3.30 (A) 0.9 - 1.1   CBC & Differential    Collection Time: 01/09/23 11:37 AM    Specimen: Blood   Result Value Ref Range    WBC 8.88 3.40 - 10.80 10*3/mm3    RBC 5.41 4.14 - 5.80 10*6/mm3    Hemoglobin 14.8 13.0 - 17.7 g/dL    Hematocrit 43.4 37.5 - 51.0 %    MCV 80.2 79.0 - 97.0 fL    MCH 27.4 26.6 - 33.0 pg    MCHC 34.1 31.5 - 35.7 g/dL    RDW 14.3 12.3 - 15.4 %    Platelets 192 140 - 450 10*3/mm3    Neutrophil Rel % 82.4 (H) 42.7 - 76.0 %    Lymphocyte Rel % 7.5 (L) 19.6 - 45.3 %    Monocyte Rel % 8.3 5.0 - 12.0 %    Eosinophil Rel % 0.9 0.3 - 6.2 %    Basophil Rel % 0.6 0.0 - 1.5 %    Neutrophils Absolute 7.31 (H) 1.70 - 7.00 10*3/mm3    Lymphocytes Absolute 0.67 (L) 0.70 - 3.10 10*3/mm3    Monocytes Absolute 0.74 0.10 - 0.90 10*3/mm3    Eosinophils Absolute 0.08 0.00 - 0.40 10*3/mm3    Basophils Absolute 0.05 0.00 - 0.20 10*3/mm3    Immature Granulocyte Rel % 0.3 0.0 - 0.5 %    Immature Grans Absolute 0.03 0.00 - 0.05 10*3/mm3    nRBC 0.0 0.0 - 0.2 /100 WBC   Comprehensive Metabolic Panel    Collection Time: 01/09/23 11:37 AM    Specimen: Blood   Result Value Ref Range    Glucose 145 (H) 65 - 99 mg/dL    BUN 19 8 - 23 mg/dL    Creatinine 1.77 (H) 0.76 - 1.27 mg/dL    EGFR Result 39.3 (L) >60.0 mL/min/1.73    BUN/Creatinine Ratio 10.7 7.0 - 25.0    Sodium 138 136 - 145 mmol/L    Potassium 3.1 (L) 3.5 - 5.2 mmol/L    Chloride 95 (L) 98 - 107 mmol/L    Total CO2 30.8 (H) 22.0 - 29.0 mmol/L    Calcium 8.9 8.6 - 10.5 mg/dL    Total Protein 6.5 6.0 - 8.5 g/dL    Albumin 4.1 3.5 - 5.2 g/dL    Globulin 2.4 gm/dL    A/G Ratio 1.7 g/dL    Total Bilirubin 1.0 0.0 - 1.2 mg/dL     Alkaline Phosphatase 104 39 - 117 U/L    AST (SGOT) 23 1 - 40 U/L    ALT (SGPT) 19 1 - 41 U/L   Hemoglobin A1c    Collection Time: 01/09/23 11:37 AM    Specimen: Blood   Result Value Ref Range    Hemoglobin A1C 7.30 (H) 4.80 - 5.60 %   POC INR    Collection Time: 01/09/23  1:34 PM    Specimen: Blood   Result Value Ref Range    INR 3.30 (A) 2 - 3     Assessment & Plan   Diagnoses and all orders for this visit:    1. Cerebrovascular accident (CVA) due to embolism of right middle cerebral artery (HCC) (Primary)  -     POC INR  -     POC INR    2. Essential hypertension  -     CBC & Differential  -     Comprehensive Metabolic Panel  -     POC INR  -     POC INR    3. Chronic coronary artery disease  -     CBC & Differential  -     Comprehensive Metabolic Panel  -     POC INR  -     POC INR    4. Paroxysmal atrial fibrillation (HCC)  -     CBC & Differential  -     POC INR  -     POC INR    5. Type 2 diabetes mellitus with other circulatory complication, without long-term current use of insulin (HCC)  -     Comprehensive Metabolic Panel  -     Hemoglobin A1c  -     POC INR  -     POC INR    6. Chronic anticoagulation  -     POC INR  -     POC INR    7. Stage 3 chronic kidney disease, unspecified whether stage 3a or 3b CKD (HCC)  -     CBC & Differential  -     Comprehensive Metabolic Panel  -     POC INR  -     POC INR    8. Anxiety and depression  -     risperiDONE (RisperDAL) 0.25 MG tablet; Take 1 tablet by mouth Every Night.  Dispense: 30 tablet; Refill: 3  -     POC INR  -     POC INR    9. Leg swelling  -     Comprehensive Metabolic Panel  -     POC INR  -     POC INR    10. Upper abdominal mass  -     Cancel: NM PET/CT Skull Base to Mid Thigh; Future  -     POC INR  -     POC INR    11. Abnormal findings on diagnostic imaging of other abdominal regions, including retroperitoneum  -     Cancel: NM PET/CT Skull Base to Mid Thigh; Future  -     POC INR  -     POC INR    Recommend increasing the lasix to 2 tabs 3 days  a week M,W, and F.  Check daily weights.  Compression stockings above the knee.  Elevate the legs the best he can at home.  Discussed the anxiety and mood issue with anger outburst.  Recommend starting med such as respiridone at night to help sleep and mood.  Hold warfarin for 2 days then 5 mg every day except Fridays 7.5 mg.  Will try to to get home INR machine.  Recommended to go back to taking the zetia daily and discussed other options that are not statins such as injectable meds but patient declines.  Discussed the past Abdomen scan with the upper abdomen mass.  Note the PET scan was never completed and patient actually declined and refused evaluation.  He is agreeable to the PET scan now.  Then after discussion further patient then declines and refuses the scan.  History of diabetes that is uncontrolled and will check the A1c.  If still elevated then consider addition of medication but will be limited secondary to cost.  Medihoney to the leg wound and any wounds.  Past decubitus ulcer appears to be healed and only some scar tissue is present.  No open lesions or sores are profound.  Symptomatic treatment avoid any prolonged pressure to the buttock region.  Very long discussion was held with family and the patient.  All were educated concerning his care.  All were understanding that the patient adamantly refuses the PET scan or further evaluation for the abdominal mass and date and he understand the risks of not treating this.  I spent 59 minutes caring for Conor on this date of service. This time includes time spent by me in the following activities:preparing for the visit, reviewing tests, obtaining and/or reviewing a separately obtained history, performing a medically appropriate examination and/or evaluation , counseling and educating the patient/family/caregiver, ordering medications, tests, or procedures and documenting information in the medical record     · COVID-19 Precautions - Patient was compliant  in wearing a mask. When I saw the patient, I used appropriate personal protective equipment (PPE) including mask and eye shield (standard procedure).  Additionally, I used gown and gloves if indicated.  Hand hygiene was completed before and after seeing the patient.  · Dictated utilizing Dragon Dictation

## 2023-01-10 DIAGNOSIS — E87.6 HYPOKALEMIA: Primary | ICD-10-CM

## 2023-01-10 LAB
ALBUMIN SERPL-MCNC: 4.1 G/DL (ref 3.5–5.2)
ALBUMIN/GLOB SERPL: 1.7 G/DL
ALP SERPL-CCNC: 104 U/L (ref 39–117)
ALT SERPL-CCNC: 19 U/L (ref 1–41)
AST SERPL-CCNC: 23 U/L (ref 1–40)
BASOPHILS # BLD AUTO: 0.05 10*3/MM3 (ref 0–0.2)
BASOPHILS NFR BLD AUTO: 0.6 % (ref 0–1.5)
BILIRUB SERPL-MCNC: 1 MG/DL (ref 0–1.2)
BUN SERPL-MCNC: 19 MG/DL (ref 8–23)
BUN/CREAT SERPL: 10.7 (ref 7–25)
CALCIUM SERPL-MCNC: 8.9 MG/DL (ref 8.6–10.5)
CHLORIDE SERPL-SCNC: 95 MMOL/L (ref 98–107)
CO2 SERPL-SCNC: 30.8 MMOL/L (ref 22–29)
CREAT SERPL-MCNC: 1.77 MG/DL (ref 0.76–1.27)
EGFRCR SERPLBLD CKD-EPI 2021: 39.3 ML/MIN/1.73
EOSINOPHIL # BLD AUTO: 0.08 10*3/MM3 (ref 0–0.4)
EOSINOPHIL NFR BLD AUTO: 0.9 % (ref 0.3–6.2)
ERYTHROCYTE [DISTWIDTH] IN BLOOD BY AUTOMATED COUNT: 14.3 % (ref 12.3–15.4)
GLOBULIN SER CALC-MCNC: 2.4 GM/DL
GLUCOSE SERPL-MCNC: 145 MG/DL (ref 65–99)
HBA1C MFR BLD: 7.3 % (ref 4.8–5.6)
HCT VFR BLD AUTO: 43.4 % (ref 37.5–51)
HGB BLD-MCNC: 14.8 G/DL (ref 13–17.7)
IMM GRANULOCYTES # BLD AUTO: 0.03 10*3/MM3 (ref 0–0.05)
IMM GRANULOCYTES NFR BLD AUTO: 0.3 % (ref 0–0.5)
LYMPHOCYTES # BLD AUTO: 0.67 10*3/MM3 (ref 0.7–3.1)
LYMPHOCYTES NFR BLD AUTO: 7.5 % (ref 19.6–45.3)
MCH RBC QN AUTO: 27.4 PG (ref 26.6–33)
MCHC RBC AUTO-ENTMCNC: 34.1 G/DL (ref 31.5–35.7)
MCV RBC AUTO: 80.2 FL (ref 79–97)
MONOCYTES # BLD AUTO: 0.74 10*3/MM3 (ref 0.1–0.9)
MONOCYTES NFR BLD AUTO: 8.3 % (ref 5–12)
NEUTROPHILS # BLD AUTO: 7.31 10*3/MM3 (ref 1.7–7)
NEUTROPHILS NFR BLD AUTO: 82.4 % (ref 42.7–76)
NRBC BLD AUTO-RTO: 0 /100 WBC (ref 0–0.2)
PLATELET # BLD AUTO: 192 10*3/MM3 (ref 140–450)
POTASSIUM SERPL-SCNC: 3.1 MMOL/L (ref 3.5–5.2)
PROT SERPL-MCNC: 6.5 G/DL (ref 6–8.5)
RBC # BLD AUTO: 5.41 10*6/MM3 (ref 4.14–5.8)
SODIUM SERPL-SCNC: 138 MMOL/L (ref 136–145)
WBC # BLD AUTO: 8.88 10*3/MM3 (ref 3.4–10.8)

## 2023-01-10 RX ORDER — POTASSIUM CHLORIDE 1500 MG/1
20 TABLET, FILM COATED, EXTENDED RELEASE ORAL DAILY
Qty: 30 TABLET | Refills: 3 | Status: SHIPPED | OUTPATIENT
Start: 2023-01-10

## 2023-01-12 NOTE — TELEPHONE ENCOUNTER
Caller: MATTDONAL    Relationship: Emergency Contact    Best call back number: [phone]     What medications are you currently taking: potassium chloride ER (K-TAB) 20 MEQ tablet controlled-release ER tablet     When did you start taking these medications: 01/09/23    Who prescribed you this medication:     What are your concerns: PATIENTS DAUGHTER CALLING STATING THAT WHEN HE WHO TOOK THE MEDICATION A LITTLE LATER IN THE DAY HE STARTED FEELING WEIRD WITH HIS HEART RACING SHE WOULD LIKE TO KNOW WHAT  WOULD LIKE FOR HIM TO DO     How long have you had these concerns:

## 2023-01-23 ENCOUNTER — ANTICOAGULATION VISIT (OUTPATIENT)
Dept: FAMILY MEDICINE CLINIC | Facility: CLINIC | Age: 78
End: 2023-01-23
Payer: MEDICARE

## 2023-01-23 DIAGNOSIS — I63.411 CEREBROVASCULAR ACCIDENT (CVA) DUE TO EMBOLISM OF RIGHT MIDDLE CEREBRAL ARTERY: Primary | ICD-10-CM

## 2023-01-23 LAB — INR PPP: 3.3 (ref 2–3)

## 2023-01-23 PROCEDURE — 85610 PROTHROMBIN TIME: CPT | Performed by: INTERNAL MEDICINE

## 2023-01-23 PROCEDURE — 36416 COLLJ CAPILLARY BLOOD SPEC: CPT | Performed by: INTERNAL MEDICINE

## 2023-01-25 DIAGNOSIS — I48.0 PAROXYSMAL ATRIAL FIBRILLATION: ICD-10-CM

## 2023-01-25 DIAGNOSIS — Z79.01 CHRONIC ANTICOAGULATION: ICD-10-CM

## 2023-01-25 RX ORDER — WARFARIN SODIUM 7.5 MG/1
TABLET ORAL
Qty: 90 TABLET | Refills: 0 | Status: SHIPPED | OUTPATIENT
Start: 2023-01-25

## 2023-02-07 ENCOUNTER — ANTICOAGULATION VISIT (OUTPATIENT)
Dept: FAMILY MEDICINE CLINIC | Facility: CLINIC | Age: 78
End: 2023-02-07
Payer: MEDICARE

## 2023-02-07 DIAGNOSIS — I63.411 CEREBROVASCULAR ACCIDENT (CVA) DUE TO EMBOLISM OF RIGHT MIDDLE CEREBRAL ARTERY: Primary | ICD-10-CM

## 2023-02-07 LAB — INR PPP: 2.2 (ref 2–3)

## 2023-02-07 PROCEDURE — 36416 COLLJ CAPILLARY BLOOD SPEC: CPT | Performed by: INTERNAL MEDICINE

## 2023-02-07 PROCEDURE — 85610 PROTHROMBIN TIME: CPT | Performed by: INTERNAL MEDICINE

## 2023-02-08 ENCOUNTER — TELEPHONE (OUTPATIENT)
Dept: FAMILY MEDICINE CLINIC | Facility: CLINIC | Age: 78
End: 2023-02-08
Payer: MEDICARE

## 2023-02-14 RX ORDER — FUROSEMIDE 40 MG/1
TABLET ORAL
Qty: 180 TABLET | Refills: 1 | Status: SHIPPED | OUTPATIENT
Start: 2023-02-14

## 2023-02-16 ENCOUNTER — OFFICE VISIT (OUTPATIENT)
Dept: FAMILY MEDICINE CLINIC | Facility: CLINIC | Age: 78
End: 2023-02-16
Payer: MEDICARE

## 2023-02-16 VITALS
HEIGHT: 71 IN | WEIGHT: 170.6 LBS | SYSTOLIC BLOOD PRESSURE: 122 MMHG | BODY MASS INDEX: 23.88 KG/M2 | OXYGEN SATURATION: 100 % | HEART RATE: 96 BPM | DIASTOLIC BLOOD PRESSURE: 84 MMHG

## 2023-02-16 DIAGNOSIS — I63.411 CEREBROVASCULAR ACCIDENT (CVA) DUE TO EMBOLISM OF RIGHT MIDDLE CEREBRAL ARTERY: Primary | ICD-10-CM

## 2023-02-16 DIAGNOSIS — R60.0 BILATERAL LEG EDEMA: ICD-10-CM

## 2023-02-16 DIAGNOSIS — L03.116 BILATERAL LOWER LEG CELLULITIS: ICD-10-CM

## 2023-02-16 DIAGNOSIS — L03.115 BILATERAL LOWER LEG CELLULITIS: ICD-10-CM

## 2023-02-16 DIAGNOSIS — S81.802A OPEN WOUND OF BOTH LOWER EXTREMITIES, INITIAL ENCOUNTER: ICD-10-CM

## 2023-02-16 DIAGNOSIS — R39.89 URINARY PROBLEM: ICD-10-CM

## 2023-02-16 DIAGNOSIS — S81.801A OPEN WOUND OF BOTH LOWER EXTREMITIES, INITIAL ENCOUNTER: ICD-10-CM

## 2023-02-16 LAB
BILIRUB BLD-MCNC: ABNORMAL MG/DL
CLARITY, POC: CLEAR
COLOR UR: YELLOW
EXPIRATION DATE: ABNORMAL
GLUCOSE UR STRIP-MCNC: NEGATIVE MG/DL
INR PPP: 2.4 (ref 2–3)
KETONES UR QL: NEGATIVE
LEUKOCYTE EST, POC: NEGATIVE
Lab: ABNORMAL
NITRITE UR-MCNC: NEGATIVE MG/ML
PH UR: 5.5 [PH] (ref 5–8)
PROT UR STRIP-MCNC: ABNORMAL MG/DL
RBC # UR STRIP: NEGATIVE /UL
SP GR UR: 1.03 (ref 1–1.03)
UROBILINOGEN UR QL: NORMAL

## 2023-02-16 PROCEDURE — 85610 PROTHROMBIN TIME: CPT | Performed by: INTERNAL MEDICINE

## 2023-02-16 PROCEDURE — 81003 URINALYSIS AUTO W/O SCOPE: CPT | Performed by: INTERNAL MEDICINE

## 2023-02-16 PROCEDURE — 36416 COLLJ CAPILLARY BLOOD SPEC: CPT | Performed by: INTERNAL MEDICINE

## 2023-02-16 PROCEDURE — 99214 OFFICE O/P EST MOD 30 MIN: CPT | Performed by: INTERNAL MEDICINE

## 2023-02-16 RX ORDER — CEPHALEXIN 500 MG/1
500 CAPSULE ORAL 3 TIMES DAILY
Qty: 30 CAPSULE | Refills: 0 | Status: SHIPPED | OUTPATIENT
Start: 2023-02-16

## 2023-02-16 NOTE — PROGRESS NOTES
"Subjective   Conor Faustin is a 77 y.o. male.     Chief Complaint   Patient presents with   • Leg Swelling     Legs are leaking    • Hypertension       History of Present Illness     Wife and daughter were present during the history-taking and subsequent discussion (and for part of the physical exam) with this patient.  Patient agrees to the presence of the individual during this visit.     Patient with both legs swelling and leaking fluid with pain in the left leg.  Refuses to go to ER and absolutely refuses admission.  Family has been trying to care for the wounds but the blisters have been increasing and developing ulcers now.  Now with \"monstrous blisters have developed.\"  Has had an simone colored urine.  Decreased urine output and some discomfort.    Patient with history of coronary artery disease with prior CABG and aortic valve replacement.  Not have abdominal CT in the past with abdominal aortic aneurysm 5.2 cm and severe stenosis of the origin left renal artery.  He was noted to have soft tissue mass of the left upper abdomen 4.4 x 2.3 cm suspicious for neoplasm.  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  Still having swelling in the legs and using compression stockings but now with swelling above the stocking-knee and above.  Has bee taking the lasix once a day only.  Has self discontinued the zetia.     INR evaluation.  Currently anticoagulated with warfarin for atrial fib and s/p CVA.  The patient is taking warfarin 5 mg every day except for Monday and Friday 7.5 mg.  The current INR goal is 2-3.  The INR is currently 2.4.  No significant interval events, easy bleeding, bruising, epistaxis, fever, weakness or numbness.       History of type 2 diabetes with last A1c performed on 1/9/23 of 7.3%, 9/1/2022 of 8.7% and 3/12/2022 of 7.3%.  He is currently on no medications for this.  He does not check his blood sugars at home.  Patient was given jardiance but was to " expensive.      The following portions of the patient's history were reviewed and updated as appropriate: allergies, current medications, past family history, past medical history, past social history, past surgical history and problem list.    Depression Screen:  No flowsheet data found.    Past Medical History:   Diagnosis Date   • AAA (abdominal aortic aneurysm)    • Benign essential hypertension    • COPD (chronic obstructive pulmonary disease) ([facility])    • Coronary artery disease    • Renal cyst     RIGHT   • Smoker     1 PPD   • Stroke ([facility])    • Type 2 diabetes mellitus with circulatory disorder, without long-term current use of insulin ([facility]) 4/5/2022       Past Surgical History:   Procedure Laterality Date   • AORTIC VALVE REPAIR/REPLACEMENT     • CARDIAC CATHETERIZATION     • CARDIAC CATHETERIZATION N/A 12/12/2016    Procedure: Left Heart Cath;  Surgeon: Irena Hale MD;  Location: [facility] INVASIVE LOCATION;  Service:    • CARDIAC CATHETERIZATION     • CORONARY ANGIOPLASTY     • CORONARY ARTERY BYPASS GRAFT N/A 1/17/2017    Procedure: INTRAOPERATIVE THIAGO, MIDLINE STERNOTOMY, CORONARY ARTERY BYPASS GRAFTING X 4 UTILIZING LEFT INTERNAL MAMMARY ARTERY AND RIGHT ENDOSCOPICALLY HARVESTED GREATER SAPHENOUS VEIN, AORTIC VALVE REPLACEMENT;  Surgeon: Jassi De Jesus MD;  Location: [facility] OR;  Service:    • PERIPHERAL ARTERIAL STENT GRAFT         Family History   Problem Relation Age of Onset   • No Known Problems Mother    • No Known Problems Father    • No Known Problems Sister    • No Known Problems Brother    • No Known Problems Maternal Aunt    • No Known Problems Maternal Uncle    • No Known Problems Paternal Aunt    • No Known Problems Paternal Uncle    • No Known Problems Maternal Grandmother    • No Known Problems Maternal Grandfather    • No Known Problems Paternal Grandfather        Social History     Socioeconomic History   • Marital status:    Tobacco Use   • Smoking status: Former  "    Packs/day: 1.00     Years: 64.00     Pack years: 64.00     Types: Cigarettes     Quit date: 3/9/2022     Years since quittin.9   • Smokeless tobacco: Never   Vaping Use   • Vaping Use: Never used   Substance and Sexual Activity   • Alcohol use: No   • Drug use: No   • Sexual activity: Defer       Current Outpatient Medications   Medication Sig Dispense Refill   • Accu-Chek FastClix Lancets misc 1 each by Other route 4 (Four) Times a Day. 102 each 4   • Accu-Chek Guide test strip TEST BLOOD SUGAR FOUR TIMES DAILY 100 each 3   • B Complex Vitamins (VITAMIN B COMPLEX PO) Take  by mouth.     • Blood Glucose Monitoring Suppl (Accu-Chek Guide) w/Device kit FPD     • Coenzyme Q10 (CO Q 10 PO) Take  by mouth.     • furosemide (LASIX) 40 MG tablet TAKE 1 TABLET BY MOUTH TWICE DAILY 180 tablet 1   • metoprolol succinate XL (TOPROL-XL) 25 MG 24 hr tablet Take 1 tablet by mouth Daily. 90 tablet 3   • nitroglycerin (NITROSTAT) 0.4 MG SL tablet Place 1 tablet under the tongue Every 5 (Five) Minutes As Needed for Chest Pain. Take no more than 3 doses in 15 minutes. 25 tablet 0   • potassium chloride ER (K-TAB) 20 MEQ tablet controlled-release ER tablet Take 1 tablet by mouth Daily. 30 tablet 3   • VITAMIN D PO Take  by mouth.     • warfarin (COUMADIN) 1 MG tablet TAKE 1 TABLET BY MOUTH DAILY. ADD TO CURRENT THERAPY 30 tablet 1   • warfarin (COUMADIN) 5 MG tablet Take 5 mg by mouth Daily. ////Sat     • warfarin (COUMADIN) 7.5 MG tablet TAKE 1 TABLET BY MOUTH AS DIRECTED 90 tablet 0   • ezetimibe (ZETIA) 10 MG tablet TAKE 1 TABLET BY MOUTH DAILY 30 tablet 4   • risperiDONE (RisperDAL) 0.25 MG tablet Take 1 tablet by mouth Every Night. 30 tablet 3     No current facility-administered medications for this visit.       Review of Systems    Objective   /84 (BP Location: Left arm, Patient Position: Sitting, Cuff Size: Adult)   Pulse 96   Ht 180.3 cm (71\")   Wt 77.4 kg (170 lb 9.6 oz)   SpO2 100%   BMI 23.79 " kg/m²     Physical Exam  Vitals and nursing note reviewed.   Constitutional:       General: He is not in acute distress.     Appearance: He is well-developed. He is not diaphoretic.   HENT:      Head: Normocephalic and atraumatic.      Right Ear: External ear normal.      Left Ear: External ear normal.      Nose: Nose normal.   Eyes:      Conjunctiva/sclera: Conjunctivae normal.      Pupils: Pupils are equal, round, and reactive to light.   Neck:      Thyroid: No thyromegaly.      Trachea: No tracheal deviation.   Cardiovascular:      Rate and Rhythm: Normal rate and regular rhythm.      Heart sounds: Normal heart sounds. No murmur heard.    No friction rub. No gallop.   Pulmonary:      Effort: Pulmonary effort is normal. No respiratory distress.      Breath sounds: Normal breath sounds.   Abdominal:      General: Bowel sounds are normal.      Palpations: Abdomen is soft. There is no mass.      Tenderness: There is no abdominal tenderness. There is no guarding.   Musculoskeletal:         General: Normal range of motion.      Cervical back: Normal range of motion and neck supple.   Lymphadenopathy:      Cervical: No cervical adenopathy.   Skin:     General: Skin is warm and dry.      Capillary Refill: Capillary refill takes less than 2 seconds.      Findings: No rash.      Comments: Bilateral legs below the knees with bullous lesions with yellow drainage and redness in the legs and toes left greater than right.  Blisters and drainage in the left greater than right toes.   Neurological:      Mental Status: He is alert and oriented to person, place, and time.      Motor: No abnormal muscle tone.      Deep Tendon Reflexes: Reflexes normal.   Psychiatric:         Behavior: Behavior normal.         Thought Content: Thought content normal.         Judgment: Judgment normal.       Recent Results (from the past 2016 hour(s))   POC INR    Collection Time: 12/08/22  9:25 AM    Specimen: Blood   Result Value Ref Range    INR  3.50 (A) 2 - 3   POC INR    Collection Time: 12/15/22 10:23 AM    Specimen: Blood   Result Value Ref Range    INR 3.00 (A) 0.9 - 2   POC INR    Collection Time: 12/29/22 10:43 AM    Specimen: Blood   Result Value Ref Range    INR 3.30 (A) 0.9 - 1.1   POCT urinalysis dipstick, automated    Collection Time: 12/29/22 12:25 PM    Specimen: Urine   Result Value Ref Range    Color Dark Yellow Yellow, Straw, Dark Yellow, Jessi    Clarity, UA Clear Clear    Specific Gravity  1.025 1.005 - 1.030    pH, Urine 5.5 5.0 - 8.0    Leukocytes Negative Negative    Nitrite, UA Negative Negative    Protein, POC 2+ (A) Negative mg/dL    Glucose, UA Negative Negative mg/dL    Ketones, UA Negative Negative    Urobilinogen, UA Normal Normal, 0.2 E.U./dL    Bilirubin Negative Negative    Blood, UA Negative Negative    Lot Number [id number]     Expiration Date 01/08/2023    POC INR    Collection Time: 01/09/23 12:00 AM    Specimen: Blood   Result Value Ref Range    INR 3.30 (A) 0.9 - 1.1   CBC & Differential    Collection Time: 01/09/23 11:37 AM    Specimen: Blood   Result Value Ref Range    WBC 8.88 3.40 - 10.80 10*3/mm3    RBC 5.41 4.14 - 5.80 10*6/mm3    Hemoglobin 14.8 13.0 - 17.7 g/dL    Hematocrit 43.4 37.5 - 51.0 %    MCV 80.2 79.0 - 97.0 fL    MCH 27.4 26.6 - 33.0 pg    MCHC 34.1 31.5 - 35.7 g/dL    RDW 14.3 12.3 - 15.4 %    Platelets 192 140 - 450 10*3/mm3    Neutrophil Rel % 82.4 (H) 42.7 - 76.0 %    Lymphocyte Rel % 7.5 (L) 19.6 - 45.3 %    Monocyte Rel % 8.3 5.0 - 12.0 %    Eosinophil Rel % 0.9 0.3 - 6.2 %    Basophil Rel % 0.6 0.0 - 1.5 %    Neutrophils Absolute 7.31 (H) 1.70 - 7.00 10*3/mm3    Lymphocytes Absolute 0.67 (L) 0.70 - 3.10 10*3/mm3    Monocytes Absolute 0.74 0.10 - 0.90 10*3/mm3    Eosinophils Absolute 0.08 0.00 - 0.40 10*3/mm3    Basophils Absolute 0.05 0.00 - 0.20 10*3/mm3    Immature Granulocyte Rel % 0.3 0.0 - 0.5 %    Immature Grans Absolute 0.03 0.00 - 0.05 10*3/mm3    nRBC 0.0 0.0 - 0.2 /100 WBC    Comprehensive Metabolic Panel    Collection Time: 01/09/23 11:37 AM    Specimen: Blood   Result Value Ref Range    Glucose 145 (H) 65 - 99 mg/dL    BUN 19 8 - 23 mg/dL    Creatinine 1.77 (H) 0.76 - 1.27 mg/dL    EGFR Result 39.3 (L) >60.0 mL/min/1.73    BUN/Creatinine Ratio 10.7 7.0 - 25.0    Sodium 138 136 - 145 mmol/L    Potassium 3.1 (L) 3.5 - 5.2 mmol/L    Chloride 95 (L) 98 - 107 mmol/L    Total CO2 30.8 (H) 22.0 - 29.0 mmol/L    Calcium 8.9 8.6 - 10.5 mg/dL    Total Protein 6.5 6.0 - 8.5 g/dL    Albumin 4.1 3.5 - 5.2 g/dL    Globulin 2.4 gm/dL    A/G Ratio 1.7 g/dL    Total Bilirubin 1.0 0.0 - 1.2 mg/dL    Alkaline Phosphatase 104 39 - 117 U/L    AST (SGOT) 23 1 - 40 U/L    ALT (SGPT) 19 1 - 41 U/L   Hemoglobin A1c    Collection Time: 01/09/23 11:37 AM    Specimen: Blood   Result Value Ref Range    Hemoglobin A1C 7.30 (H) 4.80 - 5.60 %   POC INR    Collection Time: 01/09/23  1:34 PM    Specimen: Blood   Result Value Ref Range    INR 3.30 (A) 2 - 3   POC INR    Collection Time: 01/23/23 11:14 AM    Specimen: Blood   Result Value Ref Range    INR 3.30 (A) 2 - 3   Basic Metabolic Panel    Collection Time: 01/23/23 11:29 AM    Specimen: Blood   Result Value Ref Range    Glucose 165 (H) 65 - 99 mg/dL    BUN 16 8 - 23 mg/dL    Creatinine 1.80 (H) 0.76 - 1.27 mg/dL    EGFR Result 38.5 (L) >60.0 mL/min/1.73    BUN/Creatinine Ratio 8.9 7.0 - 25.0    Sodium 136 136 - 145 mmol/L    Potassium 4.0 3.5 - 5.2 mmol/L    Chloride 96 (L) 98 - 107 mmol/L    Total CO2 30.4 (H) 22.0 - 29.0 mmol/L    Calcium 9.2 8.6 - 10.5 mg/dL   POC INR    Collection Time: 02/07/23 10:51 AM    Specimen: Blood   Result Value Ref Range    INR 2.20 (A) 2 - 3   POC INR    Collection Time: 02/16/23 12:00 AM    Specimen: Blood   Result Value Ref Range    INR 2.40 (A) 2 - 3   POC Urinalysis Dipstick, Automated    Collection Time: 02/16/23 11:42 AM    Specimen: Urine   Result Value Ref Range    Color Yellow Yellow, Straw, Dark Yellow, Jessi    Clarity,  UA Clear Clear    Specific Gravity  1.030 1.005 - 1.030    pH, Urine 5.5 5.0 - 8.0    Leukocytes Negative Negative    Nitrite, UA Negative Negative    Protein, POC 1+ (A) Negative mg/dL    Glucose, UA Negative Negative mg/dL    Ketones, UA Negative Negative    Urobilinogen, UA Normal Normal, 0.2 E.U./dL    Bilirubin 1 mg/dL (A) Negative    Blood, UA Negative Negative    Lot Number [id number]     Expiration Date 01/08/2023      Assessment & Plan   Diagnoses and all orders for this visit:    1. Cerebrovascular accident (CVA) due to embolism of right middle cerebral artery (HCC) (Primary)  -     POC INR    2. Urinary problem  -     POC Urinalysis Dipstick, Automated    3. Bilateral lower leg cellulitis    4. Bilateral leg edema    5. Open wound of both lower extremities, initial encounter      INR is appropriate.  No change in warfarin dosing.  Repeat INR in 4 weeks.  No sign of UTI on the UA but will be starting the keflex for the leg wounds and to see wound care for the swelling and wound care in a diabetic.  I expressed my concern to the patient, his wife, and his daughter who are present in the room.  Concerned about his leg wound evidence of a cellulitis especially in his left foot and toes in a diabetic which is extremely concerning.  I recommend that we follow-up with the wound specialist ASAP at a minimum with the antibiotic and likely with compression wrappings and wound care.  If he has worsening problems fever chills etc. is to go emergency room.  I did encourage him that this is very concerning and we discussed emergency room and hospitalization but he absolutely refuses at this time despite counseling and understands the risk including death of no action.  Patient does have a history of declining care and services including PET scans CTs hospitalizations etc.         · COVID-19 Precautions - Patient was compliant in wearing a mask. When I saw the patient, I used appropriate personal protective equipment  (PPE) including mask and eye shield (standard procedure).  Additionally, I used gown and gloves if indicated.  Hand hygiene was completed before and after seeing the patient.  · Dictated utilizing Dragon Dictation

## 2023-02-21 ENCOUNTER — TELEPHONE (OUTPATIENT)
Dept: FAMILY MEDICINE CLINIC | Facility: CLINIC | Age: 78
End: 2023-02-21
Payer: MEDICARE

## 2023-02-21 ENCOUNTER — OFFICE VISIT (OUTPATIENT)
Dept: WOUND CARE | Facility: HOSPITAL | Age: 78
End: 2023-02-21
Payer: MEDICARE

## 2023-02-21 PROCEDURE — G0463 HOSPITAL OUTPT CLINIC VISIT: HCPCS

## 2023-02-21 PROCEDURE — 97602 WOUND(S) CARE NON-SELECTIVE: CPT

## 2023-02-21 NOTE — TELEPHONE ENCOUNTER
Patient's daughter stated he was treated at Wound Care but would need his pcp to send in something for pain.

## 2023-02-22 NOTE — TELEPHONE ENCOUNTER
Caller: DONAL GUTIERREZ    Relationship to patient: Emergency Contact    Best call back number: [phone]      Patient is needing: PATIENTS DAUGHTER STATES THAT SHE BOUGHT HIM SOME TYLENOL 650 MG FOR HIS PAIN, BUT WANTS TO MAKE SURE THAT IT IS OK FOR HIM TO TAKE. PLEASE REACH OUT AND ADVISE.

## 2023-02-23 ENCOUNTER — TELEPHONE (OUTPATIENT)
Dept: FAMILY MEDICINE CLINIC | Facility: CLINIC | Age: 78
End: 2023-02-23
Payer: MEDICARE

## 2023-02-23 NOTE — TELEPHONE ENCOUNTER
OK FOR HUB TO READ    Tylenol is an ideal choice.  His liver enzymes have been normal and the Tylenol will not interfere with any of his current medicines nor is blood thinners.  He should definitely stay away from any anti-inflammatories such as Motrin, Advil, Aleve etc.

## 2023-02-24 ENCOUNTER — TELEPHONE (OUTPATIENT)
Dept: FAMILY MEDICINE CLINIC | Facility: CLINIC | Age: 78
End: 2023-02-24
Payer: MEDICARE

## 2023-02-24 NOTE — TELEPHONE ENCOUNTER
[facility] RN called answering service due to family was waiting on a response for pain medication; patient has been getting wound care per home health and has extreme pain during dressing changes; RN currently with patient; note regarding medication for pain reviewed in chart and noted recommendation for Tylenol; patient has not tried Tylenol at this point due to concerns would be a problem with current health and other medications; relayed note from Dr. Cleveland regarding Tylenol would be a good choice and would not interfere with other medications; liver enzymes have been normal; instructed to try Tylenol 1000 mg 3 times daily and see if helps; will notify Dr. Cleveland of patient's concerns.

## 2023-02-24 NOTE — TELEPHONE ENCOUNTER
[facility] staff attempted to follow warm transfer process and was unsuccessful     Caller: Marilee Faustin    Relationship to patient: Emergency Contact    Best call back number: [phone]    Patient is needing: THE PATIENT WILL NEED AN INR SCHEDULED FOR ABOUT 2 WEEKS OUT.

## 2023-02-24 NOTE — TELEPHONE ENCOUNTER
Caller: DONAL GUTIERREZ     Relationship: [unfilled]     Best call back number: [phone]    What is your medical concern? PATIENT'S DAUGHTER (ON [facility]) STATES THAT SHE WAS UNDERSTANDING THAT WOUND CARE WOULD HELP PATIENT WITH PAIN MEDICATION. HOWEVER THEY TOLD HER THIS WOULD HAVE TO GO THROUGH DR. MARTIN. THERE IS NO REFERRAL FOR PAIN MANAGEMENT. DAUGHTER ALSO WOULD LIKE ADDED THAT PATIENT IS SEEN BY [facility] 3X A WEEK AND SEEING WOUND CARE 1X A WEEK. PATIENT STATES A CALCIUM PAD IS BEING USED AND THIS CAUSES PATIENT BURNING/STABBING PAIN IN LEG.     How long has this issue been going on? THIS WEEK    Is your provider already aware of this issue? SOME OF THIS     Have you been treated for this issue? N/A    REQUESTED THIS BE MARKED HIGH PRIORITY.

## 2023-02-27 ENCOUNTER — TELEPHONE (OUTPATIENT)
Dept: FAMILY MEDICINE CLINIC | Facility: CLINIC | Age: 78
End: 2023-02-27
Payer: MEDICARE

## 2023-02-27 NOTE — TELEPHONE ENCOUNTER
Marbella with Home Health called about patient's wound care and asked about the requested pain medication of Gabapentin due to severe pain in his legs. Had concerns about interactions. Good callback number is

## 2023-02-28 ENCOUNTER — OFFICE VISIT (OUTPATIENT)
Dept: WOUND CARE | Facility: HOSPITAL | Age: 78
End: 2023-02-28
Payer: MEDICARE

## 2023-02-28 PROCEDURE — G0463 HOSPITAL OUTPT CLINIC VISIT: HCPCS

## 2023-02-28 NOTE — TELEPHONE ENCOUNTER
Caller: KATRINA    Relationship: Home Health    Best call back number: [phone]    What orders are you requesting (i.e. lab or imaging): VERBAL    In what timeframe would the patient need to come in: THIS WEEK    Where will you receive your lab/imaging services: IN HOME    Additional notes: KATRINA WITH [facility] IS REQUESTING AN ORDER FOR A PT/INR TO BE DONE FOR PATIENT THIS WEEK.  SHE STATES IT WOULD BE BETTER FOR THE SPOUSE TO NOT HAVE TO GO IN TO THE HOSPITAL    PLEASE ADVISE.

## 2023-02-28 NOTE — TELEPHONE ENCOUNTER
Gave verbal approval to Marbella for a PT/ INR to be done on pt and to call us with the results.  She voiced understanding.

## 2023-03-06 NOTE — TELEPHONE ENCOUNTER
Caller: DONAL GUTIERREZ    Relationship: Emergency Contact    Best call back number: [phone]    What is the best time to reach you: ANY    Who are you requesting to speak with (clinical staff, provider,  specific staff member): CLINICAL    Do you know the name of the person who called: DONAL (DAUGHTER)    What was the call regarding: INR 1.7 ON Friday.  DONAL TOLD MOM TO GIVE PATIENT 7.5 MG OF warfarin (COUMADIN).  THIS WAS GIVEN TO PATIENT ON Sunday NIGHT.    DONAL STATED THAT TAKING THE 5 MG IS NOT KEEPING PATIENT AT THE RANGE DR MARTIN SUGGESTED.  DONAL ASKING HOW OFTEN THE 7.5 MG SOULD AND THE 5 MG BE GIVEN AS WELL.  DONAL REQUESTING ADVISE OF HOW DR MARTIN THINKS PATIENT SHOULD BE TAKING HIS WARFARIN (SCHEDULE).    DONAL STATED INR WILL PROBABLY NEED TO BE CHECKED THIS WEEK.  [facility] WILL BE AT PATIENTS HOUSE AT 1:30 TODAY AND DONAL ASKED IF THEY HAVE THE OKAY TO CHECK IT AGAIN TODAY OR DO THEY NEED NEW ORDER.  IS IT POSSIBLE TO GET THE ORDER TODAY AND QUICKLY IF SO.    Do you require a callback: YES

## 2023-03-10 ENCOUNTER — TELEPHONE (OUTPATIENT)
Dept: FAMILY MEDICINE CLINIC | Facility: CLINIC | Age: 78
End: 2023-03-10
Payer: MEDICARE

## 2023-03-10 NOTE — TELEPHONE ENCOUNTER
Nery ([phone]) with [facility] called and stated the following PT/INR results    PT 32.0  INR 2.7    Pt is taking 5mg of Coumadin daily.

## 2023-03-10 NOTE — TELEPHONE ENCOUNTER
Called Nery with [facility] and informed her that pt is to continue the 5mg of Coumadin daily per Dr. Ureña.   Spoke with pt Daughter and informed of the same.     Nery with [facility] needs to know when the next PT/INR is due.

## 2023-03-13 RX ORDER — DOXYCYCLINE HYCLATE 100 MG/1
100 CAPSULE ORAL 2 TIMES DAILY
Qty: 20 CAPSULE | Refills: 0 | Status: SHIPPED | OUTPATIENT
Start: 2023-03-13

## 2023-03-13 NOTE — TELEPHONE ENCOUNTER
Caller: JOAQUIN    Relationship: Home [facility]    Best call back number: [phone]    What is the best time to reach you: ANY    What was the call regarding: PATIENT'S WIFE TOLD JOAQUIN THAT THE PATIENT HAS BEEN HAVING BAD PANIC ATTACKS. THE PATIENT EXPERIENCED INCREASED ANXIETY DURING TODAYS VISIT. PATIENT IS COUGHING UP GREEN MUCUS. PATIENT'S LUNGS SOUND MORE CONGESTED THAN THEY DID LAST Friday. OXYGEN 97%    Do you require a callback: IF ANY INSTRUCTIONS NEED TO BE GIVEN, YES.

## 2023-03-14 ENCOUNTER — OFFICE VISIT (OUTPATIENT)
Dept: WOUND CARE | Facility: HOSPITAL | Age: 78
End: 2023-03-14
Payer: MEDICARE

## 2023-03-14 PROCEDURE — G0463 HOSPITAL OUTPT CLINIC VISIT: HCPCS

## 2023-03-16 NOTE — TELEPHONE ENCOUNTER
Caller: DONAL GUTIERREZ    Relationship: Emergency Contact    Best call back number: [phone]     What is the best time to reach you: ANYTIME    Who are you requesting to speak with (clinical staff, provider,  specific staff member): CLINICAL     What was the call regarding: PATIENT CALLING WANTING TO DISCUSS THE PATIENTS HEALTH CONCERN SHE STATED THAT THE NURSE CAME TO THE PATIENTS HOME AND STATED THAT SHE COULD HEAR HIM WHEEZING. SHE STATES THAT HE USES A WOOD STOVE TO HEAT UP HIS GARAGE SHE BELIEVES THAT HE COULD HAVE C02 POISONING SHE WOULD LIKE TO GET  IN PUT ON WHAT ELSE SHE BE DONE OTHER THAN JUST BEING PRESCRIBED MEDICATION.    Do you require a callback: YES

## 2023-03-16 NOTE — TELEPHONE ENCOUNTER
Called and spoke to Dalila. Monisha and anthony were exposed to the carbon monoxide. They have been removed from their location and I advised to make sure they get fresh air. I also did mention going to the emergency room.

## 2023-03-20 ENCOUNTER — ANTICOAGULATION VISIT (OUTPATIENT)
Dept: FAMILY MEDICINE CLINIC | Facility: CLINIC | Age: 78
End: 2023-03-20
Payer: MEDICARE

## 2023-03-20 DIAGNOSIS — I63.411 CEREBROVASCULAR ACCIDENT (CVA) DUE TO EMBOLISM OF RIGHT MIDDLE CEREBRAL ARTERY: Primary | ICD-10-CM

## 2023-03-20 LAB — INR PPP: 3.1 (ref 2–3)

## 2023-03-20 NOTE — TELEPHONE ENCOUNTER
Caller: SHONDA    Relationship:     Best call back number: [phone]    Who are you requesting to speak with (clinical staff, provider,  specific staff member):CLINICAL     What was the call regarding: INR- 3.1    Do you require a callback: IF NEEDED

## 2023-03-27 ENCOUNTER — ANTICOAGULATION VISIT (OUTPATIENT)
Dept: FAMILY MEDICINE CLINIC | Facility: CLINIC | Age: 78
End: 2023-03-27
Payer: MEDICARE

## 2023-03-27 ENCOUNTER — TELEPHONE (OUTPATIENT)
Dept: FAMILY MEDICINE CLINIC | Facility: CLINIC | Age: 78
End: 2023-03-27
Payer: MEDICARE

## 2023-03-27 DIAGNOSIS — I63.411 CEREBROVASCULAR ACCIDENT (CVA) DUE TO EMBOLISM OF RIGHT MIDDLE CEREBRAL ARTERY: Primary | ICD-10-CM

## 2023-03-27 LAB — INR PPP: 4 (ref 2–3)

## 2023-03-28 ENCOUNTER — OFFICE VISIT (OUTPATIENT)
Dept: WOUND CARE | Facility: HOSPITAL | Age: 78
End: 2023-03-28
Payer: MEDICARE

## 2023-03-28 PROCEDURE — G0463 HOSPITAL OUTPT CLINIC VISIT: HCPCS

## 2023-03-29 ENCOUNTER — TELEPHONE (OUTPATIENT)
Dept: FAMILY MEDICINE CLINIC | Facility: CLINIC | Age: 78
End: 2023-03-29

## 2023-03-29 LAB — INR PPP: 2.4 (ref 2–3)

## 2023-03-29 RX ORDER — ALBUTEROL SULFATE 90 UG/1
2 AEROSOL, METERED RESPIRATORY (INHALATION) EVERY 4 HOURS PRN
Qty: 8 G | Refills: 1 | Status: SHIPPED | OUTPATIENT
Start: 2023-03-29

## 2023-03-29 NOTE — TELEPHONE ENCOUNTER
Patient had an appointment at the wound care center yesterday and was told he needed an inhaler or nebulizer treatment.  They scheduled an appointment for 3 today, but had to cancel, they can't make it.  They want to know if Dr Cleveland could call in an inhaler.  Phone number for the patient is [phone].  His pharmacy is the [facility] on file.

## 2023-03-30 ENCOUNTER — ANTICOAGULATION VISIT (OUTPATIENT)
Dept: FAMILY MEDICINE CLINIC | Facility: CLINIC | Age: 78
End: 2023-03-30
Payer: MEDICARE

## 2023-03-30 DIAGNOSIS — I63.411 CEREBROVASCULAR ACCIDENT (CVA) DUE TO EMBOLISM OF RIGHT MIDDLE CEREBRAL ARTERY: Primary | ICD-10-CM

## 2023-03-30 PROCEDURE — 36416 COLLJ CAPILLARY BLOOD SPEC: CPT | Performed by: INTERNAL MEDICINE

## 2023-03-30 PROCEDURE — 85610 PROTHROMBIN TIME: CPT | Performed by: INTERNAL MEDICINE

## 2023-04-10 ENCOUNTER — ANTICOAGULATION VISIT (OUTPATIENT)
Dept: FAMILY MEDICINE CLINIC | Facility: CLINIC | Age: 78
End: 2023-04-10
Payer: MEDICARE

## 2023-04-10 ENCOUNTER — TELEPHONE (OUTPATIENT)
Dept: FAMILY MEDICINE CLINIC | Facility: CLINIC | Age: 78
End: 2023-04-10
Payer: MEDICARE

## 2023-04-10 DIAGNOSIS — I63.411 CEREBROVASCULAR ACCIDENT (CVA) DUE TO EMBOLISM OF RIGHT MIDDLE CEREBRAL ARTERY: Primary | ICD-10-CM

## 2023-04-10 LAB — INR PPP: 5.4 (ref 2–3)

## 2023-04-10 NOTE — TELEPHONE ENCOUNTER
Called and spoke to Dalila. Gave her the instructions to hold warfarin for 2 days and then recheck his INR.

## 2023-04-10 NOTE — TELEPHONE ENCOUNTER
Patients legs are swollen past the knees and the daughter would like to know what Dr. Cleveland would like to do.

## 2023-04-11 NOTE — TELEPHONE ENCOUNTER
When the patient's wife called [facility] to report the patient's INR, they told her to call the office back. She is just calling the office as instructed. She had already called our office to report his INR.

## 2023-04-13 ENCOUNTER — TELEPHONE (OUTPATIENT)
Dept: FAMILY MEDICINE CLINIC | Facility: CLINIC | Age: 78
End: 2023-04-13
Payer: MEDICARE

## 2023-04-13 NOTE — TELEPHONE ENCOUNTER
Caller: DONAL GUTIERREZ    Relationship to patient: Emergency Contact    Best call back number: [phone]**    Patient is needing PATIENT DAUGHTER CALLED AND STATED SHE WAS RETURNING YOUR CALL. PLEASE CALL HER BACK  SHE IS TRYING TO GET HER FATHER SOME RELIEF FOR BREATHING THE INHALER REALLY ISN'T WORKING. PLEASE CALL ASAP!

## 2023-04-18 NOTE — TELEPHONE ENCOUNTER
Caller: DONAL GUTIERREZ    Relationship: Emergency Contact    Best call back number: [phone]    Do you require a callback: YES-PATIENT MISSED WOUND CARE APPOINTMENT TODAY DUE TO NOT BEING ABLE TO WALK. THE FAMILY CALLED AN AMBULANCE AND HE REFUSED TO GO. PATIENT IS LETHARGIC POSSIBLE DEHYDRATED.     LOW BLOOD PRESSURE 100/50     FAMILY HAS NO WAY TO GET HIM TO THE HOSPITAL AND PATIENT REFUSED THE AMBULANCE.     EMT THINKS HIS MEDICATION (METOPROLOL AND LASIX) ARE AIDING HIS LOW BLOOD PRESSURE AND PREVENTING IT FROM COMING BACK UP.  DAUGHTER WANTS TO KNOW WHAT TO DO. PLEASE CALL AND ADVISE.

## 2023-04-20 NOTE — OUTREACH NOTE
"AMBULATORY CASE MANAGEMENT NOTE    Name and Relationship of Patient/Support Person: DONAL GUTIERREZ - Emergency Contact    Patient Outreach    Discussed pt's current health status . Daughter, Donal, has multiple concerns, she is concerned because pt has been unable to swallow and is experiencing voice changes, which is new. Pt's INR today is 4.4. She is concerned pt may have an infection related to weeping and blistered BLE. Daughter states pt is unable to stand and thus, has not been able to go to wound care appointments. PCP has advised pt to go to ED, but pt is refusing. Pt is currently using \"Boost\" cans for oxygenation. Pt also has a pressure ulcer to tailbone - per daughter, pt has been losing weight and is very bony there.     This CCM encouraged pt to go to ED, advised that his PCP wants him to go immediately. Discussed other options if pt is  refusing treatment, such as hospice or palliative care - per daughter pt is not interested in hospice, but will consider palliative care.     This CCM remained on the phone with daughter as she encouraged her father to go to ED. Advised pt that he continues to have options in his care when he gets to ED or hospital. Pt is agreeable to go to ED. Advised palliative care can also be discussed at hospital. Pt is agreeing to be transported by daughter's private vehicle to [facility]. Daughter to see if family will assist in pt's transfer to car, as pt is currently refusing to go by EMS.     This CCM called report to Yuliana at [facility] ED. Advised pt will arrive via private vehicle.     Called Donal back and advised to go in through ER entrance and ED has been notified - daughter states pt is now agreeing to go by EMS. PCP aware of plan.      Plan:   F/U 4/24  Chart review  CCM program    Education Documentation  No documentation found.        Zulma PARIS  Ambulatory Case Management    4/20/2023, 14:52 EDT  "

## 2023-04-20 NOTE — TELEPHONE ENCOUNTER
Called and spoke to Dalila. Patient is unable to swallow and he unable to get his warfarin orally. Patients condition is getting worse, legs are giving off a smell, patients legs are huge. She believes patient has a had a stroke due to patient unable to swallow and lip is drooping. I told her that patient needs to go to the ER, the hospital. Patient is refusing.    Patients INR is 4.4 and was advised to hold the patients warfarin.

## 2023-04-24 ENCOUNTER — TELEPHONE (OUTPATIENT)
Dept: FAMILY MEDICINE CLINIC | Facility: CLINIC | Age: 78
End: 2023-04-24

## 2023-04-24 NOTE — TELEPHONE ENCOUNTER
"    “Please be informed that patient has passed. Patient has been marked  in the system. The date of death is: 2023\".    Caller: DONAL GUTIERREZ    Relationship: Emergency Contact    Best call back number: [phone]   "

## 2023-08-17 NOTE — TELEPHONE ENCOUNTER
OK FOR HUB TO READ       Tell him to stay on same coumadin dose.  Recheck in 4 weeks.    ----- Message -----  From: Re Cortez MA  Sent: 2/7/2023  10:52 AM EST  To: [facility] Clinical Pool         Nasalis-Muscle-Based Myocutaneous Island Pedicle Flap Text: Using a #15 blade, an incision was made around the donor flap to the level of the nasalis muscle. Wide lateral undermining was then performed in both the subcutaneous plane above the nasalis muscle, and in a submuscular plane just above periosteum. This allowed the formation of a free nasalis muscle axial pedicle (based on the angular artery) which was still attached to the actual cutaneous flap, increasing its mobility and vascular viability. Hemostasis was obtained with pinpoint electrocoagulation. The flap was mobilized into position and the pivotal anchor points positioned and stabilized with buried interrupted sutures. Subcutaneous and dermal tissues were closed in a multilayered fashion with sutures. Tissue redundancies were excised, and the epidermal edges were apposed without significant tension and sutured with sutures.

## (undated) DEVICE — 28 FR RIGHT ANGLE – SOFT PVC CATHETER: Brand: PVC THORACIC CATHETERS

## (undated) DEVICE — DRSNG SURESITE WNDW 2.38X2.75

## (undated) DEVICE — Device

## (undated) DEVICE — PK HEART OPN 40

## (undated) DEVICE — CLAMP INSERT: Brand: STEALTH® CLAMP INSERT

## (undated) DEVICE — ST. SORBAVIEW ULTIMATE IJ SYSTEM A,C: Brand: CENTURION

## (undated) DEVICE — ADAPT ANTEGRADE RETRGR

## (undated) DEVICE — CORONARY ARTERY BYPASS GRAFT MARKERS, STAINLESS STEEL, DISTAL, WITHOUT HOLDER: Brand: ANASTOMARK CORONARY ARTERY BYPASS GRAFT MARKERS, STAINLESS STEEL, DISTAL

## (undated) DEVICE — ST TOURNI COMPL A/ 7IN

## (undated) DEVICE — PK PERFUS CUST W/CARDIOPLEGIA

## (undated) DEVICE — CANN VESL FREE FLO 2MM

## (undated) DEVICE — CATH TDILUT SWANGANZ VIP 7.5F 110CM

## (undated) DEVICE — HEMOCONCENTRATOR PERFUS LPS06

## (undated) DEVICE — CANN RETRGR STYLET RSCP 15F

## (undated) DEVICE — CVR PROB INTRAOP L TP 12X244CM

## (undated) DEVICE — OASIS DRAIN, DUAL, IN-LINE, ATS COMPATIBLE: Brand: OASIS

## (undated) DEVICE — PK ATS CUST W CARDIOTOMY RESEVOIR

## (undated) DEVICE — CARTRDG CG8 PLS

## (undated) DEVICE — GLV SURG BIOGEL LTX PF 8 1/2

## (undated) DEVICE — BNDG ELAS ELITE V/CLOSE 4IN 5YD LF STRL

## (undated) DEVICE — CANN AORT ROOT DLP VNT 14G 7F

## (undated) DEVICE — SOL NACL 0.9PCT 1000ML

## (undated) DEVICE — ISO/ALC 70PCT 4OZ

## (undated) DEVICE — BIOPATCH™ ANTIMICROBIAL DRESSING WITH CHLORHEXIDINE GLUCONATE IS A HYDROPHILLIC POLYURETHANE ABSORPTIVE FOAM WITH CHLORHEXIDINE GLUCONATE (CHG) WHICH INHIBITS BACTERIAL GROWTH UNDER THE DRESSING. THE DRESSING IS INTENDED TO BE USED TO ABSORB EXUDATE, COVER A WOUND CAUSED BY VASCULAR AND NONVASCULAR PERCUTANEOUS MEDICAL DEVICES DURING SURGERY, AS WELL AS REDUCE LOCAL INFECTION AND COLONIZATION OF MICROORGANISMS.: Brand: BIOPATCH

## (undated) DEVICE — CARTRDG ISTAT KAOLIN ACT

## (undated) DEVICE — ORGANIZER SUT SHELIGH 3T 213013

## (undated) DEVICE — TOWEL,OR,DSP,ST,BLUE,STD,4/PK,20PK/CS: Brand: MEDLINE

## (undated) DEVICE — SENSR CERBRL O2 PK/2

## (undated) DEVICE — VASOVIEW VV6 PRO: Brand: VASOVIEW VV6 PRO

## (undated) DEVICE — TB SXN DLP RIGD MACROSUCKER 6F 3IN

## (undated) DEVICE — ROTATING SURGICAL PUNCHES, 1 PER POUCH: Brand: A&E MEDICAL / ROTATING SURGICAL PUNCHES

## (undated) DEVICE — GLV SURG BIOGEL LTX PF 7 1/2

## (undated) DEVICE — CANN ART EOPA 3D NV W/CONN 20F

## (undated) DEVICE — SYS PERFUS SEP PLATLT W TIPS CUST

## (undated) DEVICE — CANN IRR VEN BVL TP

## (undated) DEVICE — BLOWER/MISTER AXIOUS OPCAB W/TBG

## (undated) DEVICE — AIRLIFE™ HCH HYGROSCOPIC CONDENSER HUMIDFIER: Brand: AIRLIFE™

## (undated) DEVICE — BNDG ELAS ELITE V/CLOSE 6IN 5YD LF STRL

## (undated) DEVICE — GOWN,NON-REINFORCED,SIRUS,SET IN SLV,XXL: Brand: MEDLINE

## (undated) DEVICE — TEMP PACING WIRE: Brand: MYO/WIRE

## (undated) DEVICE — SPNG LAP 18X18IN LF STRL PK/5

## (undated) DEVICE — 28 FR STRAIGHT – SOFT PVC CATHETER: Brand: PVC THORACIC CATHETERS

## (undated) DEVICE — DRP SLUSH MACH FOR STND ALONE OM-ORS-321

## (undated) DEVICE — TBG ART PRESS 60 IN

## (undated) DEVICE — DRSNG WND BORDR/ADHS NONADHR/GZ LF 4X14IN STRL

## (undated) DEVICE — MARKR SKIN W/RULR AND LBL

## (undated) DEVICE — KT CATH CV ACC 2L MAC 4 9/16IN

## (undated) DEVICE — SOL IRR NACL 0.9PCT BT 1000ML

## (undated) DEVICE — 8 FOOT DISPOSABLE EXTENSION CABLE WITH SAFE CONNECT / ALLIGATOR CLIP

## (undated) DEVICE — BLOWER MISTER CLEARVIEW W/TBG

## (undated) DEVICE — SOL IRR RNG BTL 1000ML

## (undated) DEVICE — INSUFFLATION TUBING,LAPAROSCOPIC: Brand: DEROYAL

## (undated) DEVICE — SPNG GZ WOVN 4X4IN 12PLY 10/BX STRL

## (undated) DEVICE — SPNG DISECTOR KTNER XRAY COTN 1/4X9/16IN PK/5